# Patient Record
Sex: FEMALE | Race: WHITE | Employment: OTHER | URBAN - METROPOLITAN AREA
[De-identification: names, ages, dates, MRNs, and addresses within clinical notes are randomized per-mention and may not be internally consistent; named-entity substitution may affect disease eponyms.]

---

## 2017-04-04 ENCOUNTER — TELEPHONE (OUTPATIENT)
Dept: INTERNAL MEDICINE CLINIC | Age: 72
End: 2017-04-04

## 2017-04-04 ENCOUNTER — HOSPITAL ENCOUNTER (OUTPATIENT)
Dept: LAB | Age: 72
Discharge: HOME | End: 2017-04-04
Payer: MEDICARE

## 2017-04-04 ENCOUNTER — HOSPITAL ENCOUNTER (OUTPATIENT)
Dept: GENERAL RADIOLOGY | Age: 72
Discharge: HOME | End: 2017-04-04
Attending: INTERNAL MEDICINE
Payer: MEDICARE

## 2017-04-04 ENCOUNTER — OFFICE VISIT (OUTPATIENT)
Dept: INTERNAL MEDICINE CLINIC | Age: 72
End: 2017-04-04

## 2017-04-04 VITALS
RESPIRATION RATE: 16 BRPM | WEIGHT: 262.6 LBS | HEART RATE: 85 BPM | HEIGHT: 59 IN | SYSTOLIC BLOOD PRESSURE: 125 MMHG | BODY MASS INDEX: 52.94 KG/M2 | TEMPERATURE: 98.4 F | DIASTOLIC BLOOD PRESSURE: 68 MMHG | OXYGEN SATURATION: 97 %

## 2017-04-04 DIAGNOSIS — I10 ESSENTIAL HYPERTENSION: Chronic | ICD-10-CM

## 2017-04-04 DIAGNOSIS — J20.9 ACUTE BRONCHITIS, UNSPECIFIED ORGANISM: ICD-10-CM

## 2017-04-04 DIAGNOSIS — E78.5 HYPERLIPIDEMIA LDL GOAL <130: Chronic | ICD-10-CM

## 2017-04-04 DIAGNOSIS — R06.02 SOB (SHORTNESS OF BREATH): ICD-10-CM

## 2017-04-04 DIAGNOSIS — E55.9 VITAMIN D DEFICIENCY: ICD-10-CM

## 2017-04-04 DIAGNOSIS — J20.9 ACUTE BRONCHITIS, UNSPECIFIED ORGANISM: Primary | ICD-10-CM

## 2017-04-04 PROCEDURE — 83036 HEMOGLOBIN GLYCOSYLATED A1C: CPT

## 2017-04-04 PROCEDURE — 84443 ASSAY THYROID STIM HORMONE: CPT

## 2017-04-04 PROCEDURE — 85027 COMPLETE CBC AUTOMATED: CPT

## 2017-04-04 PROCEDURE — 80053 COMPREHEN METABOLIC PANEL: CPT

## 2017-04-04 PROCEDURE — 36415 COLL VENOUS BLD VENIPUNCTURE: CPT

## 2017-04-04 PROCEDURE — 80061 LIPID PANEL: CPT

## 2017-04-04 PROCEDURE — 71020 XR CHEST PA LAT: CPT

## 2017-04-04 RX ORDER — CODEINE PHOSPHATE AND GUAIFENESIN 10; 100 MG/5ML; MG/5ML
5 SOLUTION ORAL
Qty: 120 ML | Refills: 0 | Status: SHIPPED | OUTPATIENT
Start: 2017-04-04 | End: 2018-04-10 | Stop reason: ALTCHOICE

## 2017-04-04 RX ORDER — PREDNISONE 20 MG/1
TABLET ORAL
Qty: 18 TAB | Refills: 0 | Status: SHIPPED | OUTPATIENT
Start: 2017-04-04 | End: 2018-04-10 | Stop reason: ALTCHOICE

## 2017-04-04 RX ORDER — ALBUTEROL SULFATE 90 UG/1
1 AEROSOL, METERED RESPIRATORY (INHALATION)
Qty: 1 INHALER | Refills: 0 | Status: SHIPPED | OUTPATIENT
Start: 2017-04-04 | End: 2018-04-10 | Stop reason: ALTCHOICE

## 2017-04-04 RX ORDER — AZITHROMYCIN 250 MG/1
250 TABLET, FILM COATED ORAL SEE ADMIN INSTRUCTIONS
Qty: 6 TAB | Refills: 0 | Status: SHIPPED | OUTPATIENT
Start: 2017-04-04 | End: 2017-04-09

## 2017-04-04 RX ORDER — BUDESONIDE AND FORMOTEROL FUMARATE DIHYDRATE 80; 4.5 UG/1; UG/1
2 AEROSOL RESPIRATORY (INHALATION) 2 TIMES DAILY
Qty: 1 INHALER | Refills: 1 | Status: SHIPPED | OUTPATIENT
Start: 2017-04-04 | End: 2018-05-04

## 2017-04-04 NOTE — PROGRESS NOTES
HISTORY OF PRESENT ILLNESS  Jamal Chapman is a 80 y.o. female. HPI     She complains of a productive cough that started 4 days ago. Reports she also has had wheezing, chest tightness, shortness of breath with the cough, and mild aching. She also has chest pain with cough and pain in her back. Pt did not have fever in the office today. Reports she has been sleeping sitting up. Pt has been taking DayQuil with mild relief. She was diagnosed with asthma and has had to use inhaler in the past. She states her shortness of breath with coughing is new with current illness. Hypertension ROS: not taking medications regularly as instructed, no TIA's, no chest pain on exertion, no dyspnea on exertion, no swelling of ankles. New concerns: Stable- bp was 125/68 in the office today. She is not taking medication for her bp. Her weight was 260 in 04/2016 and she gained 20 pounds and is now back down to 262 pounds. She is trying to walk more. Hyperlipidemia:  Cardiovascular risk analysis - 80 y.o. female LDL goal is under 130. ROS: not taking medications as instructed, no TIA's, no chest pain on exertion, no dyspnea on exertion, no swelling of ankles. New concerns: Pt is not taking cholesterol medication. Reports she has shortness of breath with walking longer distances and this is not new and has been in the same pattern. Pt is trying to use her cane less at home and continues to use it when she is out. She is not taking a medication for her mood. Review of Systems   Respiratory: Positive for cough, sputum production, shortness of breath and wheezing. Positive for chest tightness   Cardiovascular: Positive for chest pain. Musculoskeletal: Positive for back pain. All other systems reviewed and are negative. Physical Exam   Constitutional: She is oriented to person, place, and time. She appears well-developed and well-nourished. HENT:   Head: Normocephalic and atraumatic.    Right Ear: External ear normal.   Left Ear: External ear normal.   Nose: Nose normal.   Mouth/Throat: Oropharynx is clear and moist.   Bilateral ear fullness   Eyes: Conjunctivae and EOM are normal. Pupils are equal, round, and reactive to light. Neck: Normal range of motion. Neck supple. Carotid bruit is not present. No thyroid mass and no thyromegaly present. Cardiovascular: Normal rate, regular rhythm, S1 normal, S2 normal, normal heart sounds and intact distal pulses. Pulmonary/Chest: Effort normal. She has wheezes (throughout ). Abdominal: Soft. Normal appearance and bowel sounds are normal. There is no hepatosplenomegaly. There is no tenderness. Musculoskeletal: Normal range of motion. Neurological: She is alert and oriented to person, place, and time. She has normal strength. No cranial nerve deficit or sensory deficit. Coordination normal.   Skin: Skin is warm, dry and intact. No abrasion and no rash noted. Psychiatric: She has a normal mood and affect. Her behavior is normal. Judgment and thought content normal.   Nursing note and vitals reviewed. ASSESSMENT and PLAN  Teodora Silverio was seen today for cough. Diagnoses and all orders for this visit:    Acute bronchitis, unspecified organism  Will follow up with pt with chest x-ray results. Pt to begin taking Zpak, Prednisone taper, and Robitussin AC. Warned pt that steroids will cause weight gain. Pt will also begin using Symbicort and I warned pt to rinse her mouth out after using this to avoid thrush. Pt should use Symbicort until she starts feeling better. She should use Albuterol inhaler prn. She should follow up with me next week on 4/10/17 so I can listen to her lungs. -     XR CHEST PA LAT; Future    Essential hypertension  BP is at goal and pt not taking medication for her bp- will monitor this.    -     CBC W/O DIFF  -     URINALYSIS W/ RFLX MICROSCOPIC    Hyperlipidemia LDL goal <130  Pt not taking cholesterol medication- will check labs today.  -     METABOLIC PANEL, COMPREHENSIVE  -     LIPID PANEL  -     TSH 3RD GENERATION    SOB (shortness of breath)  She has had shortness of breath with walking longer distances although not new and in same pattern and we discussed sending her to a cardiologist in the future. -     XR CHEST PA LAT; Future    Vitamin D deficiency  -     VITAMIN D, 25 HYDROXY    Other orders  -     guaiFENesin-codeine (ROBITUSSIN AC) 100-10 mg/5 mL solution; Take 5 mL by mouth three (3) times daily as needed for Cough. Max Daily Amount: 15 mL. -     budesonide-formoterol (SYMBICORT) 80-4.5 mcg/actuation HFAA inhaler; Take 2 Puffs by inhalation two (2) times a day. -     albuterol (PROAIR HFA) 90 mcg/actuation inhaler; Take 1 Puff by inhalation every four (4) hours as needed for Wheezing or Shortness of Breath. -     predniSONE (DELTASONE) 20 mg tablet; 3 every day for 3 days, 2 every day for 3 days, 1/2 every day for 3 days  -     azithromycin (ZITHROMAX) 250 mg tablet; Take 1 Tab by mouth See Admin Instructions for 5 days. lab results and schedule of future lab studies reviewed with patient  reviewed diet, exercise and weight control  reviewed medications and side effects in detail     Written by Mini Anders, as dictated by Neha Lundy MD.     Current diagnosis and concerns discussed with pt at length. Understands risks and benefits or current treatment plan and medications and accepts the treatment and medication with any possible risks. Pt asks appropriate questions which were answered. Pt instructed to call with any concerns or problems.

## 2017-04-04 NOTE — TELEPHONE ENCOUNTER
[facility] has a questions about the predinisone.   Please call [facility] [phone]

## 2017-04-04 NOTE — PROGRESS NOTES
Please call cxr normal- cont medication management like discussed in visit and see her back soon for follow

## 2017-04-05 LAB
ALBUMIN SERPL-MCNC: 4 G/DL (ref 3.5–4.8)
ALBUMIN/GLOB SERPL: 1.4 {RATIO} (ref 1.2–2.2)
ALP SERPL-CCNC: 80 IU/L (ref 39–117)
ALT SERPL-CCNC: 32 IU/L (ref 0–32)
AST SERPL-CCNC: 25 IU/L (ref 0–40)
BILIRUB SERPL-MCNC: 0.5 MG/DL (ref 0–1.2)
BUN SERPL-MCNC: 17 MG/DL (ref 8–27)
BUN/CREAT SERPL: 28 (ref 12–28)
CALCIUM SERPL-MCNC: 9 MG/DL (ref 8.7–10.3)
CHLORIDE SERPL-SCNC: 100 MMOL/L (ref 96–106)
CHOLEST SERPL-MCNC: 237 MG/DL (ref 100–199)
CO2 SERPL-SCNC: 29 MMOL/L (ref 18–29)
CREAT SERPL-MCNC: 0.6 MG/DL (ref 0.57–1)
ERYTHROCYTE [DISTWIDTH] IN BLOOD BY AUTOMATED COUNT: 14.3 % (ref 12.3–15.4)
GLOBULIN SER CALC-MCNC: 2.8 G/DL (ref 1.5–4.5)
GLUCOSE SERPL-MCNC: 110 MG/DL (ref 65–99)
HCT VFR BLD AUTO: 43.1 % (ref 34–46.6)
HDLC SERPL-MCNC: 49 MG/DL
HGB BLD-MCNC: 13.9 G/DL (ref 11.1–15.9)
INTERPRETATION, 910389: NORMAL
LDLC SERPL CALC-MCNC: 167 MG/DL (ref 0–99)
MCH RBC QN AUTO: 30 PG (ref 26.6–33)
MCHC RBC AUTO-ENTMCNC: 32.3 G/DL (ref 31.5–35.7)
MCV RBC AUTO: 93 FL (ref 79–97)
PLATELET # BLD AUTO: 264 X10E3/UL (ref 150–379)
POTASSIUM SERPL-SCNC: 4.7 MMOL/L (ref 3.5–5.2)
PROT SERPL-MCNC: 6.8 G/DL (ref 6–8.5)
RBC # BLD AUTO: 4.63 X10E6/UL (ref 3.77–5.28)
SODIUM SERPL-SCNC: 144 MMOL/L (ref 134–144)
TRIGL SERPL-MCNC: 105 MG/DL (ref 0–149)
TSH SERPL DL<=0.005 MIU/L-ACNC: 4.29 UIU/ML (ref 0.45–4.5)
VLDLC SERPL CALC-MCNC: 21 MG/DL (ref 5–40)
WBC # BLD AUTO: 5.8 X10E3/UL (ref 3.4–10.8)

## 2017-04-06 ENCOUNTER — TELEPHONE (OUTPATIENT)
Dept: INTERNAL MEDICINE CLINIC | Age: 72
End: 2017-04-06

## 2017-04-06 NOTE — TELEPHONE ENCOUNTER
Contacted pt and advised of cxr results and follow up in office. Pt has appt on Monday for follow up.

## 2017-04-06 NOTE — TELEPHONE ENCOUNTER
----- Message from Suri Wylie MD sent at 4/4/2017  3:49 PM EDT -----  Please call cxr normal- cont medication management like discussed in visit and see her back soon for follow

## 2017-04-08 LAB
HBA1C MFR BLD: 5.8 % (ref 4.8–5.6)
SPECIMEN STATUS REPORT, ROLRST: NORMAL

## 2017-04-10 ENCOUNTER — OFFICE VISIT (OUTPATIENT)
Dept: INTERNAL MEDICINE CLINIC | Age: 72
End: 2017-04-10

## 2017-04-10 VITALS
RESPIRATION RATE: 16 BRPM | DIASTOLIC BLOOD PRESSURE: 64 MMHG | OXYGEN SATURATION: 97 % | SYSTOLIC BLOOD PRESSURE: 144 MMHG | HEART RATE: 81 BPM | HEIGHT: 59 IN | TEMPERATURE: 97.9 F

## 2017-04-10 DIAGNOSIS — Z13.31 SCREENING FOR DEPRESSION: ICD-10-CM

## 2017-04-10 DIAGNOSIS — E78.5 HYPERLIPIDEMIA LDL GOAL <130: Chronic | ICD-10-CM

## 2017-04-10 DIAGNOSIS — N95.9 POST MENOPAUSAL PROBLEMS: ICD-10-CM

## 2017-04-10 DIAGNOSIS — Z12.31 VISIT FOR SCREENING MAMMOGRAM: ICD-10-CM

## 2017-04-10 DIAGNOSIS — Z00.00 MEDICARE ANNUAL WELLNESS VISIT, SUBSEQUENT: ICD-10-CM

## 2017-04-10 DIAGNOSIS — K63.9 BOWEL TROUBLE: ICD-10-CM

## 2017-04-10 DIAGNOSIS — Z00.00 ROUTINE GENERAL MEDICAL EXAMINATION AT A HEALTH CARE FACILITY: ICD-10-CM

## 2017-04-10 DIAGNOSIS — Z23 ENCOUNTER FOR IMMUNIZATION: ICD-10-CM

## 2017-04-10 DIAGNOSIS — Z12.11 SCREENING FOR COLON CANCER: ICD-10-CM

## 2017-04-10 DIAGNOSIS — Z00.00 MEDICARE ANNUAL WELLNESS VISIT, INITIAL: Primary | ICD-10-CM

## 2017-04-10 DIAGNOSIS — Z13.39 SCREENING FOR ALCOHOLISM: ICD-10-CM

## 2017-04-10 DIAGNOSIS — I10 ESSENTIAL HYPERTENSION: Chronic | ICD-10-CM

## 2017-04-10 DIAGNOSIS — Z71.89 ADVANCED CARE PLANNING/COUNSELING DISCUSSION: ICD-10-CM

## 2017-04-10 RX ORDER — VALACYCLOVIR HYDROCHLORIDE 1 G/1
1000 TABLET, FILM COATED ORAL 3 TIMES DAILY
Qty: 21 TAB | Refills: 0 | Status: SHIPPED | OUTPATIENT
Start: 2017-04-10 | End: 2018-04-10 | Stop reason: ALTCHOICE

## 2017-04-10 RX ORDER — OLMESARTAN MEDOXOMIL 20 MG/1
20 TABLET ORAL DAILY
Qty: 30 TAB | Refills: 5 | Status: SHIPPED | OUTPATIENT
Start: 2017-04-10 | End: 2018-04-10 | Stop reason: SDUPTHER

## 2017-04-10 RX ORDER — ATORVASTATIN CALCIUM 10 MG/1
10 TABLET, FILM COATED ORAL DAILY
Qty: 30 TAB | Refills: 5 | Status: SHIPPED | OUTPATIENT
Start: 2017-04-10 | End: 2018-04-10

## 2017-04-10 NOTE — ACP (ADVANCE CARE PLANNING)
NN discussed with patient about an Advanced Medical Directive. Provided patient blank Advanced Medical Directive and 'Your Right to Decide' Booklet. NN reviewed Advanced Medical Directive paperwork with patient with a brief description of how to complete the form. Requested that if document completed, to please provide a copy of completed Advanced Medical Directive to PCP office. Patient verbalized understanding.

## 2017-04-10 NOTE — PROGRESS NOTES
HISTORY OF PRESENT ILLNESS  Ariadne Abrams is a 80 y.o. female. HPI     At her 4/4/17 office visit she complained of a productive cough, wheezing, chest tightness, shortness of breath with the cough, and mild aching. Pt also had chest pain with the cough and pain in her back. I ordered a chest x-ray and prescribed pt Zpak, Prednisone taper, and Robitussin AC. I also started pt on Symbicort. Chest x-ray was normal. Today pt states cough is still present and productive. She finished Zpak and is finishing Prednisone taper. Pt is able to sleep laying down more. She has noticed difficulty sleeping with Prednisone and loose stools with antibiotics. Her mobility and steps have improved and she is feeling better. Hypertension ROS: no TIA's, no chest pain on exertion, no dyspnea on exertion, no swelling of ankles. New concerns: Bp was 144/64 in the office today. Hyperlipidemia:  Cardiovascular risk analysis - 80 y.o. female LDL goal is under 130. ROS: no TIA's, no chest pain on exertion, no dyspnea on exertion, no swelling of ankles. New concerns: Pt is not taking cholesterol medication although cholesterol was elevated in 4/4/17 labs. Pt has never had a bone density scan or pneumonia vaccine. She is overdue for mammogram.     Her A1C was 5.8% in 4/4/17 labs. Reports in the past two months she has been monitoring her carbohydrate intake. Pt does not drink a lot of water. She states that when she has lost weight in the past it is when she eats more and not a large meal.     Her last colonoscopy was 5-10 years ago and reports polyps were found. Reports her BM's fluctuate between loose and normal. Reports since she had intestines resected (due to diverticulitis) she has had bowel urgency. Pt states that the mornings are worse for her in regards to bowel frequency. Reports when she ate a diet with less processed foods and more fruit and vegetables her BM's were better.     Reports she has swelling in bilateral ankles, especially if she is on her feet a lot. Review of Systems   Respiratory: Positive for cough and sputum production. All other systems reviewed and are negative. Physical Exam   Constitutional: She is oriented to person, place, and time. She appears well-developed and well-nourished. HENT:   Head: Normocephalic and atraumatic. Right Ear: External ear normal.   Left Ear: External ear normal.   Nose: Nose normal.   Mouth/Throat: Oropharynx is clear and moist.   Eyes: Conjunctivae and EOM are normal.   Neck: Normal range of motion. Neck supple. Carotid bruit is not present. No thyroid mass and no thyromegaly present. Cardiovascular: Normal rate, regular rhythm, S1 normal, S2 normal, normal heart sounds and intact distal pulses. Pulmonary/Chest: Effort normal. She has wheezes (expiratory). Abdominal: Soft. Normal appearance and bowel sounds are normal. There is no hepatosplenomegaly. There is no tenderness. Musculoskeletal: Normal range of motion. Neurological: She is alert and oriented to person, place, and time. She has normal strength. No cranial nerve deficit or sensory deficit. Coordination normal.   Skin: Skin is warm, dry and intact. No abrasion and no rash noted. Psychiatric: She has a normal mood and affect. Her behavior is normal. Judgment and thought content normal.   Nursing note and vitals reviewed. ASSESSMENT and PLAN  Yoana Anthony was seen today for follow-up. Diagnoses and all orders for this visit:    Medicare annual wellness visit, subsequent  Suggested pt see [facility] downstairs for eye exam.  -     REFERRAL TO OPHTHALMOLOGY    Essential hypertension  Her bp is higher today than it was on 4/4/17 and she has not been taking bp medication. This could be due to all of the medication she is taking. Pt was sick on 4/4/17 and this could be why her bp was so low. I restarted her Benicar 20 mg.      Hyperlipidemia LDL goal <130  Lipids were elevated in recent labs and she has been off of medication. Pt to begin taking Lipitor 10 mg. Post menopausal problems  -     DEXA BONE DENSITY STUDY AXIAL; Future    Screening for colon cancer  -     REFERRAL TO GASTROENTEROLOGY    Visit for screening mammogram  -     MART[facility] MAMMO BI SCREENING INCL CAD; Future    Encounter for immunization  Pt received pneumonia vaccine today in the office.   -     Pneumococcal polysaccharide vaccine, 23-valent, adult or immunosuppressed pt dose    Bowel trouble  Provided pt with Dr. Tomás Holley contact information and encouraged her to make appointment to discuss her loose BM's and history of bowel resection due to diverticulitis prior to making appt for colonoscopy. Other orders  -     olmesartan (BENICAR) 20 mg tablet; Take 1 Tab by mouth daily. -     atorvastatin (LIPITOR) 10 mg tablet; Take 1 Tab by mouth daily. Advised pt that her A1C was in the prediabetic range and I will continue to monitor this. I told pt that she needs to continue to monitor her carbohydrate intake and work on eating small frequent meals. She should follow up with me in 4 months. Symptoms are improving although still has residual cough. Advised pt that cough may last for up to 4-6 weeks. Her oxygenation, heart rate, and bp are stable. Pt should finish Prednisone taper and continue using Symbicort inhaler for a few more weeks. She should make sure her mobility is continuing to improve and her steps are increasing as this is a sign she is doing better. I suggested pt elevate her ankles and also use compression stockings. This should improve with weight loss and decrease in sodium intake. lab results and schedule of future lab studies reviewed with patient  reviewed diet, exercise and weight control  reviewed medications and side effects in detail     Written by Mini Anders, as dictated by Neha Lundy MD.     Current diagnosis and concerns discussed with pt at length.  Understands risks and benefits or current treatment plan and medications and accepts the treatment and medication with any possible risks. Pt asks appropriate questions which were answered. Pt instructed to call with any concerns or problems.

## 2017-04-10 NOTE — PATIENT INSTRUCTIONS
Medicare Part B Preventive Services Guidelines/Limitations Date last completed and Frequency Due Date   Bone Mass Measurement  (age 72 & older, biennial) Requires diagnosis related to osteoporosis or estrogen deficiency. Biennial benefit unless patient has history of long-term glucocorticoid tx or baseline is needed because initial test was by other method Never completed    Recommended every 2 years As recommended by your PCP or Specialist     Cardiovascular Screening Blood Tests (every 5 years)  Total cholesterol, HDL, Triglycerides Order as a panel if possible Completed 4/2017    As recommended by your PCP As recommended by your PCP or Specialist   Colorectal Cancer Screening  -Fecal occult blood test (annual)  -Flexible sigmoidoscopy (5y)  -Screening colonoscopy (10y)  -Barium Enema Age 49-80; After age [de-identified] if history of abnormal results Completed between 5 to 10 years ago     Recommended every 5 to 10 years  Due now as reported by patient     Counseling to Prevent Tobacco Use (up to 8 sessions per year)  - Counseling greater than 3 and up to 10 minutes  - Counseling greater than 10 minutes Patients must be asymptomatic of tobacco-related conditions to receive as preventive service N/A N/A   Diabetes Screening Tests (at least every 3 years, Medicare covers annually or at 6-month intervals for prediabetic patients)    Fasting blood sugar (FBS) or glucose tolerance test (GTT) Patient must be diagnosed with one of the following:  -Hypertension, Dyslipidemia, obesity, previous impaired FBS or GTT  Or any two of the following: overweight, FH of diabetes, age ? 72, history of gestational diabetes, birth of baby weighing more than 9 pounds Completed 4/2017    Recommended every 3 years for non-diabetics    Recommended every 3-6 months for Pre-Diabetics and Diabetics As recommended by your PCP or Specialist     Glaucoma Screening (no USPSTF recommendation) Diabetes mellitus, family history, , age 48 or over,  American, age 72 or over Completed greater than 2 years ago    Recommended annually As recommended by your PCP or Specialist   Seasonal Influenza Vaccination (annually)  Recommended Annually Patient declined this year. TDAP Vaccination  Completed 10/2013    Recommended every 10 years As recommended by your PCP or Specialist   Zoster (Shingles) Vaccination Covered by Medicare Part D through the pharmacy- PCP provides prescription Never received    Recommended once over age 48  As recommended by your PCP or Specialist     Pneumococcal Vaccination (once after 72)  Pneumo 23- 4/2017  Recommended once over the age of 72    Prevnar 15-  Recommended once over the age of 72 Completed        Due April 2018   Screening Mammography (biennial age 54-69) Annually (age 36 or over) Completed several years ago   As recommended by your PCP or Specialist     Screening Pap Tests and Pelvic Examination (up to age 79 and after 79 if unknown history or abnormal study last 8 years) Every 25 months except high risk As recommended by your PCP or Specialist   As recommended by your PCP or Specialist     Ultrasound Screening for Abdominal Aortic Aneurysm (AAA) (once) Patient must be referred through [facility] and not have had a screening for abdominal aortic aneurysm before under Medicare. Limited to patients who meet one of the following criteria:  - Men who are 73-68 years old and have smoked more than 100 cigarettes in their lifetime.  -Anyone with a FH of AAA  -Anyone recommended for screening by USPSTF Not indicated unless recommended by PCP   Not indicated unless recommended by PCP     Family Practice Management 2011    Please bring a copy of your completed advance medical directive to the office so it may be added to your medical record. Thank you. If you have any questions or concerns please feel free to contact me at [phone]. It was a pleasure meeting you today and participating in your healthcare.   Chasidy Langford Celia Keller

## 2017-04-11 PROBLEM — Z71.89 ADVANCED CARE PLANNING/COUNSELING DISCUSSION: Status: ACTIVE | Noted: 2017-04-11

## 2017-04-11 NOTE — PROGRESS NOTES
Nurse Navigator Medicare Wellness Visit performed by LETY Frausto  This is an Initial Fredy Exam (AWV) (Performed 12 months after IPPE or effective date of Medicare Part B enrollment, Once in a lifetime)    I have reviewed the patient's medical history in detail and updated the computerized patient record. History     Past Medical History:   Diagnosis Date    Asthma     COPD (chronic obstructive pulmonary disease) ([address]     Diverticulitis     Hyperlipidemia     Hyperlipidemia LDL goal <130 3/17/2016    Hypertension     Psychiatric disorder     Depression      Past Surgical History:   Procedure Laterality Date    HX COLECTOMY      Partial    HX HEENT      HX MENISCECTOMY      left knee     Current Outpatient Prescriptions   Medication Sig Dispense Refill    olmesartan (BENICAR) 20 mg tablet Take 1 Tab by mouth daily. 30 Tab 5    atorvastatin (LIPITOR) 10 mg tablet Take 1 Tab by mouth daily. 30 Tab 5    valACYclovir (VALTREX) 1 gram tablet Take 1 Tab by mouth three (3) times daily. 21 Tab 0    guaiFENesin-codeine (ROBITUSSIN AC) 100-10 mg/5 mL solution Take 5 mL by mouth three (3) times daily as needed for Cough. Max Daily Amount: 15 mL. 120 mL 0    budesonide-formoterol (SYMBICORT) 80-4.5 mcg/actuation HFAA inhaler Take 2 Puffs by inhalation two (2) times a day. 1 Inhaler 1    albuterol (PROAIR HFA) 90 mcg/actuation inhaler Take 1 Puff by inhalation every four (4) hours as needed for Wheezing or Shortness of Breath. 1 Inhaler 0    predniSONE (DELTASONE) 20 mg tablet 3 every day for 3 days, 2 every day for 3 days, 1/2 every day for 3 days 18 Tab 0    indomethacin (INDOCIN) 25 mg/5 mL oral suspension Take  by mouth.  cephALEXin (KEFLEX) 500 mg capsule Take 500 mg by mouth four (4) times daily.  acetaminophen (TYLENOL) 500 mg tablet Take 2 Tabs by mouth every eight (8) hours.  60 Tab 2    oxyCODONE IR (ROXICODONE) 10 mg tab immediate release tablet 1-2 tabs every 3hrs as needed for pain. 100 Tab 0    docusate sodium (COLACE) 50 mg capsule Take two tabs twice daily for two weeks, then twice a day as needed thereafter. Hold for loose stools. 60 Cap 2    ondansetron (ZOFRAN ODT) 8 mg disintegrating tablet Take 1 Tab by mouth every eight (8) hours as needed for Nausea. 20 Tab 2    atorvastatin (LIPITOR) 20 mg tablet Take 1 Tab by mouth daily for 90 days. 90 Tab 0    furosemide (LASIX) 20 mg tablet Take 1 Tab by mouth daily for 90 days. 90 Tab 0    FLUoxetine (PROZAC) 20 mg capsule Take 1 Cap by mouth daily for 90 days. 90 Cap 0    esomeprazole (NEXIUM) 40 mg capsule Take 1 Cap by mouth daily for 90 days. 90 Cap 1     Allergies   Allergen Reactions    Ciprofloxacin Swelling    Penicillins Swelling     Unsure of reaction    Sulfa (Sulfonamide Antibiotics) Unknown (comments)     She doesn't remember       History reviewed. No pertinent family history. Social History   Substance Use Topics    Smoking status: Never Smoker    Smokeless tobacco: Not on file    Alcohol use Yes      Comment: glass of wine each day     Patient Active Problem List   Diagnosis Code    Fracture T14.8    HTN (hypertension) I10    Hyperlipidemia LDL goal <130 E78.5    Dysthymia F34.1    Advanced care planning/counseling discussion Z71.89         Depression Risk Factor Screening:   Patient denies feelings of being down, depressed or hopeless at this time. Patient states that they have a strong support system within their family & friends. Patient shares that she is  from her , whom she was  to for about 40 years. Patient adds that she was unable to afford her home in [place], so she moved to [place] to be close to her son & his family. Patient denies thoughts of harm to self or others. Patient states that she is hopeful the divorce from her  will be finalized this year.   PHQ 2 / 9, over the last two weeks 4/10/2017   Little interest or pleasure in doing things Not at all   Feeling down, depressed or hopeless Not at all   Total Score PHQ 2 0     Alcohol Risk Factor Screening: On any occasion during the past 3 months, have you had more than 3 drinks containing alcohol? No    Do you average more than 7 drinks per week? No    Functional Ability and Level of Safety:     Hearing Loss   normal-to-mild    Activities of Daily Living   Self-care. Patient states that she is renting an apartment & she lives alone. Patient adds that her son does live in the area & she speaks with him & visits him regularly. Patient states independence in all ADLs & denies the use of assistive devices for ambulation. NN encouraged patient to continue and/ or introduce routine physical exercise into their daily routine as applicable & as recommended by PCP. Patient verbalized understanding & agreement to take this into consideration. Patient shares that her weight has been a constant struggle; she recently purchased a FitBit to monitor her steps & she tries to walk 8,000 steps every day. Patient shares that she has been on prednisone & she thinks that has caused her some recent weight gain. Patient tries to monitor her caloric intake, but carbohydrates are her weakness. NN discussed the importance of drinking water daily. Patient verbalized understanding & admits that she does not drink much water, but she will work on increasing her hydration. Patient states that she had a bad fall about a year ago, so she is very cautious with her actions; however, she does enjoy walking her dog.    Requires assistance with:   ADL Assessment 4/10/2017   Feeding yourself No Help Needed   Getting from bed to chair No Help Needed   Getting dressed No Help Needed   Bathing or showering No Help Needed   Walk across the room (includes cane/walker) No Help Needed   Using the telphone No Help Needed   Taking your medications No Help Needed   Preparing meals No Help Needed   Managing money (expenses/bills) No Help Needed   Moderately strenuous housework (laundry) No Help Needed   Shopping for personal items (toiletries/medicines) No Help Needed   Shopping for groceries No Help Needed   Driving No Help Needed   Climbing a flight of stairs No Help Needed   Getting to places beyond walking distances No Help Needed          Fall Risk   Patient denies falls within the past year & verbalizes awareness of fall prevention strategies. Fall Risk Assessment, last 12 mths 4/10/2017   Able to walk? Yes   Fall in past 12 months? No   Fall with injury? -   Number of falls in past 12 months -   Fall Risk Score -     Abuse Screen   Patient is not abused    Review of Systems   Medicare Wellness Visit    Physical Examination     No exam data present    Evaluation of Cognitive Function:  Mood/affect:  happy  Appearance: age appropriate and casually dressed  Family member/caregiver input: None present; however, patient reports a strong support system. No exam performed today, Medicare Wellness Visit. Patient Care Team:  Luisa Young MD as PCP - General (Internal Medicine)    Advice/Referrals/Counseling   Education and counseling provided:  End-of-Life planning (with patient's consent)  Pneumococcal Vaccine  Influenza Vaccine  Screening Mammography  Screening Pap and pelvic (covered once every 2 years)  Colorectal cancer screening tests  Bone mass measurement (DEXA)  Screening for glaucoma  tdap & shingles vaccinations      Assessment/Plan   1. NN discussed with patient about an Advanced Medical Directive. Provided patient blank Advanced Medical Directive and 'Your Right to Decide' Booklet. NN reviewed Advanced Medical Directive paperwork with patient with a brief description of how to complete the form. Requested that if document completed, to please provide a copy of completed Advanced Medical Directive to PCP office. Patient verbalized understanding.      2. Patient is up to date on the following immunizations: tdap vaccine (admin 10/2013). Patient confirmed the aforementioned preventative immunization dates are correct. Patient denies receiving a 2016 flu vaccine. Patient denies receiving a shingles vaccine in the past & patient denies ever having a case of shingles in the past. Patient denies receiving pneumonia vaccines. Patient verbalized agreement for a pneumonia 23 vaccine today. PCP notified & order written. Nurse administered pneumonia 23 vaccine to patient. VIS to be provided by treatment team. Patient's health maintenance immunization record has been updated & is current. 3. Patient admits that she has never had a screening dexa scan; she states that it has been 5-10 years since her last screening colonoscopy completed in [place] & colon polyps were removed. Patient states that it has been several years since her last screening mammogram & a routine eye exam/ glaucoma screening. PCP notified. 4. Today, PCP provided patient with the following preventative service items: an order for a screening dexa scan, an order for a screening mammogram, a referral to Dr. Roger Sims for evaluation of a screening colonoscopy & a referral to [facility] John Costello at the [facility] for a routine eye exam & glaucoma screening. Patient verbalized agreement to complete preventative measures. Patient verbalized understanding of all information discussed. Patient was given the opportunity to ask questions. Medication reconciliation completed by MA/ LPN and reviewed by PCP. Patient provided AVS which includes Medicare Wellness Preventative Screening Table.

## 2018-04-10 ENCOUNTER — OFFICE VISIT (OUTPATIENT)
Dept: INTERNAL MEDICINE CLINIC | Age: 73
End: 2018-04-10

## 2018-04-10 VITALS
DIASTOLIC BLOOD PRESSURE: 77 MMHG | HEIGHT: 59 IN | HEART RATE: 79 BPM | RESPIRATION RATE: 14 BRPM | OXYGEN SATURATION: 97 % | TEMPERATURE: 97.4 F | WEIGHT: 291.2 LBS | BODY MASS INDEX: 58.71 KG/M2 | SYSTOLIC BLOOD PRESSURE: 156 MMHG

## 2018-04-10 DIAGNOSIS — Z00.00 MEDICARE ANNUAL WELLNESS VISIT, SUBSEQUENT: Primary | ICD-10-CM

## 2018-04-10 DIAGNOSIS — Z78.0 POST-MENOPAUSAL: ICD-10-CM

## 2018-04-10 DIAGNOSIS — Z12.31 VISIT FOR SCREENING MAMMOGRAM: ICD-10-CM

## 2018-04-10 DIAGNOSIS — R73.01 IMPAIRED FASTING GLUCOSE: ICD-10-CM

## 2018-04-10 DIAGNOSIS — E78.5 HYPERLIPIDEMIA LDL GOAL <130: Chronic | ICD-10-CM

## 2018-04-10 DIAGNOSIS — N93.9 VAGINAL SPOTTING: ICD-10-CM

## 2018-04-10 DIAGNOSIS — I10 ESSENTIAL HYPERTENSION: Chronic | ICD-10-CM

## 2018-04-10 DIAGNOSIS — E66.01 OBESITY, MORBID (HCC): ICD-10-CM

## 2018-04-10 DIAGNOSIS — F34.1 DYSTHYMIA: ICD-10-CM

## 2018-04-10 DIAGNOSIS — Z12.11 SCREENING FOR COLON CANCER: ICD-10-CM

## 2018-04-10 RX ORDER — ATORVASTATIN CALCIUM 20 MG/1
20 TABLET, FILM COATED ORAL DAILY
Qty: 90 TAB | Refills: 0 | Status: SHIPPED | OUTPATIENT
Start: 2018-04-10 | End: 2018-07-07 | Stop reason: SDUPTHER

## 2018-04-10 RX ORDER — FUROSEMIDE 20 MG/1
20 TABLET ORAL DAILY
Qty: 90 TAB | Refills: 0 | Status: SHIPPED | OUTPATIENT
Start: 2018-04-10 | End: 2018-07-07 | Stop reason: SDUPTHER

## 2018-04-10 RX ORDER — FLUOXETINE HYDROCHLORIDE 20 MG/1
20 CAPSULE ORAL DAILY
Qty: 90 CAP | Refills: 0 | Status: SHIPPED | OUTPATIENT
Start: 2018-04-10 | End: 2018-07-07 | Stop reason: SDUPTHER

## 2018-04-10 RX ORDER — OLMESARTAN MEDOXOMIL 20 MG/1
20 TABLET ORAL DAILY
Qty: 90 TAB | Refills: 0 | Status: SHIPPED | OUTPATIENT
Start: 2018-04-10 | End: 2018-05-18 | Stop reason: SDUPTHER

## 2018-04-10 NOTE — PROGRESS NOTES
HISTORY OF PRESENT ILLNESS  Ron Duke is a 71 y.o. female. HPI   Dysthymia: Patient reports she is under an immense amount of stress. She has not taken any medications for the past 9 months. She state she is ready to resume medications. She inquires about starting medications for anxiety. She was on Prozac in the past. Patient has been trying to exercise with walking. She notes as the weather gets better she will exercise more. GERD: Patient occasionally takes TUMS to manage reflux. She has not been taking Nexium. SOB: Patient has Symbicort, but has not been using it. She has noticed decline in her breathing. Patient has noticed she develops SOB easily. Hypertension ROS: not taking medications, no TIA's, no chest pain on exertion, no dyspnea on exertion, no swelling of ankles. New concerns:  Patient's BP in office today is 156/77. She has not been taking Benicar. Patient has not been taking lasix. She notes fluid retention has gotten horrible. Hyperlipidemia:  Cardiovascular risk analysis - 71 y.o. female LDL goal is under 130. ROS: not taking medications regularly as instructed, no TIA's, no chest pain on exertion, no dyspnea on exertion, no swelling of ankles. Tolerating meds, no myalgias or other side effects noted  New concerns: Patient has not been taking atorvastatin. Patient reports over the past few weeks she has noticed vaginal spotting. Patient states for years she has had intermittent diarrhea in the mornings. She denies following up for colonoscopy. Patient received flu vaccine this season. Denies following up for pneumonia vaccines or mammogram.   Review of Systems   All other systems reviewed and are negative. Physical Exam   Constitutional: She is oriented to person, place, and time. She appears well-developed and well-nourished. HENT:   Head: Normocephalic and atraumatic.    Right Ear: External ear normal.   Left Ear: External ear normal.   Nose: Nose normal. Mouth/Throat: Oropharynx is clear and moist.   Eyes: Conjunctivae and EOM are normal.   Neck: Normal range of motion. Neck supple. Carotid bruit is not present. No thyroid mass and no thyromegaly present. Cardiovascular: Normal rate, regular rhythm, S1 normal, S2 normal, normal heart sounds and intact distal pulses. Pulmonary/Chest: Effort normal and breath sounds normal.   Abdominal: Soft. Normal appearance and bowel sounds are normal. There is no hepatosplenomegaly. There is no tenderness. Musculoskeletal: Normal range of motion. Neurological: She is alert and oriented to person, place, and time. She has normal strength. No cranial nerve deficit or sensory deficit. Coordination normal.   Skin: Skin is warm, dry and intact. No abrasion and no rash noted. Psychiatric: She has a normal mood and affect. Her behavior is normal. Judgment and thought content normal.   Nursing note and vitals reviewed. ASSESSMENT and PLAN  Diagnoses and all orders for this visit:    1. Medicare annual wellness visit, subsequent  -     CBC W/O DIFF    2. Essential hypertension  /77 today in office. Patient has not been on medications for past 9 months. Instructed patient to resume Benicar and Lasix. -     olmesartan (BENICAR) 20 mg tablet; Take 1 Tab by mouth daily. -     furosemide (LASIX) 20 mg tablet; Take 1 Tab by mouth daily for 90 days.  -     METABOLIC PANEL, COMPREHENSIVE  -     URINALYSIS W/ RFLX MICROSCOPIC    3. Hyperlipidemia LDL goal <130  Suspect labs will show poor lipid values. Patient has not been on medications for past 9 months. Instructed patient to resume atorvastatin. -     atorvastatin (LIPITOR) 20 mg tablet; Take 1 Tab by mouth daily for 90 days.  -     LIPID PANEL    4. Dysthymia  Patient under a lot of stress and her anxiety is exacerbated. She will resume taking Prozac 20 mg.   -     FLUoxetine (PROZAC) 20 mg capsule; Take 1 Cap by mouth daily for 90 days.     5. Obesity, morbid [facility])  Encouraged patient to resume exercising and watching diet more closely to facilitate weight loss. 6. Vaginal spotting  Patient will follow up with GYN to discuss. Gaviota Stephenson OB/GYN ref [facility]    7. Impaired fasting glucose  Will monitor A1C. Concerned lack of medications and lack of healthy diet will have caused spike in A1C.   -     HEMOGLOBIN A1C WITH EAG    8. Screening for colon cancer  Patient will follow up with GI for colonoscopy. -     REFERRAL TO GASTROENTEROLOGY    9. Visit for screening mammogram  Patient due for imaging and will follow up. -     MARTÍN MAMMO BI SCREENING INCL CAD; Future    10. Post-menopausal  Patient due for imaging and will follow up. -     DEXA BONE DENSITY STUDY AXIAL; Future    She will follow up in a month.     lab results and schedule of future lab studies reviewed with patient  reviewed diet, exercise and weight control    Written by Ray Pina, as dictated by Sintia Levy MD.     Current diagnosis and concerns discussed with pt at length. Understands risks and benefits or current treatment plan and medications and accepts the treatment and medication with any possible risks. Pt asks appropriate questions which were answered. Pt instructed to call with any concerns or problems.

## 2018-04-10 NOTE — ACP (ADVANCE CARE PLANNING)
Directive and 'Your Right to Decide' Booklet. I reviewed Advanced Medical Directive paperwork with patient with a brief description of how to complete the form. Requested that if document completed, to please provide a copy of completed Advanced Medical Directive to PCP office. Patient verbalized understanding.

## 2018-04-10 NOTE — PATIENT INSTRUCTIONS

## 2018-04-10 NOTE — PROGRESS NOTES
This is the Subsequent Medicare Annual Wellness Exam, performed 12 months or more after the Initial AWV or the last Subsequent AWV    I have reviewed the patient's medical history in detail and updated the computerized patient record. History     Past Medical History:   Diagnosis Date    Asthma     COPD (chronic obstructive pulmonary disease) [address] [address]     Diverticulitis     Hyperlipidemia     Hyperlipidemia LDL goal <130 3/17/2016    Hypertension     Psychiatric disorder     Depression      Past Surgical History:   Procedure Laterality Date    HX COLECTOMY      Partial    HX HEENT      HX MENISCECTOMY      left knee     Current Outpatient Prescriptions   Medication Sig Dispense Refill    olmesartan (BENICAR) 20 mg tablet Take 1 Tab by mouth daily. 30 Tab 5    budesonide-formoterol (SYMBICORT) 80-4.5 mcg/actuation HFAA inhaler Take 2 Puffs by inhalation two (2) times a day. 1 Inhaler 1    atorvastatin (LIPITOR) 20 mg tablet Take 1 Tab by mouth daily for 90 days. 90 Tab 0    furosemide (LASIX) 20 mg tablet Take 1 Tab by mouth daily for 90 days. 90 Tab 0    FLUoxetine (PROZAC) 20 mg capsule Take 1 Cap by mouth daily for 90 days. 90 Cap 0    esomeprazole (NEXIUM) 40 mg capsule Take 1 Cap by mouth daily for 90 days. 90 Cap 1    indomethacin (INDOCIN) 25 mg/5 mL oral suspension Take  by mouth.  oxyCODONE IR (ROXICODONE) 10 mg tab immediate release tablet 1-2 tabs every 3hrs as needed for pain. 100 Tab 0    docusate sodium (COLACE) 50 mg capsule Take two tabs twice daily for two weeks, then twice a day as needed thereafter. Hold for loose stools. 60 Cap 2    ondansetron (ZOFRAN ODT) 8 mg disintegrating tablet Take 1 Tab by mouth every eight (8) hours as needed for Nausea.  20 Tab 2     Allergies   Allergen Reactions    Ciprofloxacin Swelling    Penicillins Swelling     Unsure of reaction    Sulfa (Sulfonamide Antibiotics) Unknown (comments)     She doesn't remember       No family history on file.  Social History   Substance Use Topics    Smoking status: Never Smoker    Smokeless tobacco: Never Used    Alcohol use Yes      Comment: glass of wine each day     Patient Active Problem List   Diagnosis Code    Fracture T14. 8XXA    HTN (hypertension) I10    Hyperlipidemia LDL goal <130 E78.5    Dysthymia F34.1    Advanced care planning/counseling discussion Z71.89    Obesity, morbid ([facility] [address] E66.01       Depression Risk Factor Screening:     PHQ over the last two weeks 4/10/2017   Little interest or pleasure in doing things Not at all   Feeling down, depressed or hopeless Not at all   Total Score PHQ 2 0     Alcohol Risk Factor Screening: You do not drink alcohol or very rarely. Functional Ability and Level of Safety:   Hearing Loss  Hearing is good. Activities of Daily Living  The home contains: handrails and grab bars  Patient does total self care    Fall Risk  Fall Risk Assessment, last 12 mths 4/10/2018   Able to walk? Yes   Fall in past 12 months? No   Fall with injury? -   Number of falls in past 12 months -   Fall Risk Score -       Abuse Screen  Patient is not abused    Cognitive Screening   Evaluation of Cognitive Function:  Has your family/caregiver stated any concerns about your memory: no  Normal    Patient Care Team   Patient Care Team:  Gonzalo Bedolla MD as PCP - General (Internal Medicine)    Assessment/Plan   Education and counseling provided:  Are appropriate based on today's review and evaluation  End-of-Life planning (with patient's consent)    Diagnoses and all orders for this visit:    1. Medicare annual wellness visit, subsequent    2. Essential hypertension    3. Hyperlipidemia LDL goal <130    4. Dysthymia    5.  Obesity, morbid [facility]        Health Maintenance Due   Topic Date Due    COLONOSCOPY  09/29/1963    BREAST CANCER SCRN MAMMOGRAM  09/29/1996    ZOSTER VACCINE AGE 60>  07/29/2006    GLAUCOMA SCREENING Q2Y  09/29/2011    Bone Densitometry (Dexa) Screening 09/29/2011    Influenza Age 9 to Adult  08/01/2017    Pneumococcal 65+ Low/Medium Risk (2 of 2 - PCV13) 04/10/2018

## 2018-04-12 ENCOUNTER — HOSPITAL ENCOUNTER (OUTPATIENT)
Dept: LAB | Age: 73
Discharge: HOME | End: 2018-04-12
Payer: MEDICARE

## 2018-04-12 PROCEDURE — 81001 URINALYSIS AUTO W/SCOPE: CPT

## 2018-04-12 PROCEDURE — 83036 HEMOGLOBIN GLYCOSYLATED A1C: CPT

## 2018-04-12 PROCEDURE — 85027 COMPLETE CBC AUTOMATED: CPT

## 2018-04-12 PROCEDURE — 80053 COMPREHEN METABOLIC PANEL: CPT

## 2018-04-12 PROCEDURE — 80061 LIPID PANEL: CPT

## 2018-04-12 PROCEDURE — 36415 COLL VENOUS BLD VENIPUNCTURE: CPT

## 2018-04-13 LAB
ALBUMIN SERPL-MCNC: 4.1 G/DL (ref 3.5–4.8)
ALBUMIN/GLOB SERPL: 1.4 {RATIO} (ref 1.2–2.2)
ALP SERPL-CCNC: 71 IU/L (ref 39–117)
ALT SERPL-CCNC: 35 IU/L (ref 0–32)
APPEARANCE UR: CLEAR
AST SERPL-CCNC: 25 IU/L (ref 0–40)
BACTERIA #/AREA URNS HPF: ABNORMAL /[HPF]
BILIRUB SERPL-MCNC: 0.4 MG/DL (ref 0–1.2)
BILIRUB UR QL STRIP: NEGATIVE
BUN SERPL-MCNC: 19 MG/DL (ref 8–27)
BUN/CREAT SERPL: 27 (ref 12–28)
CALCIUM SERPL-MCNC: 9.5 MG/DL (ref 8.7–10.3)
CASTS URNS QL MICRO: ABNORMAL /LPF
CHLORIDE SERPL-SCNC: 104 MMOL/L (ref 96–106)
CHOLEST SERPL-MCNC: 223 MG/DL (ref 100–199)
CO2 SERPL-SCNC: 27 MMOL/L (ref 18–29)
COLOR UR: YELLOW
CREAT SERPL-MCNC: 0.71 MG/DL (ref 0.57–1)
EPI CELLS #/AREA URNS HPF: ABNORMAL /HPF
ERYTHROCYTE [DISTWIDTH] IN BLOOD BY AUTOMATED COUNT: 13.8 % (ref 12.3–15.4)
EST. AVERAGE GLUCOSE BLD GHB EST-MCNC: 123 MG/DL
GFR SERPLBLD CREATININE-BSD FMLA CKD-EPI: 86 ML/MIN/1.73
GFR SERPLBLD CREATININE-BSD FMLA CKD-EPI: 99 ML/MIN/1.73
GLOBULIN SER CALC-MCNC: 2.9 G/DL (ref 1.5–4.5)
GLUCOSE SERPL-MCNC: 129 MG/DL (ref 65–99)
GLUCOSE UR QL: NEGATIVE
HBA1C MFR BLD: 5.9 % (ref 4.8–5.6)
HCT VFR BLD AUTO: 42.8 % (ref 34–46.6)
HDLC SERPL-MCNC: 58 MG/DL
HGB BLD-MCNC: 13.8 G/DL (ref 11.1–15.9)
HGB UR QL STRIP: ABNORMAL
INTERPRETATION, 910389: NORMAL
KETONES UR QL STRIP: NEGATIVE
LDLC SERPL CALC-MCNC: 146 MG/DL (ref 0–99)
LEUKOCYTE ESTERASE UR QL STRIP: NEGATIVE
MCH RBC QN AUTO: 29.5 PG (ref 26.6–33)
MCHC RBC AUTO-ENTMCNC: 32.2 G/DL (ref 31.5–35.7)
MCV RBC AUTO: 92 FL (ref 79–97)
MICRO URNS: ABNORMAL
MUCOUS THREADS URNS QL MICRO: PRESENT
NITRITE UR QL STRIP: NEGATIVE
PH UR STRIP: 5.5 [PH] (ref 5–7.5)
PLATELET # BLD AUTO: 272 X10E3/UL (ref 150–379)
POTASSIUM SERPL-SCNC: 5.5 MMOL/L (ref 3.5–5.2)
PROT SERPL-MCNC: 7 G/DL (ref 6–8.5)
PROT UR QL STRIP: ABNORMAL
RBC # BLD AUTO: 4.68 X10E6/UL (ref 3.77–5.28)
RBC #/AREA URNS HPF: ABNORMAL /HPF
SODIUM SERPL-SCNC: 145 MMOL/L (ref 134–144)
SP GR UR: 1.03 (ref 1–1.03)
TRIGL SERPL-MCNC: 96 MG/DL (ref 0–149)
UROBILINOGEN UR STRIP-MCNC: 0.2 MG/DL (ref 0.2–1)
VLDLC SERPL CALC-MCNC: 19 MG/DL (ref 5–40)
WBC # BLD AUTO: 6.5 X10E3/UL (ref 3.4–10.8)
WBC #/AREA URNS HPF: ABNORMAL /HPF

## 2018-04-17 NOTE — PROGRESS NOTES
Can you call her ? I tried but could not get in touch yesterday- her urine showed some red cells in it ? Is that something she had before?  Rest of labs are stable- lipids are getting better and slight prediabetes- we can send a copy home

## 2018-04-19 ENCOUNTER — TELEPHONE (OUTPATIENT)
Dept: INTERNAL MEDICINE CLINIC | Age: 73
End: 2018-04-19

## 2018-04-19 NOTE — TELEPHONE ENCOUNTER
Spoke with the wife of patients emergency contact (son frantz) and asked her if she could contact [place] and have her call our office to discuss recent labs.

## 2018-04-20 ENCOUNTER — TELEPHONE (OUTPATIENT)
Dept: INTERNAL MEDICINE CLINIC | Age: 73
End: 2018-04-20

## 2018-04-20 NOTE — TELEPHONE ENCOUNTER
----- Message from Melvi Connor sent at 4/20/2018 10:59 AM EDT -----  Regarding: Dr. Inna Calderon returning a call from nurse in regards to lab work.      Best contact number: [phone]

## 2018-04-20 NOTE — TELEPHONE ENCOUNTER
Verified 3 Pt ID. Notified Pt of lab results. She denies having a hx of red blood cells in urine. She reports hx of diverticulitis which resulted in removal of part of her intestines several yrs ago. Pt has scheduled appt with gyn on 5/4/18. Lab results have been mailed to Pt.

## 2018-04-23 NOTE — TELEPHONE ENCOUNTER
Contact pt and discuss Dr. De Los Santos Smoker recommendation for evaluation with Urology even though seeing GYN soon. Pt understood and will schedule with [facility].

## 2018-05-04 ENCOUNTER — OFFICE VISIT (OUTPATIENT)
Dept: OBGYN CLINIC | Age: 73
End: 2018-05-04

## 2018-05-04 VITALS
HEIGHT: 59 IN | WEIGHT: 290 LBS | BODY MASS INDEX: 58.46 KG/M2 | SYSTOLIC BLOOD PRESSURE: 130 MMHG | DIASTOLIC BLOOD PRESSURE: 74 MMHG

## 2018-05-04 DIAGNOSIS — N95.0 POST-MENOPAUSAL BLEEDING: Primary | ICD-10-CM

## 2018-05-04 DIAGNOSIS — E66.01 OBESITY, MORBID, BMI 50 OR HIGHER (HCC): ICD-10-CM

## 2018-05-04 DIAGNOSIS — R31.9 HEMATURIA, UNSPECIFIED TYPE: ICD-10-CM

## 2018-05-04 LAB
BILIRUB UR QL STRIP: NEGATIVE
GLUCOSE UR-MCNC: NEGATIVE MG/DL
KETONES P FAST UR STRIP-MCNC: NORMAL MG/DL
PH UR STRIP: NORMAL [PH] (ref 4.6–8)
PROT UR QL STRIP: NEGATIVE
SP GR UR STRIP: NORMAL (ref 1–1.03)
UA UROBILINOGEN AMB POC: NORMAL (ref 0.2–1)
URINALYSIS CLARITY POC: CLEAR
URINALYSIS COLOR POC: YELLOW
URINE BLOOD POC: NORMAL
URINE LEUKOCYTES POC: NEGATIVE
URINE NITRITES POC: NEGATIVE

## 2018-05-04 NOTE — PROGRESS NOTES
[facility]  [email or web address]  [phone]    Magaly Marin MD, FACOG       OB/GYN Problem visit    Chief Complaint:   Chief Complaint   Patient presents with    Post Menopausal Bleeding    Hematuria       History of Present Illness: This is a new problem being evaluated by this provider. The patient is a 71 y.o. No obstetric history on file. female who reports having vaginal spotting for about 1 week at the beginning of April. Pt states her PCP sent her for evaluation with the urologist and GYN. Pt states she went to the urologist last week, and there was a small amount of blood in her urine. She reports the symptoms are has improved. Aggravating factors include none. Alleviating factors include none. Moved from [place]: sons in [place],   Former special . She does not have other concerns. No recent gyn exam/pap or mmg. She reports no new sexual partners.  x5 years after 39 years of marriage. LMP: No LMP recorded. Patient is not currently having periods (Reason: Menopause).     PFSH:  Past Medical History:   Diagnosis Date    Asthma     COPD (chronic obstructive pulmonary disease) ([address]     Diverticulitis     Hyperlipidemia     Hyperlipidemia LDL goal <130 3/17/2016    Hypertension     Psychiatric disorder     Depression     Past Surgical History:   Procedure Laterality Date    HX COLECTOMY      Partial    HX HEENT      HX MENISCECTOMY      left knee     Family History   Problem Relation Age of Onset    Hypertension Mother     Alzheimer Mother     Hypertension Father     Cancer Father      Prostate     Social History   Substance Use Topics    Smoking status: Never Smoker    Smokeless tobacco: Never Used    Alcohol use Yes      Comment: glass of wine each day     Allergies   Allergen Reactions    Ciprofloxacin Swelling    Penicillins Swelling     Unsure of reaction    Sulfa (Sulfonamide Antibiotics) Unknown (comments)     She doesn't remember       Current Outpatient Prescriptions   Medication Sig    olmesartan (BENICAR) 20 mg tablet Take 1 Tab by mouth daily.  atorvastatin (LIPITOR) 20 mg tablet Take 1 Tab by mouth daily for 90 days.  furosemide (LASIX) 20 mg tablet Take 1 Tab by mouth daily for 90 days.  FLUoxetine (PROZAC) 20 mg capsule Take 1 Cap by mouth daily for 90 days.  esomeprazole (NEXIUM) 40 mg capsule Take 1 Cap by mouth daily for 90 days. No current facility-administered medications for this visit. Review of Systems:  History obtained from the patient  Constitutional: negative for fevers, chills and weight loss  ENT ROS: negative for - hearing change, oral lesions or visual changes  Respiratory: gets SOB at times  Cardiovascular: negative for chest pain, irregular heart beats, exertional chest pressure/discomfort  Gastrointestinal: negative for dysphagia, nausea and vomiting  Genito-Urinary ROS:  see HPI  Inteument/breast: negative for rash, breast lump and nipple discharge  Musculoskeletal; trouble walking, using a cane  Endocrine ROS: negative for - breast changes, galactorrhea or temperature intolerance  Hematological and Lymphatic ROS: negative for - blood clots, bruising or swollen lymph nodes    Physical Exam:  Visit Vitals    /74    Ht 4' 11\" (1.499 m)    Wt 290 lb (131.5 kg)    BMI 58.57 kg/m2       GENERAL: alert, well appearing, and in no distress  HEAD: normocephalic, atraumatic.    PULM: clear to auscultation, no wheezes, rales or rhonchi, symmetric air entry   COR: normal rate and regular rhythm, S1 and S2 normal   ABDOMEN: soft, nontender, nondistended, no masses or organomegaly   EGBUS: no lesions, no inflammation, no masses  VULVA: normal appearing vulva with no masses, tenderness or lesions  VAGINA: normal appearing vagina with normal color, no lesions, thin white discharge  CERVIX: normal appearing cervix without discharge or lesions, non tender  UTERUS: uterus is normal size, shape, consistency and nontender   ADNEXA: normal adnexa in size, nontender and no masses, exam limited by habitus  NEURO: alert, oriented, normal speech    Assessment:  Encounter Diagnoses   Name Primary?  Post-menopausal bleeding Yes    Obesity, morbid, BMI 50 or higher (HCC)     Hematuria, unspecified type        Plan:  The patient is advised that she should contact the office if she does not note improvement or if symptoms recur  Recommend follow up with PCP for non-gynecologic complaints and chronic medical problems. She should contact our office with any questions or concerns  She could keep her routine annual exam appointment. Pap done: over due  FU ae/mmg 1-2 mos  Consider SIS/Endo bx, h/o given x2, await urology work up/us  Rec PCP fu for sob or to er if severe  Disc importance of  vaginal bleeding, unclear etiology   Notified pt + blood in urine today, defer to urology       Orders Placed This Encounter    PAP, IG, RFX HPV ASCUS ([id number])       No results found for this visit on 05/04/18.

## 2018-05-04 NOTE — PATIENT INSTRUCTIONS
Vaginal Bleeding After Menopause: Care Instructions  Your Care Instructions    Vaginal bleeding after menopause can have many causes. Causes may include infection, inflammation, prescription hormones, abnormal growths, and injury. Your doctor may want you to have more tests to find the cause of your vaginal bleeding. Follow-up care is a key part of your treatment and safety. Be sure to make and go to all appointments, and call your doctor if you are having problems. It's also a good idea to know your test results and keep a list of the medicines you take. How can you care for yourself at home? · If your doctor gave you medicine, take it exactly as prescribed. Call your doctor if you think you are having a problem with your medicine. · Do not have sex or put anything inside your vagina until you talk with your doctor. · Do not douche. When should you call for help? Call 911 anytime you think you may need emergency care. For example, call if:  ? · You passed out (lost consciousness). ?Call your doctor now or seek immediate medical care if:  ? · You have severe vaginal bleeding. ? · You are dizzy or lightheaded, or you feel like you may faint. ? · You have new or worse belly or pelvic pain. ? Watch closely for changes in your health, and be sure to contact your doctor if:  ? · Your bleeding gets worse. ? · You think you might be pregnant. ? · You do not get better as expected. Where can you learn more? Go to http://gena-estefani.info/. Enter N304 in the search box to learn more about \"Vaginal Bleeding After Menopause: Care Instructions. \"  Current as of: October 13, 2016  Content Version: 11.4  © 8817-3428 [employer]wise, Incorporated. Care instructions adapted under license by [employer] (which disclaims liability or warranty for this information).  If you have questions about a medical condition or this instruction, always ask your healthcare professional. [employer] disclaims any warranty or liability for your use of this information.

## 2018-05-04 NOTE — MR AVS SNAPSHOT
[address] [facility] [address] [facility] 
[phone] Patient: Js Ernst MRN: [id number] [facility]:[id number] Visit Information Date & Time Provider Department Dept. Phone Encounter #  
 5/4/2018 10:30 AM Colby Platt MD [facility] [phone] [id number] Your Appointments 5/8/2018  2:00 PM  
ROUTINE CARE with Meri Dominguez MD  
[facility] [address]) Appt Note: 1 month [address] [facility] [address] [address]  
[phone]  
  
   
 [address] [facility] [address] Upcoming Health Maintenance Date Due COLONOSCOPY 9/29/1964 BREAST CANCER SCRN MAMMOGRAM 9/29/1996 ZOSTER VACCINE AGE 60> 7/29/2006 Bone Densitometry (Dexa) Screening 9/29/2011 Influenza Age 5 to Adult 8/1/2018 Allergies as of 5/4/2018  Review Complete On: 5/4/2018 By: Renny Fierro LPN Severity Noted Reaction Type Reactions Ciprofloxacin  02/14/2016    Swelling Penicillins  02/14/2016    Swelling Unsure of reaction Sulfa (Sulfonamide Antibiotics)  02/19/2016    Unknown (comments) She doesn't remember Current Immunizations  Never Reviewed Name Date Influenza High Dose Vaccine PF 10/10/2017 Pneumococcal Polysaccharide (PPSV-23) 4/10/2017 Tdap 10/16/2013 Not reviewed this visit Vitals BP Height(growth percentile) Weight(growth percentile) BMI OB Status Smoking Status 130/74 4' 11\" (1.499 m) 290 lb (131.5 kg) 58.57 kg/m2 Menopause Never Smoker BMI and BSA Data Body Mass Index Body Surface Area 58.57 kg/m 2 2.34 m 2 Preferred Pharmacy Pharmacy Name Phone [id number] [place] ([address]) [phone] Your Updated Medication List  
  
   
 This list is accurate as of 5/4/18 10:55 AM.  Always use your most recent med list.  
  
  
  
  
 atorvastatin 20 mg tablet Commonly known as:  LIPITOR Take 1 Tab by mouth daily for 90 days. budesonide-formoterol 80-4.5 mcg/actuation Hfaa Commonly known as:  SYMBICORT Take 2 Puffs by inhalation two (2) times a day. docusate sodium 50 mg capsule Commonly known as:  Gage Stai Take two tabs twice daily for two weeks, then twice a day as needed thereafter. Hold for loose stools. esomeprazole 40 mg capsule Commonly known as:  Sifuentes Fabry Take 1 Cap by mouth daily for 90 days. FLUoxetine 20 mg capsule Commonly known as:  PROzac Take 1 Cap by mouth daily for 90 days. furosemide 20 mg tablet Commonly known as:  LASIX Take 1 Tab by mouth daily for 90 days. indomethacin 25 mg/5 mL oral suspension Commonly known as:  INDOCIN Take  by mouth. olmesartan 20 mg tablet Commonly known as:  [employer] Take 1 Tab by mouth daily. ondansetron 8 mg disintegrating tablet Commonly known as:  ZOFRAN ODT Take 1 Tab by mouth every eight (8) hours as needed for Nausea. oxyCODONE IR 10 mg Tab immediate release tablet Commonly known as:  ROXICODONE  
1-2 tabs every 3hrs as needed for pain. Patient Instructions Vaginal Bleeding After Menopause: Care Instructions Your Care Instructions Vaginal bleeding after menopause can have many causes. Causes may include infection, inflammation, prescription hormones, abnormal growths, and injury. Your doctor may want you to have more tests to find the cause of your vaginal bleeding. Follow-up care is a key part of your treatment and safety. Be sure to make and go to all appointments, and call your doctor if you are having problems. It's also a good idea to know your test results and keep a list of the medicines you take. How can you care for yourself at home? · If your doctor gave you medicine, take it exactly as prescribed. Call your doctor if you think you are having a problem with your medicine. · Do not have sex or put anything inside your vagina until you talk with your doctor. · Do not douche. When should you call for help? Call 911 anytime you think you may need emergency care. For example, call if: 
? · You passed out (lost consciousness). ?Call your doctor now or seek immediate medical care if: 
? · You have severe vaginal bleeding. ? · You are dizzy or lightheaded, or you feel like you may faint. ? · You have new or worse belly or pelvic pain. ? Watch closely for changes in your health, and be sure to contact your doctor if: 
? · Your bleeding gets worse. ? · You think you might be pregnant. ? · You do not get better as expected. Where can you learn more? Go to http://gena-estefani.info/. Enter N304 in the search box to learn more about \"Vaginal Bleeding After Menopause: Care Instructions. \" Current as of: October 13, 2016 Content Version: 11.4 © 9530-9567 [employer]. Care instructions adapted under license by [employer] (which disclaims liability or warranty for this information). If you have questions about a medical condition or this instruction, always ask your healthcare professional. [address] any warranty or liability for your use of this information. Introducing [facility]! Jamal Zhong introduces Videostrip patient portal. Now you can access parts of your medical record, email your doctor's office, and request medication refills online. 1. In your internet browser, go to [email or web address] 2. Click on the First Time User? Click Here link in the Sign In box. You will see the New Member Sign Up page. 3. Enter your Videostrip Access Code exactly as it appears below.  You will not need to use this code after youve completed the sign-up process. If you do not sign up before the expiration date, you must request a new code. · TBS Access Code: [email or web address] Expires: 8/2/2018 10:55 AM 
 
4. Enter the last four digits of your Social Security Number (xxxx) and Date of Birth (mm/dd/yyyy) as indicated and click Submit. You will be taken to the next sign-up page. 5. Create a TBS ID. This will be your TBS login ID and cannot be changed, so think of one that is secure and easy to remember. 6. Create a TBS password. You can change your password at any time. 7. Enter your Password Reset Question and Answer. This can be used at a later time if you forget your password. 8. Enter your e-mail address. You will receive e-mail notification when new information is available in [address] 9. Click Sign Up. You can now view and download portions of your medical record. 10. Click the Download Summary menu link to download a portable copy of your medical information. If you have questions, please visit the Frequently Asked Questions section of the TBS website. Remember, TBS is NOT to be used for urgent needs. For medical emergencies, dial 911. Now available from your iPhone and Android! Please provide this summary of care documentation to your next provider. Your primary care clinician is listed as Sifuenteshandy Mccartney. If you have any questions after today's visit, please call [phone].

## 2018-05-04 NOTE — Clinical Note
pls add ur dip (blood) and [facility] will [facility]trans vaginal pelvic imaging with their US at [place]: if not needs to be done here.

## 2018-05-08 ENCOUNTER — OFFICE VISIT (OUTPATIENT)
Dept: INTERNAL MEDICINE CLINIC | Age: 73
End: 2018-05-08

## 2018-05-08 VITALS
TEMPERATURE: 98.6 F | HEART RATE: 98 BPM | RESPIRATION RATE: 14 BRPM | WEIGHT: 288 LBS | HEIGHT: 59 IN | OXYGEN SATURATION: 97 % | DIASTOLIC BLOOD PRESSURE: 70 MMHG | SYSTOLIC BLOOD PRESSURE: 117 MMHG | BODY MASS INDEX: 58.06 KG/M2

## 2018-05-08 DIAGNOSIS — R19.7 DIARRHEA, UNSPECIFIED TYPE: ICD-10-CM

## 2018-05-08 DIAGNOSIS — F34.1 DYSTHYMIA: ICD-10-CM

## 2018-05-08 DIAGNOSIS — R31.9 HEMATURIA, UNSPECIFIED TYPE: ICD-10-CM

## 2018-05-08 DIAGNOSIS — I10 ESSENTIAL HYPERTENSION: Primary | Chronic | ICD-10-CM

## 2018-05-08 DIAGNOSIS — N93.9 VAGINAL BLEEDING: ICD-10-CM

## 2018-05-08 NOTE — PROGRESS NOTES
HISTORY OF PRESENT ILLNESS  Carmen Prieto is a 71 y.o. female. HPI    Patient will follow up for uterine biopsy, mamogram, and CT scan. Patient has had PAP by GYN and is awaiting biopsy. Patient has a CT scan ordered (5/24) by urology because of hematuria. Patient started Prozac 20 mg. She notes she has been having nausea since starting meds. Patient reports since resuming medications, her stool has been more loose. Patien notes she would have loose stools occasionally when she was on medications initially. She has noticed more abdominal cramping and loose stool. Denies blood in stool, darker stool, abdominal pain. GERD: Patient has used Nexium once since last visit and TUMS once. Patient notes reflux is well controlled. Hypertension ROS: taking medications as instructed, no medication side effects noted, no TIA's, no chest pain on exertion, no dyspnea on exertion, no swelling of ankles. New concerns:  Patient's BP in office today is 117/70. Review of Systems   All other systems reviewed and are negative. Physical Exam   Constitutional: She is oriented to person, place, and time. She appears well-developed and well-nourished. HENT:   Head: Normocephalic and atraumatic. Right Ear: External ear normal.   Left Ear: External ear normal.   Nose: Nose normal.   Mouth/Throat: Oropharynx is clear and moist.   Eyes: Conjunctivae and EOM are normal.   Neck: Normal range of motion. Neck supple. Carotid bruit is not present. No thyroid mass and no thyromegaly present. Cardiovascular: Normal rate, regular rhythm, S1 normal, S2 normal, normal heart sounds and intact distal pulses. Pulmonary/Chest: Effort normal and breath sounds normal.   Abdominal: Soft. Normal appearance and bowel sounds are normal. There is no hepatosplenomegaly. There is no tenderness. Musculoskeletal: Normal range of motion. Neurological: She is alert and oriented to person, place, and time. She has normal strength.  No cranial nerve deficit or sensory deficit. Coordination normal.   Skin: Skin is warm, dry and intact. No abrasion and no rash noted. Psychiatric: She has a normal mood and affect. Her behavior is normal. Judgment and thought content normal.   Nursing note and vitals reviewed. ASSESSMENT and PLAN  Diagnoses and all orders for this visit:    1. Essential hypertension  BP is at goal. I do not recommend any change in medications. 2. Hematuria, unspecified type  Patient will follow up for CT scan on 5/24, per urology. She will follow up with me after all imaging and biopsies completed. 3. Vaginal bleeding  Patient will have uterine biopsy conducted, she has had PAP and followed by by Dr. Eulalia Weeks. She will follow up after all imaging and biopsies completed. 4. Dysthymia  She notes her mood is stable. Patient is having nausea from starting Prozac. She acknowledges nausea and will continue to monitor and continue Prozac. 5. Diarrhea, unspecified type  Will continue to monitor. lab results and schedule of future lab studies reviewed with patient  reviewed diet, exercise and weight control    Written by hNi Camp, as dictated by Luciana Forde MD.     Current diagnosis and concerns discussed with pt at length. Understands risks and benefits or current treatment plan and medications and accepts the treatment and medication with any possible risks. Pt asks appropriate questions which were answered. Pt instructed to call with any concerns or problems.

## 2018-05-10 LAB
CYTOLOGIST CVX/VAG CYTO: NORMAL
CYTOLOGY CVX/VAG DOC THIN PREP: NORMAL
DX ICD CODE: NORMAL
LABCORP, 190119: NORMAL
Lab: NORMAL
Lab: NORMAL
OTHER STN SPEC: NORMAL
PATH REPORT.FINAL DX SPEC: NORMAL
STAT OF ADQ CVX/VAG CYTO-IMP: NORMAL

## 2018-05-29 ENCOUNTER — OFFICE VISIT (OUTPATIENT)
Dept: INTERNAL MEDICINE CLINIC | Age: 73
End: 2018-05-29

## 2018-05-29 VITALS
WEIGHT: 288 LBS | BODY MASS INDEX: 58.06 KG/M2 | SYSTOLIC BLOOD PRESSURE: 149 MMHG | HEIGHT: 59 IN | HEART RATE: 85 BPM | RESPIRATION RATE: 14 BRPM | TEMPERATURE: 99.3 F | DIASTOLIC BLOOD PRESSURE: 69 MMHG | OXYGEN SATURATION: 95 %

## 2018-05-29 DIAGNOSIS — R60.0 EDEMA LEG: ICD-10-CM

## 2018-05-29 DIAGNOSIS — E78.5 HYPERLIPIDEMIA LDL GOAL <130: Chronic | ICD-10-CM

## 2018-05-29 DIAGNOSIS — D22.9 ATYPICAL MOLE: ICD-10-CM

## 2018-05-29 DIAGNOSIS — I10 ESSENTIAL HYPERTENSION: Primary | Chronic | ICD-10-CM

## 2018-05-29 DIAGNOSIS — N93.9 VAGINAL BLEEDING: ICD-10-CM

## 2018-05-29 NOTE — PROGRESS NOTES
HISTORY OF PRESENT ILLNESS  Yennifer Khan is a 71 y.o. female. HPI   Tauna Heimlich is going to do a D and C and is concerned about the lining of the uterus -she is going to have a D and C and take a look at the lining   LE edema- she does take her lasix but does not work completely   Subjective: Yennifer Khan is a 71 y.o. female with hypertension. Hypertension ROS: taking medications as instructed, no medication side effects noted, no TIA's, no chest pain on exertion, no dyspnea on exertion, no swelling of ankles. New concerns: stable. Subjective: Yennifer Khan is a 71 y.o. female with hyperlipidemia. Cardiovascular risk analysis - 71 y.o. female LDL goal is under 130. ROS: taking medications as instructed, no medication side effects noted, no TIA's, no chest pain on exertion, no dyspnea on exertion, no swelling of ankles. Tolerating meds, no myalgias or other side effects noted  New concerns: stable. Anxiety- she is feeling overwhelmed with all the medical tests and we discussed taking it one step at a time - this is right steps forward     Review of Systems   Constitutional: Negative. Negative for chills, diaphoresis, fever, malaise/fatigue and weight loss. HENT: Negative for congestion, nosebleeds and tinnitus. Eyes: Negative for blurred vision, double vision and photophobia. Respiratory: Negative for cough, hemoptysis, sputum production, shortness of breath and wheezing. Cardiovascular: Negative for chest pain, palpitations, orthopnea, claudication, leg swelling and PND. Gastrointestinal: Negative for abdominal pain, blood in stool, constipation, diarrhea, heartburn, melena, nausea and vomiting. Genitourinary: Negative for dysuria, frequency, hematuria and urgency. Musculoskeletal: Negative for back pain, joint pain, myalgias and neck pain. Skin: Negative for itching and rash.    Neurological: Negative for dizziness, tingling, sensory change, speech change, focal weakness, weakness and headaches. Endo/Heme/Allergies: Negative for polydipsia. Does not bruise/bleed easily. Psychiatric/Behavioral: Negative for depression. The patient is not nervous/anxious and does not have insomnia. Physical Exam   Constitutional: She is oriented to person, place, and time. She appears well-developed and well-nourished. HENT:   Head: Normocephalic and atraumatic. Right Ear: External ear normal.   Left Ear: External ear normal.   Nose: Nose normal.   Mouth/Throat: Oropharynx is clear and moist.   Neck: Normal range of motion. Neck supple. No JVD present. Carotid bruit is not present. No thyroid mass and no thyromegaly present. Cardiovascular: Normal rate, regular rhythm, S1 normal, S2 normal, normal heart sounds, intact distal pulses and normal pulses. Exam reveals no gallop and no friction rub. No murmur heard. Pulmonary/Chest: Effort normal and breath sounds normal.   Abdominal: Soft. Bowel sounds are normal.   Musculoskeletal: Normal range of motion. She exhibits edema. Neurological: She is alert and oriented to person, place, and time. She has normal strength. Skin: Skin is warm and dry. Atypical mole on right lower leg   Psychiatric: She has a normal mood and affect. Her behavior is normal. Judgment and thought content normal.   Nursing note and vitals reviewed. ASSESSMENT and PLAN  Diagnoses and all orders for this visit:    1. Essential hypertension- at goal stable    2. Hyperlipidemia LDL goal <130-cont with current medicine tolerating it    3. Edema leg- recommend needs stockings fitted at [address]    4. Vaginal bleeding- she is going to get  D and C and going from there to look at the uterine site; she had one episode and none since    5.  Atypical mole-refer to  once her vaginal bleeding is assessed      lab results and schedule of future lab studies reviewed with patient  reviewed diet, exercise and weight control  cardiovascular risk and specific lipid/LDL goals reviewed  reviewed medications and side effects in detail

## 2018-06-06 ENCOUNTER — OFFICE VISIT (OUTPATIENT)
Dept: INTERNAL MEDICINE CLINIC | Age: 73
End: 2018-06-06

## 2018-06-06 VITALS
RESPIRATION RATE: 16 BRPM | BODY MASS INDEX: 58.06 KG/M2 | SYSTOLIC BLOOD PRESSURE: 134 MMHG | WEIGHT: 288 LBS | DIASTOLIC BLOOD PRESSURE: 67 MMHG | TEMPERATURE: 98.4 F | OXYGEN SATURATION: 98 % | HEIGHT: 59 IN | HEART RATE: 81 BPM

## 2018-06-06 DIAGNOSIS — J20.8 ACUTE BRONCHITIS DUE TO OTHER SPECIFIED ORGANISMS: Primary | ICD-10-CM

## 2018-06-06 DIAGNOSIS — I10 ESSENTIAL HYPERTENSION: Chronic | ICD-10-CM

## 2018-06-06 RX ORDER — AZITHROMYCIN 250 MG/1
250 TABLET, FILM COATED ORAL SEE ADMIN INSTRUCTIONS
Qty: 6 TAB | Refills: 0 | Status: SHIPPED | OUTPATIENT
Start: 2018-06-06 | End: 2018-06-11

## 2018-06-06 NOTE — PROGRESS NOTES
HISTORY OF PRESENT ILLNESS  Chikis Zhao is a 71 y.o. female. HPI   Patient reports cough started Monday afternoon. She notes cough is intermittent and dry when it starts. Patient states the cough is repetitive and small, clear sputum production. Denies fever, chills, body aches. She has [facility] and surgery tomorrow. Hypertension ROS: taking medications as instructed, no medication side effects noted, no TIA's, no chest pain on exertion, no dyspnea on exertion, no swelling of ankles. New concerns:  Patient's BP in office today is 134/67. Patient agrees to follow up for colonoscopy after upcoming procedures. Review of Systems   All other systems reviewed and are negative. Physical Exam   Constitutional: She is oriented to person, place, and time. She appears well-developed and well-nourished. HENT:   Head: Normocephalic and atraumatic. Right Ear: External ear normal.   Left Ear: External ear normal.   Nose: Nose normal.   Mouth/Throat: Oropharynx is clear and moist.   Eyes: Conjunctivae and EOM are normal.   Neck: Normal range of motion. Neck supple. Carotid bruit is not present. No thyroid mass and no thyromegaly present. Cardiovascular: Normal rate, regular rhythm, S1 normal, S2 normal, normal heart sounds and intact distal pulses. Pulmonary/Chest: Effort normal and breath sounds normal.   Abdominal: Soft. Normal appearance and bowel sounds are normal. There is no hepatosplenomegaly. There is no tenderness. Musculoskeletal: Normal range of motion. Neurological: She is alert and oriented to person, place, and time. She has normal strength. No cranial nerve deficit or sensory deficit. Coordination normal.   Skin: Skin is warm, dry and intact. No abrasion and no rash noted. Psychiatric: She has a normal mood and affect. Her behavior is normal. Judgment and thought content normal.   Nursing note and vitals reviewed. ASSESSMENT and PLAN  Diagnoses and all orders for this visit:    1. Acute bronchitis due to other specified organisms  Concerned about cough and upcoming sedation for surgery tomorrow. Will start treatment of azithromycin. Patient will notify surgeon of treatment. -     azithromycin (ZITHROMAX) 250 mg tablet; Take 1 Tab by mouth See Admin Instructions for 5 days. 2. Essential hypertension  BP is at goal. I do not recommend any change in medications. lab results and schedule of future lab studies reviewed with patient  reviewed diet, exercise and weight control    Written by Bhumika Holley, as dictated by Kathryn Read MD.     Current diagnosis and concerns discussed with pt at length. Understands risks and benefits or current treatment plan and medications and accepts the treatment and medication with any possible risks. Pt asks appropriate questions which were answered. Pt instructed to call with any concerns or problems.

## 2018-06-15 ENCOUNTER — TELEPHONE (OUTPATIENT)
Dept: INTERNAL MEDICINE CLINIC | Age: 73
End: 2018-06-15

## 2018-06-15 NOTE — TELEPHONE ENCOUNTER
Patient needs a name and no of Pulmonary. She has to go and see some one to make sure she is alright for Surgery.  Possible hysterectomy,  Her no is [phone]

## 2018-06-18 ENCOUNTER — TELEPHONE (OUTPATIENT)
Dept: INTERNAL MEDICINE CLINIC | Age: 73
End: 2018-06-18

## 2018-06-18 NOTE — TELEPHONE ENCOUNTER
----- Message from Ariel Curran sent at 6/18/2018  3:24 PM EDT -----  Regarding: Dr. Noriega Line H/[phone]   Pt stated, she is f/up to request placed last Friday to receive a call back with the name and phone # of a pulmonary specialist, if possible in her area. Pt stated, she underwent DNC procedure a wk 1/2 ago and experienced difficulty with breathing.   Pt's Urologist is recommending she undergo a hysterectomy after her appt with a Pulmonary specialist.

## 2018-06-26 ENCOUNTER — TELEPHONE (OUTPATIENT)
Dept: INTERNAL MEDICINE CLINIC | Age: 73
End: 2018-06-26

## 2018-06-26 NOTE — TELEPHONE ENCOUNTER
Patient called regarding a referral to pulmonary specialist. Patient states this is her third time calling, and would like a call back at her home number. Thank you.

## 2018-07-02 ENCOUNTER — TELEPHONE (OUTPATIENT)
Dept: OBGYN CLINIC | Age: 73
End: 2018-07-02

## 2018-07-02 NOTE — TELEPHONE ENCOUNTER
Patient is calling because she has a appointment with TP for discussion of hysterectomy after d&C with Dr. Rosas Oakes. She wants to know if we have records from Dr. Rosas Oakes.       Advised patient that she will need to arrange for records from Rosas Oakes

## 2018-07-03 ENCOUNTER — TELEPHONE (OUTPATIENT)
Dept: OBGYN CLINIC | Age: 73
End: 2018-07-03

## 2018-07-03 ENCOUNTER — OFFICE VISIT (OUTPATIENT)
Dept: OBGYN CLINIC | Age: 73
End: 2018-07-03

## 2018-07-03 VITALS
HEIGHT: 59 IN | BODY MASS INDEX: 58.06 KG/M2 | SYSTOLIC BLOOD PRESSURE: 134 MMHG | DIASTOLIC BLOOD PRESSURE: 68 MMHG | WEIGHT: 288 LBS

## 2018-07-03 DIAGNOSIS — N95.0 PMB (POSTMENOPAUSAL BLEEDING): Primary | ICD-10-CM

## 2018-07-03 DIAGNOSIS — E66.9 OBESITY, UNSPECIFIED CLASSIFICATION, UNSPECIFIED OBESITY TYPE, UNSPECIFIED WHETHER SERIOUS COMORBIDITY PRESENT: ICD-10-CM

## 2018-07-03 DIAGNOSIS — N84.0 ENDOMETRIAL POLYP: ICD-10-CM

## 2018-07-03 RX ORDER — MEGESTROL ACETATE 20 MG/1
20 TABLET ORAL DAILY
Qty: 60 TAB | Refills: 0 | Status: SHIPPED | OUTPATIENT
Start: 2018-07-03 | End: 2020-02-13

## 2018-07-03 NOTE — PATIENT INSTRUCTIONS
Vaginal Bleeding After Menopause: Care Instructions  Your Care Instructions    Vaginal bleeding after menopause can have many causes. Causes may include infection, inflammation, prescription hormones, abnormal growths, and injury. Your doctor may want you to have more tests to find the cause of your vaginal bleeding. Follow-up care is a key part of your treatment and safety. Be sure to make and go to all appointments, and call your doctor if you are having problems. It's also a good idea to know your test results and keep a list of the medicines you take. How can you care for yourself at home? · If your doctor gave you medicine, take it exactly as prescribed. Call your doctor if you think you are having a problem with your medicine. · Do not have sex or put anything inside your vagina until you talk with your doctor. · Do not douche. When should you call for help? Call 911 anytime you think you may need emergency care. For example, call if:  ? · You passed out (lost consciousness). ?Call your doctor now or seek immediate medical care if:  ? · You have severe vaginal bleeding. ? · You are dizzy or lightheaded, or you feel like you may faint. ? · You have new or worse belly or pelvic pain. ? Watch closely for changes in your health, and be sure to contact your doctor if:  ? · Your bleeding gets worse. ? · You think you might be pregnant. ? · You do not get better as expected. Where can you learn more? Go to http://gena-estefani.info/. Enter N304 in the search box to learn more about \"Vaginal Bleeding After Menopause: Care Instructions. \"  Current as of: October 13, 2016  Content Version: 11.4  © 0476-9972 Healthwise, Incorporated. Care instructions adapted under license by [employer] (which disclaims liability or warranty for this information).  If you have questions about a medical condition or this instruction, always ask your healthcare professional. [employer] disclaims any warranty or liability for your use of this information.

## 2018-07-03 NOTE — MR AVS SNAPSHOT
[address] [facility] [address] [place] [address] 
[phone] Patient: Lynn Mayer MRN: [id number] ZFF:[id number] Visit Information Date & Time Provider Department Dept. Phone Encounter #  
 7/3/2018 10:10 AM Josey Montilla MD [address] [place] [address] OB- [id number] Your Appointments 7/23/2018 10:20 AM  
MAMMOGRAPHY with MAMMOGRAM, ISABELLA Maier ([address]) Appt Note: mammo and AE    TP  
 [address] [facility] [address] [facility]  
[phone]  
  
   
 [address] [address]  
  
    
 7/23/2018 10:40 AM  
ESTABLISHED PATIENT with MD Fei Lopez ([address]) Appt Note: mammo and AE    TP  
 [address] [facility] [id number]  
[facility] [address] Upcoming Health Maintenance Date Due ZOSTER VACCINE AGE 60> 7/29/2006 Bone Densitometry (Dexa) Screening 12/6/2018* BREAST CANCER SCRN MAMMOGRAM 12/6/2018* COLONOSCOPY 12/6/2018* Influenza Age 5 to Adult 8/1/2018 *Topic was postponed. The date shown is not the original due date. Allergies as of 7/3/2018  Review Complete On: 7/3/2018 By: Jessy Richardson Severity Noted Reaction Type Reactions Ciprofloxacin  02/14/2016    Swelling Penicillins  02/14/2016    Swelling Unsure of reaction Sulfa (Sulfonamide Antibiotics)  02/19/2016    Unknown (comments) She doesn't remember Current Immunizations  Never Reviewed Name Date Influenza High Dose Vaccine PF 10/10/2017 Pneumococcal Polysaccharide (PPSV-23) 4/10/2017 Tdap 10/16/2013 Not reviewed this visit Vitals BP Height(growth percentile) Weight(growth percentile) BMI OB Status Smoking Status  134/68 4' 11\" (1.499 m) 288 lb (130.6 kg) 58.17 kg/m2 Postmenopausal Never Smoker BMI and BSA Data Body Mass Index Body Surface Area  
 58.17 kg/m 2 2.33 m 2 Preferred Pharmacy Pharmacy Name Phone [id number] [place] [place] [place] [address]) [phone] Your Updated Medication List  
  
   
This list is accurate as of 7/3/18 10:12 AM.  Always use your most recent med list.  
  
  
  
  
 atorvastatin 20 mg tablet Commonly known as:  LIPITOR Take 1 Tab by mouth daily for 90 days. esomeprazole 40 mg capsule Commonly known as:  Sharmila Bergamo Take 1 Cap by mouth daily for 90 days. FLUoxetine 20 mg capsule Commonly known as:  PROzac Take 1 Cap by mouth daily for 90 days. furosemide 20 mg tablet Commonly known as:  LASIX Take 1 Tab by mouth daily for 90 days. olmesartan 20 mg tablet Commonly known as:  BENICAR  
TAKE 1 TAB BY MOUTH DAILY. Patient Instructions Vaginal Bleeding After Menopause: Care Instructions Your Care Instructions Vaginal bleeding after menopause can have many causes. Causes may include infection, inflammation, prescription hormones, abnormal growths, and injury. Your doctor may want you to have more tests to find the cause of your vaginal bleeding. Follow-up care is a key part of your treatment and safety. Be sure to make and go to all appointments, and call your doctor if you are having problems. It's also a good idea to know your test results and keep a list of the medicines you take. How can you care for yourself at home? · If your doctor gave you medicine, take it exactly as prescribed. Call your doctor if you think you are having a problem with your medicine. · Do not have sex or put anything inside your vagina until you talk with your doctor. · Do not douche. When should you call for help? Call 911 anytime you think you may need emergency care. For example, call if: 
? · You passed out (lost consciousness). ?Call your doctor now or seek immediate medical care if: 
? · You have severe vaginal bleeding. ? · You are dizzy or lightheaded, or you feel like you may faint. ? · You have new or worse belly or pelvic pain. ? Watch closely for changes in your health, and be sure to contact your doctor if: 
? · Your bleeding gets worse. ? · You think you might be pregnant. ? · You do not get better as expected. Where can you learn more? Go to http://gena-estefani.info/. Enter N304 in the search box to learn more about \"Vaginal Bleeding After Menopause: Care Instructions. \" Current as of: October 13, 2016 Content Version: 11.4 © 4487-3079 [employer]. Care instructions adapted under license by [employer] (which disclaims liability or warranty for this information). If you have questions about a medical condition or this instruction, always ask your healthcare professional. [place] [address] any warranty or liability for your use of this information. Introducing [facility]! [employer] introduces Compliance Assurance patient portal. Now you can access parts of your medical record, email your doctor's office, and request medication refills online. 1. In your internet browser, go to [email or web address] 2. Click on the First Time User? Click Here link in the Sign In box. You will see the New Member Sign Up page. 3. Enter your Compliance Assurance Access Code exactly as it appears below. You will not need to use this code after youve completed the sign-up process. If you do not sign up before the expiration date, you must request a new code. · Compliance Assurance Access Code: [email or web address] Expires: 8/2/2018 10:55 AM 
 
4. Enter the last four digits of your Social Security Number (xxxx) and Date of Birth (mm/dd/yyyy) as indicated and click Submit. You will be taken to the next sign-up page. 5. Create a Meez ID. This will be your Meez login ID and cannot be changed, so think of one that is secure and easy to remember. 6. Create a Meez password. You can change your password at any time. 7. Enter your Password Reset Question and Answer. This can be used at a later time if you forget your password. 8. Enter your e-mail address. You will receive e-mail notification when new information is available in [address] 9. Click Sign Up. You can now view and download portions of your medical record. 10. Click the Download Summary menu link to download a portable copy of your medical information. If you have questions, please visit the Frequently Asked Questions section of the Meez website. Remember, Meez is NOT to be used for urgent needs. For medical emergencies, dial 911. Now available from your iPhone and Android! Please provide this summary of care documentation to your next provider. Your primary care clinician is listed as Edith Gonzáles. If you have any questions after today's visit, please call [phone].

## 2018-07-03 NOTE — TELEPHONE ENCOUNTER
Message left at 336PM    71year old patient seen in the office today. Patient left a message regarding her prescription for hormone that it needed a prior authorization . This nurse left a message that the prior authorization has been received and is pending. Patient verbalized understanding.

## 2018-07-03 NOTE — PROGRESS NOTES
[facility]  [email or web address]  [phone]    Tulio Neal MD, FACOG       OB/GYN Problem visit    Chief Complaint:   Chief Complaint   Patient presents with    Post Menopausal Bleeding       History of Present Illness: This is a new problem being evaluated by this provider. The patient is a 71 y.o.  female who reports having PMB for a few months. She went to a Urological GYN at [place] [address] who performed a D&C last month. She advised her that her endometrium was 39mm. Since the [facility]  she has had heavy bleeding that lasts everyday. She would like to discuss Hysterectomy or other options. She has requested records be sent to us. She reports the symptoms are is unchanged. Aggravating factors include none. Alleviating factors include none. H/o  bleeding 2+ months ago. No endo bx. US: thickened endometrium. D and C . She does not have other concerns. LMP: No LMP recorded.  Patient is postmenopausal.    PFSH:  Past Medical History:   Diagnosis Date    Asthma     COPD (chronic obstructive pulmonary disease) ([facility] [address]     Diverticulitis     Hyperlipidemia     Hyperlipidemia LDL goal <130 3/17/2016    Hypertension     Pap smear for cervical cancer screening 2018    Negative    Psychiatric disorder     Depression     Past Surgical History:   Procedure Laterality Date    HX COLECTOMY      Partial, adhesions, diverticulitis    HX DILATION AND CURETTAGE  2018    Performed by Dr Tomasz Long at [address] [place] [address] HX HEENT      HX MENISCECTOMY      left knee     Family History   Problem Relation Age of Onset    Hypertension Mother     Alzheimer Mother     Hypertension Father     Cancer Father      Prostate     Social History   Substance Use Topics    Smoking status: Never Smoker    Smokeless tobacco: Never Used    Alcohol use Yes      Comment: glass of wine each day     Allergies   Allergen Reactions    Ciprofloxacin Swelling    Penicillins Swelling     Unsure of reaction    Sulfa (Sulfonamide Antibiotics) Unknown (comments)     She doesn't remember       Current Outpatient Prescriptions   Medication Sig    megestrol (MEGACE) 20 mg tablet Take 1 Tab by mouth daily. Pt can increase to 2tab po every day if still has bleeding    olmesartan (BENICAR) 20 mg tablet TAKE 1 TAB BY MOUTH DAILY.  atorvastatin (LIPITOR) 20 mg tablet Take 1 Tab by mouth daily for 90 days.  furosemide (LASIX) 20 mg tablet Take 1 Tab by mouth daily for 90 days.  FLUoxetine (PROZAC) 20 mg capsule Take 1 Cap by mouth daily for 90 days.  esomeprazole (NEXIUM) 40 mg capsule Take 1 Cap by mouth daily for 90 days. No current facility-administered medications for this visit. Review of Systems:  History obtained from the patient  Constitutional: negative for fevers, chills and weight loss  ENT ROS: negative for - hearing change, oral lesions or visual changes  Respiratory: negative for cough, wheezing or dyspnea on exertion  Cardiovascular: negative for chest pain, irregular heart beats, exertional chest pressure/discomfort  Gastrointestinal: negative for dysphagia, nausea and vomiting  Genito-Urinary ROS:  see HPI  Inteument/breast: negative for rash, breast lump and nipple discharge  Musculoskeletal:negative for stiff joints, neck pain and muscle weakness  Endocrine ROS: negative for - breast changes, galactorrhea or temperature intolerance  Hematological and Lymphatic ROS: negative for - blood clots, bruising or swollen lymph nodes    Physical Exam:  Visit Vitals    /68    Ht 4' 11\" (1.499 m)    Wt 288 lb (130.6 kg)    BMI 58.17 kg/m2       GENERAL: alert, well appearing, and in no distress  HEAD: normocephalic, atraumatic.    PULM: clear to auscultation, no wheezes, rales or rhonchi, symmetric air entry   COR: normal rate and regular rhythm, S1 and S2 normal   ABDOMEN: soft, nontender, nondistended, no masses or organomegaly   EGBUS: no lesions, no inflammation, no masses  VULVA: normal appearing vulva with no masses, tenderness or lesions  VAGINA: normal appearing vagina with normal color, no lesions, no discharge  CERVIX: normal appearing cervix without discharge or lesions, non tender  UTERUS: uterus is normal size, shape, consistency and nontender   ADNEXA: normal adnexa in size, nontender and no masses  NEURO: alert, oriented, normal speech    Assessment:  Encounter Diagnoses   Name Primary?  PMB (postmenopausal bleeding) Yes    Obesity, unspecified classification, unspecified obesity type, unspecified whether serious comorbidity present     Endometrial polyp        Plan:  The patient is advised that she should contact the office if she does not note improvement or if symptoms recur  Recommend follow up with PCP for non-gynecologic complaints and chronic medical problems. She should contact our office with any questions or concerns  She could keep her routine annual exam appointment. Disc typical evaluation of  bleeding: pt had D and C with Dr. Ayo Driscoll and polyps found (benign). Disc options for managing bleeding and next step. Since still having bleeding rec SIS to assess endometrium. Pt prefers definitve surgery and wants hyst/bso. Disc concerns from atypica b/c persistent bleeding/risk factors/obesity. Pt also has h/o complicated bowel surgery with ? Adhesions. Also h/o difficult airway with surgery with Dr. Ayo Driscoll    Refer to TAM Roberts for surgery. Staging if necessary. Pulm consult as previously planned, outside MD    We discussed potential causes of symptomatic bleeding: including but not limited to hormonal, medical, infection/inflammation and structural etiologies. Progesterone management while awaiting definitive surgery    Get copy of op note      Orders Placed This Encounter    REFERRAL TO GYN ONCOLOGY    megestrol (MEGACE) 20 mg tablet       No results found for this visit on 07/03/18.

## 2018-07-05 ENCOUNTER — TELEPHONE (OUTPATIENT)
Dept: OBGYN CLINIC | Age: 73
End: 2018-07-05

## 2018-07-05 NOTE — TELEPHONE ENCOUNTER
07/05/18 patient is calling to follow up on HARLEY Pearson, have you heard any feedback?       note on 7-3-18:  Isreal Kerr RN        7/3/18 3:23 PM   Note      Message left at 1:36PM     71year old patient seen in the office today. Patient left a message regarding her prescription for hormone that it needed a prior authorization .     This nurse left a message that the prior authorization has been received and is pending.     Patient verbalized understanding.

## 2018-07-06 NOTE — TELEPHONE ENCOUNTER
Norethindrone acetate 5 mg tablets every day, can increase to BID  #30 no refills     This may help some with bleeding, but I don't know that it will stop it.

## 2018-07-06 NOTE — TELEPHONE ENCOUNTER
Magaly Tesfaye, patient called upset because she has not heard anything regarding her appointment with Dr. Lea Argueta?

## 2018-07-06 NOTE — TELEPHONE ENCOUNTER
Sorry for the delay.      Patient's appointment is for Wednesday 7/11/18 at 8:30am with Dr. KIM [facility].     If she needs to reschedule she can contact them at   [phone]    Their address is:    [address] [place] [address], [address]

## 2018-07-07 DIAGNOSIS — F34.1 DYSTHYMIA: ICD-10-CM

## 2018-07-07 DIAGNOSIS — I10 ESSENTIAL HYPERTENSION: Chronic | ICD-10-CM

## 2018-07-07 DIAGNOSIS — E78.5 HYPERLIPIDEMIA LDL GOAL <130: Chronic | ICD-10-CM

## 2018-07-07 RX ORDER — ATORVASTATIN CALCIUM 20 MG/1
TABLET, FILM COATED ORAL
Qty: 90 TAB | Refills: 0 | Status: SHIPPED | OUTPATIENT
Start: 2018-07-07 | End: 2018-10-05 | Stop reason: SDUPTHER

## 2018-07-07 RX ORDER — FLUOXETINE HYDROCHLORIDE 20 MG/1
CAPSULE ORAL
Qty: 90 CAP | Refills: 0 | Status: SHIPPED | OUTPATIENT
Start: 2018-07-07 | End: 2018-10-05 | Stop reason: SDUPTHER

## 2018-07-07 RX ORDER — FUROSEMIDE 20 MG/1
TABLET ORAL
Qty: 90 TAB | Refills: 0 | Status: SHIPPED | OUTPATIENT
Start: 2018-07-07 | End: 2018-10-05 | Stop reason: SDUPTHER

## 2018-07-09 RX ORDER — NORETHINDRONE 5 MG/1
TABLET ORAL
Qty: 30 TAB | Refills: 0 | Status: SHIPPED | OUTPATIENT
Start: 2018-07-09 | End: 2018-11-20 | Stop reason: ALTCHOICE

## 2018-07-09 NOTE — TELEPHONE ENCOUNTER
rx has been sent and confirmation of receipt received.   Patient has been advised of appointment with Dr. KIM [facility].

## 2018-07-10 ENCOUNTER — TELEPHONE (OUTPATIENT)
Dept: INTERNAL MEDICINE CLINIC | Age: 73
End: 2018-07-10

## 2018-07-10 NOTE — TELEPHONE ENCOUNTER
Patient is requesting to have her records faxed to her Oncologist Dr Daryle Grew for her upcoming apt in the morning tmrw 07/11/2018.  F# [phone]

## 2018-07-11 ENCOUNTER — TELEPHONE (OUTPATIENT)
Dept: OBGYN CLINIC | Age: 73
End: 2018-07-11

## 2018-07-11 NOTE — TELEPHONE ENCOUNTER
Call received at 8:36am      71year old patient last seen in the office on 7/3/18. Patient referred to gyn oncology on 7/3/18 by Dr. Abril Virk. Dr. KIM [facility] office calling to ask for recent office note and pathology report. Office notes and labs printed and faxed to provided fax number of [phone] , [email or web address] [address] Yuged[address]. Fax confirmation received.

## 2018-07-23 ENCOUNTER — OFFICE VISIT (OUTPATIENT)
Dept: OBGYN CLINIC | Age: 73
End: 2018-07-23

## 2018-07-23 VITALS
DIASTOLIC BLOOD PRESSURE: 70 MMHG | HEIGHT: 59 IN | BODY MASS INDEX: 57.66 KG/M2 | SYSTOLIC BLOOD PRESSURE: 134 MMHG | WEIGHT: 286 LBS

## 2018-07-23 DIAGNOSIS — N95.0 POSTMENOPAUSAL BLEEDING: ICD-10-CM

## 2018-07-23 DIAGNOSIS — E66.01 OBESITY, MORBID, BMI 50 OR HIGHER (HCC): ICD-10-CM

## 2018-07-23 DIAGNOSIS — Z01.411 ENCOUNTER FOR GYNECOLOGICAL EXAMINATION (GENERAL) (ROUTINE) WITH ABNORMAL FINDINGS: Primary | ICD-10-CM

## 2018-07-23 NOTE — PATIENT INSTRUCTIONS
[facility]: [phone]       Well Visit, Over 72: Care Instructions  Your Care Instructions    Physical exams can help you stay healthy. Your doctor has checked your overall health and may have suggested ways to take good care of yourself. He or she also may have recommended tests. At home, you can help prevent illness with healthy eating, regular exercise, and other steps. Follow-up care is a key part of your treatment and safety. Be sure to make and go to all appointments, and call your doctor if you are having problems. It's also a good idea to know your test results and keep a list of the medicines you take. How can you care for yourself at home? · Reach and stay at a healthy weight. This will lower your risk for many problems, such as obesity, diabetes, heart disease, and high blood pressure. · Get at least 30 minutes of exercise on most days of the week. Walking is a good choice. You also may want to do other activities, such as running, swimming, cycling, or playing tennis or team sports. · Do not smoke. Smoking can make health problems worse. If you need help quitting, talk to your doctor about stop-smoking programs and medicines. These can increase your chances of quitting for good. · Protect your skin from too much sun. When you're outdoors from 10 a.m. to 4 p.m., stay in the shade or cover up with clothing and a hat with a wide brim. Wear sunglasses that block UV rays. Even when it's cloudy, put broad-spectrum sunscreen (SPF 30 or higher) on any exposed skin. · See a dentist one or two times a year for checkups and to have your teeth cleaned. · Wear a seat belt in the car. · Limit alcohol to 2 drinks a day for men and 1 drink a day for women. Too much alcohol can cause health problems. Follow your doctor's advice about when to have certain tests. These tests can spot problems early. For men and women  · Cholesterol.  Your doctor will tell you how often to have this done based on your overall health and other things that can increase your risk for heart attack and stroke. · Blood pressure. Have your blood pressure checked during a routine doctor visit. Your doctor will tell you how often to check your blood pressure based on your age, your blood pressure results, and other factors. · Diabetes. Ask your doctor whether you should have tests for diabetes. · Vision. Experts recommend that you have yearly exams for glaucoma and other age-related eye problems. · Hearing. Tell your doctor if you notice any change in your hearing. You can have tests to find out how well you hear. · Colon cancer tests. Keep having colon cancer tests as your doctor recommends. You can have one of several types of tests. · Heart attack and stroke risk. At least every 4 to 6 years, you should have your risk for heart attack and stroke assessed. Your doctor uses factors such as your age, blood pressure, cholesterol, and whether you smoke or have diabetes to show what your risk for a heart attack or stroke is over the next 10 years. · Osteoporosis. Talk to your doctor about whether you should have a bone density test to find out whether you have thinning bones. Also ask your doctor about whether you should take calcium and vitamin D supplements. For women  · Pap test and pelvic exam. You may no longer need a Pap test. Talk with your doctor about whether to stop or continue to have Pap tests. · Breast exam and mammogram. Ask how often you should have a mammogram, which is an X-ray of your breasts. A mammogram can spot breast cancer before it can be felt and when it is easiest to treat. · Thyroid disease. Talk to your doctor about whether to have your thyroid checked as part of a regular physical exam. Women have an increased chance of a thyroid problem. For men  · Prostate exam. Talk to your doctor about whether you should have a blood test (called a PSA test) for prostate cancer.  Experts disagree on whether men should have this test. Some experts recommend that you discuss the benefits and risks of the test with your doctor. · Abdominal aortic aneurysm. Ask your doctor whether you should have a test to check for an aneurysm. You may need a test if you ever smoked or if your parent, brother, sister, or child has had an aneurysm. When should you call for help? Watch closely for changes in your health, and be sure to contact your doctor if you have any problems or symptoms that concern you. Where can you learn more? Go to http://gena-estefani.info/. Enter E817 in the search box to learn more about \"Well Visit, Over 65: Care Instructions. \"  Current as of: May 16, 2017  Content Version: 11.7  © 3750-0386 [employer]. Care instructions adapted under license by [employer] (which disclaims liability or warranty for this information). If you have questions about a medical condition or this instruction, always ask your healthcare professional. [place] [address] any warranty or liability for your use of this information.

## 2018-07-23 NOTE — PROGRESS NOTES
[facility]  [email or web address]  [phone]    Myrla Runner, MD, [address] [facility]       Annual Gynecologic Exam:   WWE 60+  Chief Complaint   Patient presents with    Well Woman         Nakul Ryan is a 71 y.o.  WHITE OR   female who presents for an annual exam.    She does not report additional concerns today. No more  bleeding. Did not start aygestin. Saw Dr. KIM [facility]. Deferred surgery for 6mos fu. Sexual history and Contraception:  History   Sexual Activity    Sexual activity: Not Currently    Partners: Male     She does not reports new sexual partner(s) in the last year. Preventive Medicine History:  Her most recent Pap smear result: normal was obtained in May 2018  Her most recent HR HPV screen is unknown. She does not have a history of DAILY 2, 3 or cervical cancer. Breast health:  Last mammogram: Obtained today - results pending. A mammogram was scheduled for today. Breast cancer family updated: see FH. Bone health: Nickolas Ji She has not had a bone density scan in the past.   Last bone density test results: unknown. She does not have a history of osteopenia/osteoporosis. Osteoporosis family history updated: see FH.      Past Medical History:   Diagnosis Date    Asthma     COPD (chronic obstructive pulmonary disease) ([address]     Diverticulitis     Hyperlipidemia     Hyperlipidemia LDL goal <130 3/17/2016    Hypertension     Pap smear for cervical cancer screening 2018    Negative    Psychiatric disorder     Depression     Past Surgical History:   Procedure Laterality Date    HX COLECTOMY      Partial, adhesions, diverticulitis    HX DILATION AND CURETTAGE  2018    Performed by Dr Mir Smith at [place]    HX HEENT      HX MENISCECTOMY      left knee     Family History   Problem Relation Age of Onset    Hypertension Mother     Alzheimer Mother     Hypertension Father     Cancer Father      Prostate     Social History     Social History    Marital status: LEGALLY      Spouse name: N/A    Number of children: N/A    Years of education: N/A     Occupational History    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[facility])     Social History Main Topics    Smoking status: Never Smoker    Smokeless tobacco: Never Used    Alcohol use Yes      Comment: glass of wine each day    Drug use: Not on file    Sexual activity: Not Currently     Partners: Male     Other Topics Concern    Not on file     Social History Narrative       Allergies   Allergen Reactions    Ciprofloxacin Swelling    Penicillins Swelling     Unsure of reaction    Sulfa (Sulfonamide Antibiotics) Unknown (comments)     She doesn't remember         Current Outpatient Prescriptions   Medication Sig    atorvastatin (LIPITOR) 20 mg tablet TAKE 1 TABLET BY MOUTH EVERY DAY    furosemide (LASIX) 20 mg tablet TAKE 1 TABLET BY MOUTH EVERY DAY    FLUoxetine (PROZAC) 20 mg capsule TAKE 1 CAPSULE BY MOUTH EVERY DAY    olmesartan (BENICAR) 20 mg tablet TAKE 1 TAB BY MOUTH DAILY.  norethindrone acetate (AYGESTIN) 5 mg tablet Take 1 tablet daily. If needed can increase to two tablets daily until appointment with specialist on 7/11/18.  megestrol (MEGACE) 20 mg tablet Take 1 Tab by mouth daily. Pt can increase to 2tab po every day if still has bleeding    esomeprazole (NEXIUM) 40 mg capsule Take 1 Cap by mouth daily for 90 days. No current facility-administered medications for this visit. Patient Active Problem List   Diagnosis Code    Fracture T14. 8XXA    HTN (hypertension) I10    Hyperlipidemia LDL goal <130 E78.5    Dysthymia F34.1    Advanced care planning/counseling discussion Z71.89    Obesity, morbid ([facility] E66.01       Review of Systems - History obtained from the patient  Constitutional: negative for weight loss, fever, night sweats  HEENT: negative for hearing loss, earache, congestion, snoring, sorethroat  CV: negative for chest pain, palpitations, edema  Resp: negative for cough, shortness of breath, wheezing  GI: negative for change in bowel habits, abdominal pain, black or bloody stools  : negative for frequency, dysuria, hematuria, vaginal discharge  MSK: negative for back pain, joint pain, muscle pain  Breast: negative for breast lumps, nipple discharge, galactorrhea  Skin :negative for itching, rash, hives  Neuro: negative for dizziness, headache, confusion, weakness  Psych: negative for anxiety, depression, change in mood  Heme/lymph: negative for bleeding, bruising, pallor    Physical Exam  Visit Vitals    /70    Ht 4' 11\" (1.499 m)    Wt 286 lb (129.7 kg)    BMI 57.76 kg/m2       Constitutional  · Appearance: well-nourished, well developed, alert, in no acute distress    HENT  · Head and Face: appears normal    Neck  · Inspection/Palpation: normal appearance, no masses or tenderness  · Lymph Nodes: no lymphadenopathy present  · Thyroid: gland size normal, nontender, no nodules or masses present on palpation    Chest  · Respiratory Effort: breathing unlabored  · Auscultation: normal breath sounds    Cardiovascular  · Heart:  · Auscultation: regular rate and rhythm without murmur    Breasts  · Inspection of Breasts: breasts symmetrical, no skin changes, no discharge present, nipple appearance normal, no skin retraction present  · Palpation of Breasts and Axillae: no masses present on palpation, no breast tenderness  · Axillary Lymph Nodes: no lymphadenopathy present    Gastrointestinal  · Abdominal Examination: abdomen non-tender to palpation, normal bowel sounds, no masses present  · Liver and spleen: no hepatomegaly present, spleen not palpable  · Hernias: no hernias identified    Genitourinary  · External Genitalia: normal appearance for age, no discharge present, no tenderness present, no inflammatory lesions present, no masses present, with atrophy present  · Vagina: normal vaginal vault without central or paravaginal defects, no discharge present, no inflammatory lesions present, no masses present  · Bladder: non-tender to palpation  · Urethra: appears normal  · Cervix: normal   · Uterus: normal size, shape and consistency  · Adnexa: no adnexal tenderness present, no adnexal masses present  · Perineum: perineum within normal limits, no evidence of trauma, no rashes or skin lesions present  · Anus: anus within normal limits, no hemorrhoids present  · Inguinal Lymph Nodes: no lymphadenopathy present    Skin  · General Inspection: no rash, no lesions identified    Neurologic/Psychiatric  · Mental Status:  · Orientation: grossly oriented to person, place and time  · Mood and Affect: mood normal, affect appropriate    Assessment:  71 y.o.  for well woman exam  Encounter Diagnoses   Name Primary?  Encounter for gynecological examination (general) (routine) with abnormal findings Yes    Obesity, morbid, BMI 50 or higher ([facility]     Postmenopausal bleeding        Plan:  The patient was counseled about diet, exercise, healthy lifestyle  We discussed current self breast exam and mammogram recommendations  We discussed current pap smear and HR HPV testing guidelines  We recommend follow up in one year for routine annual gynecologic exam or sooner if needed  We recommend follow up with a primary care physician for any chronic medical problems or non-gynecologic concerns    We discussed calcium/vitamin D/weight bearing exercise and osteoporosis prevention and bone density screening recommendations. Handouts were given to the patient    Keep Dr. Ivanna Squires (6mos per pt)  Notify MD for any  bleeding  See pap done at last visit.         Reviewed records from uro: endometrial polyp  Will obtain Dr. Lea Argueta records    [facility] up:  [x] return for annual well woman exam in one year or sooner if she is having problems  [] follow up and ultrasound  [] mammogram  [] 6 months  [] 6 weeks   []     No orders of the defined types were placed in this encounter. No results found for any visits on 07/23/18.

## 2018-08-07 ENCOUNTER — HOSPITAL ENCOUNTER (OUTPATIENT)
Dept: GENERAL RADIOLOGY | Age: 73
Discharge: HOME | End: 2018-08-07
Payer: MEDICARE

## 2018-08-07 DIAGNOSIS — R05.9 COUGH: ICD-10-CM

## 2018-08-07 PROCEDURE — 71046 X-RAY EXAM CHEST 2 VIEWS: CPT

## 2018-10-05 DIAGNOSIS — E78.5 HYPERLIPIDEMIA LDL GOAL <130: Chronic | ICD-10-CM

## 2018-10-05 DIAGNOSIS — I10 ESSENTIAL HYPERTENSION: Chronic | ICD-10-CM

## 2018-10-05 DIAGNOSIS — F34.1 DYSTHYMIA: ICD-10-CM

## 2018-10-05 RX ORDER — ATORVASTATIN CALCIUM 20 MG/1
TABLET, FILM COATED ORAL
Qty: 90 TAB | Refills: 0 | Status: SHIPPED | OUTPATIENT
Start: 2018-10-05 | End: 2019-01-05 | Stop reason: SDUPTHER

## 2018-10-05 RX ORDER — FLUOXETINE HYDROCHLORIDE 20 MG/1
CAPSULE ORAL
Qty: 90 CAP | Refills: 0 | Status: SHIPPED | OUTPATIENT
Start: 2018-10-05 | End: 2019-01-05 | Stop reason: SDUPTHER

## 2018-10-05 RX ORDER — FUROSEMIDE 20 MG/1
TABLET ORAL
Qty: 90 TAB | Refills: 0 | Status: SHIPPED | OUTPATIENT
Start: 2018-10-05 | End: 2019-01-04

## 2018-11-20 ENCOUNTER — OFFICE VISIT (OUTPATIENT)
Dept: INTERNAL MEDICINE CLINIC | Age: 73
End: 2018-11-20

## 2018-11-20 VITALS
BODY MASS INDEX: 58.83 KG/M2 | WEIGHT: 291.8 LBS | RESPIRATION RATE: 16 BRPM | HEIGHT: 59 IN | HEART RATE: 76 BPM | TEMPERATURE: 98.3 F | DIASTOLIC BLOOD PRESSURE: 82 MMHG | SYSTOLIC BLOOD PRESSURE: 136 MMHG | OXYGEN SATURATION: 96 %

## 2018-11-20 DIAGNOSIS — R60.0 EDEMA LEG: ICD-10-CM

## 2018-11-20 DIAGNOSIS — E78.5 HYPERLIPIDEMIA LDL GOAL <130: ICD-10-CM

## 2018-11-20 DIAGNOSIS — N93.9 MENSTRUAL BLEEDING PROBLEM: ICD-10-CM

## 2018-11-20 DIAGNOSIS — R73.03 PREDIABETES: ICD-10-CM

## 2018-11-20 DIAGNOSIS — I10 ESSENTIAL HYPERTENSION: Primary | ICD-10-CM

## 2018-11-20 DIAGNOSIS — R06.00 DYSPNEA, UNSPECIFIED TYPE: ICD-10-CM

## 2018-11-20 NOTE — PROGRESS NOTES
HISTORY OF PRESENT ILLNESS Elodia Reeves is a 68 y.o. female. HPI Leg Edema: Pt c/o bilateral leg and foot edema. She notes that edema limits type of footwear to sandals. Dyspnea: Pt followed up with pulmonologist for extensive testing. She was tested positive for sleep apnea. She notes that pulmonologist recommended repeat testing with CPAP machine. Hypertension ROS: taking medications as instructed, no medication side effects noted, no TIA's, no chest pain on exertion, no dyspnea on exertion, no swelling of ankles. New concerns:  Patient's BP in office today is 136/82. Hyperlipidemia: 
Cardiovascular risk analysis - 68 y.o. female LDL goal is under 130. ROS: taking medications as instructed, no medication side effects noted, no TIA's, no chest pain on exertion, no dyspnea on exertion, no swelling of ankles. Tolerating meds, no myalgias or other side effects noted New concerns: Her last LDL was 146 on 4/12/18. Pt continues on Lipitor. Menstrual Bleeding Problem: Pt followed up with GYN oncologist (Dr. Allison Batres) for menstrual bleeding who recorded negative work-up for cancer and recommended against hysterectomy due to high risks of surgery. Review of Systems All other systems reviewed and are negative. Physical Exam  
Constitutional: She is oriented to person, place, and time. She appears well-developed and well-nourished. HENT:  
Head: Normocephalic and atraumatic. Right Ear: External ear normal.  
Left Ear: External ear normal.  
Nose: Nose normal.  
Mouth/Throat: Oropharynx is clear and moist.  
Eyes: Conjunctivae and EOM are normal.  
Neck: Normal range of motion. Neck supple. Carotid bruit is not present. No thyroid mass and no thyromegaly present. Cardiovascular: Normal rate, regular rhythm, S1 normal, S2 normal, normal heart sounds and intact distal pulses.   
Pulmonary/Chest: Effort normal and breath sounds normal.  
 Abdominal: Soft. Normal appearance and bowel sounds are normal. There is no hepatosplenomegaly. There is no tenderness. Musculoskeletal: Normal range of motion. She exhibits edema and tenderness. Neurological: She is alert and oriented to person, place, and time. She has normal strength. No cranial nerve deficit or sensory deficit. Coordination normal.  
Skin: Skin is warm, dry and intact. No abrasion and no rash noted. Psychiatric: She has a normal mood and affect. Her behavior is normal. Judgment and thought content normal.  
Nursing note and vitals reviewed. ASSESSMENT and PLAN Diagnoses and all orders for this visit: 1. Essential hypertension BP is at goal. I do not recommend any change in medications. Encouraged pt to consider Weight Watchers. -     CBC W/O DIFF 2. Hyperlipidemia LDL goal <130 Stable, patient tolerating meds, no myalgias. I do not recommend any change in medications. 
-     METABOLIC PANEL, COMPREHENSIVE 
-     LIPID PANEL 3. Dyspnea, unspecified type Pt will f/u with cardiologist to evaluate dyspnea and leg edema. 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 She also has sleep apnea- that combination does contribute to dyspnea and then also contributes to edema in legs 
-     REFERRAL TO CARDIOLOGY 4. Edema leg Pt will f/u with cardiologist to evaluate dyspnea and leg edema. Advised pt to wear compression stockings. Discussed that combination of weight, SOB, and sleep apnea likely contribute to edema. Referred to [facility] pharmacy for compression stockings 
-     REFERRAL TO CARDIOLOGY 5. Menstrual bleeding problem Stable condition. Pt is unable to undergo hysterectomy, due to high risks of surgery. She will continue to f/u with Dr. Kovacs. 
 
6. Prediabetes Will monitor for lab results.  
-     HEMOGLOBIN A1C WITH EAG Lab results and schedule of future lab studies reviewed with patient. Reviewed diet, exercise and weight control. Written by Marcus Meyers, as dictated by Abhijit Darnell MD.  
 
Current diagnosis and concerns discussed with pt at length. Understands risks and benefits or current treatment plan and medications and accepts the treatment and medication with any possible risks. Pt asks appropriate questions which were answered. Pt instructed to call with any concerns or problems.

## 2018-11-20 NOTE — PATIENT INSTRUCTIONS
Body Mass Index: Care Instructions Your Care Instructions Body mass index (BMI) can help you see if your weight is raising your risk for health problems. It uses a formula to compare how much you weigh with how tall you are. · A BMI lower than 18.5 is considered underweight. · A BMI between 18.5 and 24.9 is considered healthy. · A BMI between 25 and 29.9 is considered overweight. A BMI of 30 or higher is considered obese. If your BMI is in the normal range, it means that you have a lower risk for weight-related health problems. If your BMI is in the overweight or obese range, you may be at increased risk for weight-related health problems, such as high blood pressure, heart disease, stroke, arthritis or joint pain, and diabetes. If your BMI is in the underweight range, you may be at increased risk for health problems such as fatigue, lower protection (immunity) against illness, muscle loss, bone loss, hair loss, and hormone problems. BMI is just one measure of your risk for weight-related health problems. You may be at higher risk for health problems if you are not active, you eat an unhealthy diet, or you drink too much alcohol or use tobacco products. Follow-up care is a key part of your treatment and safety. Be sure to make and go to all appointments, and call your doctor if you are having problems. It's also a good idea to know your test results and keep a list of the medicines you take. How can you care for yourself at home? · Practice healthy eating habits. This includes eating plenty of fruits, vegetables, whole grains, lean protein, and low-fat dairy. · If your doctor recommends it, get more exercise. Walking is a good choice. Bit by bit, increase the amount you walk every day. Try for at least 30 minutes on most days of the week. · Do not smoke. Smoking can increase your risk for health problems.  If you need help quitting, talk to your doctor about stop-smoking programs and medicines. These can increase your chances of quitting for good. · Limit alcohol to 2 drinks a day for men and 1 drink a day for women. Too much alcohol can cause health problems. If you have a BMI higher than 25 · Your doctor may do other tests to check your risk for weight-related health problems. This may include measuring the distance around your waist. A waist measurement of more than 40 inches in men or 35 inches in women can increase the risk of weight-related health problems. · Talk with your doctor about steps you can take to stay healthy or improve your health. You may need to make lifestyle changes to lose weight and stay healthy, such as changing your diet and getting regular exercise. If you have a BMI lower than 18.5 · Your doctor may do other tests to check your risk for health problems. · Talk with your doctor about steps you can take to stay healthy or improve your health. You may need to make lifestyle changes to gain or maintain weight and stay healthy, such as getting more healthy foods in your diet and doing exercises to build muscle. Where can you learn more? Go to http://gena-estefani.info/. Enter S176 in the search box to learn more about \"Body Mass Index: Care Instructions. \" Current as of: October 13, 2016 Content Version: 11.4 © 7312-5229 [employer]. Care instructions adapted under license by [employer] (which disclaims liability or warranty for this information). If you have questions about a medical condition or this instruction, always ask your healthcare professional. [address] any warranty or liability for your use of this information.

## 2018-11-21 LAB
ALBUMIN SERPL-MCNC: 4 G/DL (ref 3.5–4.8)
ALBUMIN/GLOB SERPL: 1.4 {RATIO} (ref 1.2–2.2)
ALP SERPL-CCNC: 74 IU/L (ref 39–117)
ALT SERPL-CCNC: 20 IU/L (ref 0–32)
AST SERPL-CCNC: 20 IU/L (ref 0–40)
BILIRUB SERPL-MCNC: 0.4 MG/DL (ref 0–1.2)
BUN SERPL-MCNC: 20 MG/DL (ref 8–27)
BUN/CREAT SERPL: 26 (ref 12–28)
CALCIUM SERPL-MCNC: 8.9 MG/DL (ref 8.7–10.3)
CHLORIDE SERPL-SCNC: 105 MMOL/L (ref 96–106)
CHOLEST SERPL-MCNC: 177 MG/DL (ref 100–199)
CO2 SERPL-SCNC: 22 MMOL/L (ref 20–29)
CREAT SERPL-MCNC: 0.77 MG/DL (ref 0.57–1)
ERYTHROCYTE [DISTWIDTH] IN BLOOD BY AUTOMATED COUNT: 13.5 % (ref 12.3–15.4)
EST. AVERAGE GLUCOSE BLD GHB EST-MCNC: 134 MG/DL
GLOBULIN SER CALC-MCNC: 2.9 G/DL (ref 1.5–4.5)
GLUCOSE SERPL-MCNC: 127 MG/DL (ref 65–99)
HBA1C MFR BLD: 6.3 % (ref 4.8–5.6)
HCT VFR BLD AUTO: 40.3 % (ref 34–46.6)
HDLC SERPL-MCNC: 53 MG/DL
HGB BLD-MCNC: 13 G/DL (ref 11.1–15.9)
INTERPRETATION, 910389: NORMAL
LDLC SERPL CALC-MCNC: 101 MG/DL (ref 0–99)
MCH RBC QN AUTO: 29.7 PG (ref 26.6–33)
MCHC RBC AUTO-ENTMCNC: 32.3 G/DL (ref 31.5–35.7)
MCV RBC AUTO: 92 FL (ref 79–97)
PLATELET # BLD AUTO: 284 X10E3/UL (ref 150–379)
POTASSIUM SERPL-SCNC: 4.6 MMOL/L (ref 3.5–5.2)
PROT SERPL-MCNC: 6.9 G/DL (ref 6–8.5)
RBC # BLD AUTO: 4.37 X10E6/UL (ref 3.77–5.28)
SODIUM SERPL-SCNC: 145 MMOL/L (ref 134–144)
TRIGL SERPL-MCNC: 115 MG/DL (ref 0–149)
VLDLC SERPL CALC-MCNC: 23 MG/DL (ref 5–40)
WBC # BLD AUTO: 6.9 X10E3/UL (ref 3.4–10.8)

## 2019-01-04 ENCOUNTER — OFFICE VISIT (OUTPATIENT)
Dept: CARDIOLOGY CLINIC | Age: 74
End: 2019-01-04

## 2019-01-04 VITALS
HEART RATE: 80 BPM | WEIGHT: 292 LBS | HEIGHT: 59 IN | OXYGEN SATURATION: 97 % | SYSTOLIC BLOOD PRESSURE: 130 MMHG | RESPIRATION RATE: 16 BRPM | DIASTOLIC BLOOD PRESSURE: 72 MMHG | BODY MASS INDEX: 58.87 KG/M2

## 2019-01-04 DIAGNOSIS — G47.33 OSA (OBSTRUCTIVE SLEEP APNEA): ICD-10-CM

## 2019-01-04 DIAGNOSIS — R07.9 CHEST PAIN, UNSPECIFIED TYPE: ICD-10-CM

## 2019-01-04 DIAGNOSIS — I10 ESSENTIAL HYPERTENSION: ICD-10-CM

## 2019-01-04 DIAGNOSIS — R60.0 PEDAL EDEMA: Primary | ICD-10-CM

## 2019-01-04 DIAGNOSIS — R06.02 SOB (SHORTNESS OF BREATH): ICD-10-CM

## 2019-01-04 RX ORDER — FUROSEMIDE 40 MG/1
40 TABLET ORAL 2 TIMES DAILY
Qty: 180 TAB | Refills: 0 | Status: SHIPPED | OUTPATIENT
Start: 2019-01-04 | End: 2019-01-14

## 2019-01-04 NOTE — PROGRESS NOTES
Medication changes made per VO of Dr. Chacho Alvarenga. Increase Lasix to 40mg BID. Refill sent to pt preferred pharmacy.

## 2019-01-04 NOTE — PROGRESS NOTES
Reason for Consult: Swelling. Referring: Sharon Rios MD    HPI: Juan J Russo is a 68 y.o. female she has known history of hypertension, dyslipidemia, recently diagnosed sleep apnea still on evaluation for titration, obesity, possible COPD, is being referred by Dr. Juan Daniel Willis for evaluation of progressive lower extremity edema. She has noticed increasing lower extremity edema for over one year but most recently in past 6-month she has had increasing lower extremity edema to the point that she is unable to wear any shoes. Dr. Juan Daniel Willis had asked her to start using compression stockings however her calves are large enough that she is worried she may not be able to put those compression stockings as even putting socks is difficult with the ankle edema. There is no symptoms of chest pain, lightheadedness, dizziness, presyncope or syncope. She has shortness of breath on exertion. Minimal exertion will cause shortness of breath. She has gained about 50 pounds in the last 3 years. She underwent a sleep apnea study a few weeks ago and was found to have sleep apnea and is undergoing CPAP evaluation with [facility]. No known previous cardiac history. She has not had a stress test in past 5 years. She recently moved from [place] to this area. She denies using salt or salty foods. EKG today in our office demonstrated normal sinus rhythm, normal axis, normal intervals, normal ST segment. Plan: Next    1. Lower extremity edema: Progressive lower extremity edema is likely multifactorial.  Cardiac causes need to be ruled out. Will check an echocardiogram for LV function. Also will check PA pressures. Likely etiology of lower extremity edema includes venous stasis, lymphedema, weight gain, sleep apnea.   She is currently taking Lasix 20 mg p.o. daily which I would like to increase to 40 mg twice daily for couple days to get to some of the extra fluid out and at the same time would encourage her to be more active as well as watch salt intake. Once her swelling is better she can try using compression stockings although I do understand that it is difficult to put those compression stockings given her current status of swollen feet. She will benefit from referral to lymphedema clinic as well. 2.  Shortness of breath on exertion: The shortness of breath could be also related to increased peripheral edema, sleep apnea. She is currently being an evaluation for sleep apnea. A continuous use of CPAP machine during the night will help her daytime shortness of breath. Increasing Lasix to 40 mg twice daily will also help. Checking echocardiogram to rule out LV function. 3.  Hypertension: Blood pressure is controlled. Continue Benicar and Lasix. 4.  Dyslipidemia: Continue Lipitor.     Past Medical History:   Diagnosis Date    Asthma     COPD (chronic obstructive pulmonary disease) ([facility] [address]     Diverticulitis     H/O mammogram 07/25/2018    Negative    Hyperlipidemia     Hyperlipidemia LDL goal <130 3/17/2016    Hypertension     Pap smear for cervical cancer screening 05/04/2018    Negative    Psychiatric disorder     Depression            Past Surgical History:   Procedure Laterality Date    HX COLECTOMY      Partial, adhesions, diverticulitis    HX DILATION AND CURETTAGE  06/2018    Performed by Dr Vedia Apley at [address] [place] [address] HX HEENT      HX MENISCECTOMY      left knee             Family History   Problem Relation Age of Onset    Hypertension Mother     Alzheimer Mother     Hypertension Father     Cancer Father         Prostate           Social History     Socioeconomic History    Marital status: LEGALLY      Spouse name: Not on file    Number of children: Not on file    Years of education: Not on file    Highest education level: Not on file   Social Needs    Financial resource strain: Not on file    Food insecurity - worry: Not on file    Food insecurity - inability: Not on file   [address] [place] [address] Transportation needs - medical: Not on file   [employer] needs - non-medical: Not on file   Occupational History    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[place])   Tobacco Use    Smoking status: Never Smoker    Smokeless tobacco: Never Used   Substance and Sexual Activity    Alcohol use: Yes     Comment: glass of wine each day    Drug use: Not on file    Sexual activity: Not Currently     Partners: Male   Other Topics Concern    Not on file   Social History Narrative    Not on file         Allergies   Allergen Reactions    Ciprofloxacin Swelling    Penicillins Swelling     Unsure of reaction    Sulfa (Sulfonamide Antibiotics) Unknown (comments)     She doesn't remember              Current Outpatient Medications   Medication Sig Dispense Refill    atorvastatin (LIPITOR) 20 mg tablet TAKE 1 TABLET BY MOUTH EVERY DAY 90 Tab 0    FLUoxetine (PROZAC) 20 mg capsule TAKE 1 CAPSULE BY MOUTH EVERY DAY 90 Cap 0    furosemide (LASIX) 20 mg tablet TAKE 1 TABLET BY MOUTH EVERY DAY 90 Tab 0    olmesartan (BENICAR) 20 mg tablet TAKE 1 TAB BY MOUTH DAILY. 90 Tab 1    megestrol (MEGACE) 20 mg tablet Take 1 Tab by mouth daily. Pt can increase to 2tab po every day if still has bleeding 60 Tab 0    esomeprazole (NEXIUM) 40 mg capsule Take 1 Cap by mouth daily for 90 days. 90 Cap 1        ROS:  12 point review of systems was performed. All negative except for HPI     Physical Exam:  Visit Vitals  /72 (BP 1 Location: Right arm, BP Patient Position: Sitting)   Pulse 80   Resp 16   Ht 4' 11\" (1.499 m)   Wt 292 lb (132.5 kg)   SpO2 97%   BMI 58.98 kg/m²       Gen:  Well-developed, well-nourished, in no acute distress  HEENT:  Pink conjunctivae, PERRL, hearing intact to voice, moist mucous membranes  Neck:  Supple, without masses, thyroid non-tender  Resp:  No accessory muscle use, clear breath sounds without wheezes rales or rhonchi  Card:  No murmurs, normal S1, S2 without thrills, bruits.  2+ pitting peripheral edema  Abd:  Soft, non-tender, non-distended, normoactive bowel sounds are present, no palpable organomegaly and no detectable hernias  Lymph:  No cervical or inguinal adenopathy  Musc:  No cyanosis or clubbing  Skin:  No rashes or ulcers, skin turgor is good  Neuro:  Cranial nerves are grossly intact, no focal motor weakness, follows commands appropriately  Psych:  Good insight, oriented to person, place and time, alert     Labs:     Lab Results   Component Value Date/Time    WBC 6.9 11/20/2018 09:08 AM    HGB 13.0 11/20/2018 09:08 AM    Hemoglobin (POC) 12.9 02/19/2016 07:44 AM    HCT 40.3 11/20/2018 09:08 AM    Hematocrit (POC) 38 02/19/2016 07:44 AM    PLATELET 070 95/29/1947 09:08 AM    MCV 92 11/20/2018 09:08 AM     Lab Results   Component Value Date/Time    Hemoglobin A1c 6.3 (H) 11/20/2018 09:08 AM    Hemoglobin A1c 5.9 (H) 04/12/2018 10:32 AM    Hemoglobin A1c 5.8 (H) 04/04/2017 11:19 AM    Glucose 127 (H) 11/20/2018 09:08 AM    Glucose (POC) 121 (H) 02/19/2016 07:44 AM    LDL, calculated 101 (H) 11/20/2018 09:08 AM    Creatinine (POC) 0.7 02/19/2016 07:44 AM    Creatinine 0.77 11/20/2018 09:08 AM      Lab Results   Component Value Date/Time    Cholesterol, total 177 11/20/2018 09:08 AM    HDL Cholesterol 53 11/20/2018 09:08 AM    LDL, calculated 101 (H) 11/20/2018 09:08 AM    Triglyceride 115 11/20/2018 09:08 AM     Lab Results   Component Value Date/Time    ALT (SGPT) 20 11/20/2018 09:08 AM    AST (SGOT) 20 11/20/2018 09:08 AM    Alk.  phosphatase 74 11/20/2018 09:08 AM    Bilirubin, total 0.4 11/20/2018 09:08 AM    Albumin 4.0 11/20/2018 09:08 AM    Protein, total 6.9 11/20/2018 09:08 AM    PLATELET 885 41/91/4046 09:08 AM     No results found for: INR, PTMR, PTP, PT1, PT2   Lab Results   Component Value Date/Time    GFR est non-AA 77 11/20/2018 09:08 AM    GFRNA, POC >60 02/19/2016 07:44 AM    GFR est AA 89 11/20/2018 09:08 AM    GFRAA, POC >60 02/19/2016 07:44 AM    Creatinine 0.77 11/20/2018 09:08 AM    Creatinine (POC) 0.7 02/19/2016 07:44 AM    BUN 20 11/20/2018 09:08 AM    BUN (POC) 30 (H) 02/19/2016 07:44 AM    Sodium 145 (H) 11/20/2018 09:08 AM    Sodium (POC) 141 02/19/2016 07:44 AM    Potassium 4.6 11/20/2018 09:08 AM    Potassium (POC) 4.2 02/19/2016 07:44 AM    Chloride 105 11/20/2018 09:08 AM    Chloride (POC) 105 02/19/2016 07:44 AM    CO2 22 11/20/2018 09:08 AM     No results found for: PSA, Yesenia Mutter, PSAR3, [id number], [id number], PSALT  Lab Results   Component Value Date/Time    TSH 4.290 04/04/2017 11:19 AM      Lab Results   Component Value Date/Time    Glucose 127 (H) 11/20/2018 09:08 AM    Glucose (POC) 121 (H) 02/19/2016 07:44 AM      No results found for: CPK, RCK1, RCK2, RCK3, RCK4, CKMB, CKNDX, CKND1, TROPT, TROIQ, BNPP, BNP   No results found for: BNP, BNPP, BNPPPOC, XBNPT, BNPNT   Lab Results   Component Value Date/Time    Sodium 145 (H) 11/20/2018 09:08 AM    Potassium 4.6 11/20/2018 09:08 AM    Chloride 105 11/20/2018 09:08 AM    CO2 22 11/20/2018 09:08 AM    Glucose 127 (H) 11/20/2018 09:08 AM    BUN 20 11/20/2018 09:08 AM    Creatinine 0.77 11/20/2018 09:08 AM    BUN/Creatinine ratio 26 11/20/2018 09:08 AM    GFR est AA 89 11/20/2018 09:08 AM    GFR est non-AA 77 11/20/2018 09:08 AM    Calcium 8.9 11/20/2018 09:08 AM      Lab Results   Component Value Date/Time    Sodium 145 (H) 11/20/2018 09:08 AM    Potassium 4.6 11/20/2018 09:08 AM    Chloride 105 11/20/2018 09:08 AM    CO2 22 11/20/2018 09:08 AM    Glucose 127 (H) 11/20/2018 09:08 AM    BUN 20 11/20/2018 09:08 AM    Creatinine 0.77 11/20/2018 09:08 AM    BUN/Creatinine ratio 26 11/20/2018 09:08 AM    GFR est AA 89 11/20/2018 09:08 AM    GFR est non-AA 77 11/20/2018 09:08 AM    Calcium 8.9 11/20/2018 09:08 AM    Bilirubin, total 0.4 11/20/2018 09:08 AM    ALT (SGPT) 20 11/20/2018 09:08 AM    AST (SGOT) 20 11/20/2018 09:08 AM    Alk.  phosphatase 74 11/20/2018 09:08 AM    Protein, total 6.9 11/20/2018 09:08 AM    Albumin 4.0 11/20/2018 09:08 AM    A-G Ratio 1.4 11/20/2018 09:08 AM      Lab Results   Component Value Date/Time    Hemoglobin A1c 6.3 (H) 11/20/2018 09:08 AM         No results for input(s): CPK, CKMB, TROIQ in the last 72 hours. No lab exists for component: CKQMB, CPKMB        Problem List:     Problem List  Date Reviewed: 1/4/2019          Codes Class Noted    Obesity, morbid ([facility] [address] ICD-10-CM: E66.01  ICD-9-CM: 278.01  4/10/2018        Advanced care planning/counseling discussion ICD-10-CM: Z71.89  ICD-9-CM: V65.49  4/11/2017    Overview Addendum 4/11/2017 10:29 AM by Jeff Young RN     NN discussed with patient about an Advanced Medical Directive. Provided patient blank Advanced Medical Directive and 'Your Right to Decide' Booklet. NN reviewed Advanced Medical Directive paperwork with patient with a brief description of how to complete the form. Requested that if document completed, to please provide a copy of completed Advanced Medical Directive to PCP office. Patient verbalized understanding. Dysthymia ICD-10-CM: F34.1  ICD-9-CM: 300.4  4/1/2016        HTN (hypertension) (Chronic) ICD-10-CM: I10  ICD-9-CM: 401.9  3/17/2016        Hyperlipidemia LDL goal <130 (Chronic) ICD-10-CM: E78.5  ICD-9-CM: 272.4  3/17/2016        Fracture ICD-10-CM: T14. 8XXA  ICD-9-CM: 829.0  2/19/2016                Shakeel Main MD, [facility]

## 2019-01-04 NOTE — PATIENT INSTRUCTIONS
Echo- LV function, Pedal Edema. Change Lasix to 40mg po BID  Refer to Lymphedema clinic. See Dr. Claudean Freund in 1 month.

## 2019-01-04 NOTE — PROGRESS NOTES
1. Have you been to the ER, urgent care clinic since your last visit? Hospitalized since your last visit? No    2. Have you seen or consulted any other health care providers outside of the [facility] since your last visit? Include any pap smears or colon screening. No     Pt reports Med rec. Completed.      Chief Complaint   Patient presents with    Chest Pain (Angina)    Shortness of Breath     swelling limbs     Visit Vitals  /72 (BP 1 Location: Right arm, BP Patient Position: Sitting)   Pulse 80   Resp 16   Ht 4' 11\" (1.499 m)   Wt 292 lb (132.5 kg)   SpO2 97%   BMI 58.98 kg/m²

## 2019-01-05 DIAGNOSIS — F34.1 DYSTHYMIA: ICD-10-CM

## 2019-01-05 DIAGNOSIS — E78.5 HYPERLIPIDEMIA LDL GOAL <130: Chronic | ICD-10-CM

## 2019-01-05 DIAGNOSIS — I10 ESSENTIAL HYPERTENSION: Chronic | ICD-10-CM

## 2019-01-05 RX ORDER — FUROSEMIDE 20 MG/1
TABLET ORAL
Qty: 90 TAB | Refills: 0 | Status: SHIPPED | OUTPATIENT
Start: 2019-01-05 | End: 2019-01-14

## 2019-01-05 RX ORDER — ATORVASTATIN CALCIUM 20 MG/1
TABLET, FILM COATED ORAL
Qty: 90 TAB | Refills: 0 | Status: SHIPPED | OUTPATIENT
Start: 2019-01-05 | End: 2019-03-31 | Stop reason: SDUPTHER

## 2019-01-05 RX ORDER — FLUOXETINE HYDROCHLORIDE 20 MG/1
CAPSULE ORAL
Qty: 90 CAP | Refills: 0 | Status: SHIPPED | OUTPATIENT
Start: 2019-01-05 | End: 2019-03-31 | Stop reason: SDUPTHER

## 2019-01-11 DIAGNOSIS — I10 ESSENTIAL HYPERTENSION: Chronic | ICD-10-CM

## 2019-01-11 RX ORDER — OLMESARTAN MEDOXOMIL 20 MG/1
TABLET ORAL
Qty: 90 TAB | Refills: 1 | Status: SHIPPED | OUTPATIENT
Start: 2019-01-11 | End: 2019-06-17 | Stop reason: SDUPTHER

## 2019-01-14 ENCOUNTER — TELEPHONE (OUTPATIENT)
Dept: CARDIOLOGY CLINIC | Age: 74
End: 2019-01-14

## 2019-01-14 DIAGNOSIS — I10 ESSENTIAL HYPERTENSION: ICD-10-CM

## 2019-01-14 RX ORDER — FUROSEMIDE 40 MG/1
40 TABLET ORAL 2 TIMES DAILY
Qty: 180 TAB | Refills: 0 | Status: SHIPPED | OUTPATIENT
Start: 2019-01-14 | End: 2019-04-08 | Stop reason: SDUPTHER

## 2019-01-14 NOTE — TELEPHONE ENCOUNTER
Medication change per VO of Dr. Yokasta Dunaway. Pt to increase Lasix to 40mg BID. Rx sent to pt preferred pharmacy.

## 2019-01-15 ENCOUNTER — CLINICAL SUPPORT (OUTPATIENT)
Dept: CARDIOLOGY CLINIC | Age: 74
End: 2019-01-15

## 2019-01-15 DIAGNOSIS — R60.0 PEDAL EDEMA: Primary | ICD-10-CM

## 2019-01-15 NOTE — PROGRESS NOTES
Per Dr. Merlin Kulkarni, [facility] 96. ID verified per protocol. Test and risks explained to patient. Consent signed after all questions answered. Started saline lock #22 gauge in Left FA. 2 sticks. Good blood return and flushed without difficulty. At 9:05 am, activated Definity (1.3 mL in 8.7 ml NS) injected  X 2. (Total 4.2 mLs given). No complaint of voiced. Echo images obtained. Removed saline lock and applied pressure until homeostasis achieved. Dressing applied. Patient instructed to leave dressing on times 1 hr. Verbalized understanding. Patient waited in echo room until echocardiogram completed. Patient without complaints of voiced.

## 2019-01-22 ENCOUNTER — TELEPHONE (OUTPATIENT)
Dept: CARDIOLOGY CLINIC | Age: 74
End: 2019-01-22

## 2019-01-22 NOTE — TELEPHONE ENCOUNTER
Call placed to discuss echo results with pt per VO of Dr. Sreekanth Kaur. Advised pt of normal results. Pt states her feet are better, but still swollen. Discussed pt need for compression stockings and Lymphedema clinic f/u. Pt states she is unsure if Lymph clinic contacted her. Pt given phone number to contact clinic to schedule appt. Pt would like compression stocking Rx mailed to her.

## 2019-02-11 ENCOUNTER — OFFICE VISIT (OUTPATIENT)
Dept: CARDIOLOGY CLINIC | Age: 74
End: 2019-02-11

## 2019-02-11 VITALS
OXYGEN SATURATION: 96 % | RESPIRATION RATE: 18 BRPM | HEIGHT: 59 IN | WEIGHT: 292 LBS | BODY MASS INDEX: 58.87 KG/M2 | SYSTOLIC BLOOD PRESSURE: 132 MMHG | DIASTOLIC BLOOD PRESSURE: 70 MMHG | HEART RATE: 67 BPM

## 2019-02-11 DIAGNOSIS — E66.01 OBESITY, MORBID (HCC): ICD-10-CM

## 2019-02-11 DIAGNOSIS — E78.5 HYPERLIPIDEMIA LDL GOAL <130: ICD-10-CM

## 2019-02-11 DIAGNOSIS — I10 ESSENTIAL HYPERTENSION: ICD-10-CM

## 2019-02-11 DIAGNOSIS — R60.0 BILATERAL LEG EDEMA: Primary | ICD-10-CM

## 2019-02-11 NOTE — PROGRESS NOTES
Office Follow-up    NAME: Thu Dunn   :  1945  MRM:  [id number]    Date:  2019            Assessment:     Problem List  Date Reviewed: 2019          Codes Class Noted    Obesity, morbid ([facility] [address] ICD-10-CM: E66.01  ICD-9-CM: 278.01  4/10/2018        Advanced care planning/counseling discussion ICD-10-CM: Z71.89  ICD-9-CM: V65.49  2017    Overview Addendum 2017 10:29 AM by Benedict Carlos RN     NN discussed with patient about an Advanced Medical Directive. Provided patient blank Advanced Medical Directive and 'Your Right to Decide' Booklet. NN reviewed Advanced Medical Directive paperwork with patient with a brief description of how to complete the form. Requested that if document completed, to please provide a copy of completed Advanced Medical Directive to PCP office. Patient verbalized understanding. Dysthymia ICD-10-CM: F34.1  ICD-9-CM: 300.4  2016        HTN (hypertension) (Chronic) ICD-10-CM: I10  ICD-9-CM: 401.9  3/17/2016        Hyperlipidemia LDL goal <130 (Chronic) ICD-10-CM: E78.5  ICD-9-CM: 272.4  3/17/2016        Fracture ICD-10-CM: T14. 8XXA  ICD-9-CM: 829.0  2016                 Plan:     1. Lower extremity edema: Likely due to dependent as well as lymphedema. Continue Lasix 40 mg twice daily. Increase physical activity. Reduce salt intake. Follow-up with the lymphedema clinic to see if she would benefit from any sequential compression devices. 2. Shortness of breath on exertion could be due to sleep apnea. She is under evaluation for titration of CPAP machine. 3.  Hypertension: Blood pressure is controlled. Continue current medications. 4. Dyslipidemia: Continue Lipitor. 5. See me again in 6 months. Subjective: Thu Dunn, a 68y.o. year-old who presents for followup. She has known history of hypertension, dyslipidemia, recent diagnosis of sleep apnea still in the evaluation for titration.   She had seen me on January 4, 2019 for increasing lower extremity edema. History did not suggest anything significant however we went in and got a echocardiogram which was normal.  I believe majority of the lower extremity edema is due to lymphedema. She has a lymphedema clinic appointment coming up soon. He is currently taking Lasix 40 mg twice daily which has helped her some. Exam:     Physical Exam:  Visit Vitals  /70   Pulse 67   Resp 18   Ht 4' 11\" (1.499 m)   Wt 292 lb (132.5 kg)   SpO2 96%   BMI 58.98 kg/m²     General appearance - alert, well appearing, and in no distress  Mental status - affect appropriate to mood  Eyes - sclera anicteric, moist mucous membranes  Neck - supple, no significant adenopathy  Chest - clear to auscultation, no wheezes, rales or rhonchi  Heart - normal rate, regular rhythm, normal S1, S2, no murmurs, rubs, clicks or gallops  Abdomen - soft, nontender, nondistended, no masses or organomegaly  Extremities - peripheral pulses normal,2-3+ pedal edema  Skin - normal coloration  no rashes    Medications:     Current Outpatient Medications   Medication Sig    furosemide (LASIX) 40 mg tablet Take 1 Tab by mouth two (2) times a day. Change affective: 1/14/19    olmesartan (BENICAR) 20 mg tablet TAKE 1 TAB BY MOUTH DAILY.  atorvastatin (LIPITOR) 20 mg tablet TAKE 1 TABLET BY MOUTH EVERY DAY    FLUoxetine (PROZAC) 20 mg capsule TAKE 1 CAPSULE BY MOUTH EVERY DAY    megestrol (MEGACE) 20 mg tablet Take 1 Tab by mouth daily. Pt can increase to 2tab po every day if still has bleeding    esomeprazole (NEXIUM) 40 mg capsule Take 1 Cap by mouth daily for 90 days. No current facility-administered medications for this visit. Diagnostic Data Review:       No specialty comments available.       Lab Review:     Lab Results   Component Value Date/Time    Cholesterol, total 177 11/20/2018 09:08 AM    HDL Cholesterol 53 11/20/2018 09:08 AM    LDL, calculated 101 (H) 11/20/2018 09:08 AM Triglyceride 115 11/20/2018 09:08 AM     Lab Results   Component Value Date/Time    Creatinine (POC) 0.7 02/19/2016 07:44 AM    Creatinine 0.77 11/20/2018 09:08 AM     Lab Results   Component Value Date/Time    BUN 20 11/20/2018 09:08 AM    BUN (POC) 30 (H) 02/19/2016 07:44 AM     Lab Results   Component Value Date/Time    Potassium 4.6 11/20/2018 09:08 AM     Lab Results   Component Value Date/Time    Hemoglobin A1c 6.3 (H) 11/20/2018 09:08 AM     Lab Results   Component Value Date/Time    Hemoglobin (POC) 12.9 02/19/2016 07:44 AM    HGB 13.0 11/20/2018 09:08 AM     Lab Results   Component Value Date/Time    PLATELET 221 64/17/6789 09:08 AM     No results for input(s): CPK, CKMB, TROIQ in the last 72 hours. No lab exists for component: CKQMB, CPKMB             ___________________________________________________    David Lin.  Kym Mallory MD, [facility]

## 2019-02-11 NOTE — PROGRESS NOTES
Chief Complaint   Patient presents with    Shortness of Breath     one month follow up    Other     edema follow up     1. Have you been to the ER, urgent care clinic since your last visit? Hospitalized since your last visit? Yes When: 02/05/19 Where: [facility] Reason for visit: D&C    2. Have you seen or consulted any other health care providers outside of the [address] [place] [address] since your last visit? Include any pap smears or colon screening.  No

## 2019-02-27 ENCOUNTER — HOSPITAL ENCOUNTER (OUTPATIENT)
Dept: PHYSICAL THERAPY | Age: 74
Discharge: HOME | End: 2019-02-27
Payer: MEDICARE

## 2019-02-27 PROCEDURE — 97530 THERAPEUTIC ACTIVITIES: CPT

## 2019-02-27 PROCEDURE — 97140 MANUAL THERAPY 1/> REGIONS: CPT

## 2019-02-27 PROCEDURE — 97162 PT EVAL MOD COMPLEX 30 MIN: CPT

## 2019-03-31 DIAGNOSIS — F34.1 DYSTHYMIA: ICD-10-CM

## 2019-03-31 DIAGNOSIS — I10 ESSENTIAL HYPERTENSION: Chronic | ICD-10-CM

## 2019-03-31 DIAGNOSIS — E78.5 HYPERLIPIDEMIA LDL GOAL <130: Chronic | ICD-10-CM

## 2019-03-31 RX ORDER — ATORVASTATIN CALCIUM 20 MG/1
TABLET, FILM COATED ORAL
Qty: 90 TAB | Refills: 0 | Status: SHIPPED | OUTPATIENT
Start: 2019-03-31 | End: 2019-06-17 | Stop reason: SDUPTHER

## 2019-03-31 RX ORDER — FLUOXETINE HYDROCHLORIDE 20 MG/1
CAPSULE ORAL
Qty: 90 CAP | Refills: 0 | Status: SHIPPED | OUTPATIENT
Start: 2019-03-31 | End: 2019-06-17 | Stop reason: SDUPTHER

## 2019-03-31 RX ORDER — FUROSEMIDE 20 MG/1
TABLET ORAL
Qty: 90 TAB | Refills: 0 | Status: SHIPPED | OUTPATIENT
Start: 2019-03-31 | End: 2019-06-17 | Stop reason: SDUPTHER

## 2019-04-08 DIAGNOSIS — I10 ESSENTIAL HYPERTENSION: ICD-10-CM

## 2019-04-08 RX ORDER — FUROSEMIDE 40 MG/1
40 TABLET ORAL 2 TIMES DAILY
Qty: 180 TAB | Refills: 0 | Status: SHIPPED | OUTPATIENT
Start: 2019-04-08 | End: 2019-07-06 | Stop reason: SDUPTHER

## 2019-06-17 ENCOUNTER — OFFICE VISIT (OUTPATIENT)
Dept: INTERNAL MEDICINE CLINIC | Age: 74
End: 2019-06-17

## 2019-06-17 VITALS
TEMPERATURE: 98 F | RESPIRATION RATE: 15 BRPM | OXYGEN SATURATION: 94 % | SYSTOLIC BLOOD PRESSURE: 137 MMHG | BODY MASS INDEX: 59.07 KG/M2 | WEIGHT: 293 LBS | DIASTOLIC BLOOD PRESSURE: 68 MMHG | HEIGHT: 59 IN | HEART RATE: 84 BPM

## 2019-06-17 DIAGNOSIS — J45.21 MILD INTERMITTENT ASTHMA WITH ACUTE EXACERBATION: ICD-10-CM

## 2019-06-17 DIAGNOSIS — J40 BRONCHITIS: Primary | ICD-10-CM

## 2019-06-17 DIAGNOSIS — E78.5 HYPERLIPIDEMIA LDL GOAL <130: Chronic | ICD-10-CM

## 2019-06-17 DIAGNOSIS — I10 ESSENTIAL HYPERTENSION: Chronic | ICD-10-CM

## 2019-06-17 DIAGNOSIS — F34.1 DYSTHYMIA: ICD-10-CM

## 2019-06-17 RX ORDER — LEVALBUTEROL INHALATION SOLUTION 1.25 MG/3ML
1.25 SOLUTION RESPIRATORY (INHALATION)
Qty: 3 ML | Refills: 0
Start: 2019-06-17 | End: 2019-06-17

## 2019-06-17 RX ORDER — PREDNISONE 20 MG/1
TABLET ORAL
Qty: 18 TAB | Refills: 0 | Status: SHIPPED | OUTPATIENT
Start: 2019-06-17 | End: 2019-08-15 | Stop reason: ALTCHOICE

## 2019-06-17 RX ORDER — FUROSEMIDE 20 MG/1
TABLET ORAL
Qty: 90 TAB | Refills: 0 | Status: SHIPPED | OUTPATIENT
Start: 2019-06-17 | End: 2019-07-09

## 2019-06-17 RX ORDER — FLUOXETINE HYDROCHLORIDE 20 MG/1
CAPSULE ORAL
Qty: 90 CAP | Refills: 0 | Status: SHIPPED | OUTPATIENT
Start: 2019-06-17 | End: 2020-02-13 | Stop reason: SDUPTHER

## 2019-06-17 RX ORDER — ATORVASTATIN CALCIUM 20 MG/1
TABLET, FILM COATED ORAL
Qty: 90 TAB | Refills: 0 | Status: SHIPPED | OUTPATIENT
Start: 2019-06-17 | End: 2020-02-13 | Stop reason: SDUPTHER

## 2019-06-17 RX ORDER — OLMESARTAN MEDOXOMIL 20 MG/1
TABLET ORAL
Qty: 90 TAB | Refills: 0 | Status: SHIPPED | OUTPATIENT
Start: 2019-06-17 | End: 2020-02-13 | Stop reason: SDUPTHER

## 2019-06-17 RX ORDER — ALBUTEROL SULFATE 90 UG/1
2 AEROSOL, METERED RESPIRATORY (INHALATION)
Qty: 1 INHALER | Refills: 2 | Status: SHIPPED | OUTPATIENT
Start: 2019-06-17 | End: 2020-02-13 | Stop reason: SDUPTHER

## 2019-06-17 RX ORDER — AZITHROMYCIN 250 MG/1
250 TABLET, FILM COATED ORAL SEE ADMIN INSTRUCTIONS
Qty: 6 TAB | Refills: 0 | Status: SHIPPED | OUTPATIENT
Start: 2019-06-17 | End: 2019-06-22

## 2019-06-17 RX ORDER — BUDESONIDE AND FORMOTEROL FUMARATE DIHYDRATE 80; 4.5 UG/1; UG/1
2 AEROSOL RESPIRATORY (INHALATION) 2 TIMES DAILY
Qty: 1 INHALER | Refills: 0 | Status: SHIPPED | OUTPATIENT
Start: 2019-06-17 | End: 2019-06-25 | Stop reason: ALTCHOICE

## 2019-06-17 NOTE — PROGRESS NOTES
HISTORY OF PRESENT ILLNESS  Kat De Leon is a 68 y.o. female. HPI  Presents with complaints of persistent cough and shortness of breath that began 4 days ago and has progressively worsened. Cough has become productive of thick yellow sputum and she has been feeling tight in upper chest with audible wheezing. Denies fever, chills. Has history of asthma and has an old Albuterol inhaler at home that she has been using since her symptoms began. Has not been taking anything OTC for current symptoms. Has been having to sit upright at night to sleep due to coughing. She is overdue to follow up chronic issues with Dr Jamee Hurst and plans to return to office for follow up appointment. She is running out of her medications and requesting refills today. Patient Active Problem List   Diagnosis Code    Fracture T14. 8XXA    HTN (hypertension) I10    Hyperlipidemia LDL goal <130 E78.5    Dysthymia F34.1    Advanced care planning/counseling discussion Z71.89    Obesity, morbid ([address] E66.01     Past Surgical History:   Procedure Laterality Date    HX COLECTOMY      Partial, adhesions, diverticulitis    HX DILATION AND CURETTAGE  06/2018    Performed by Dr Jad Bradley at [facility] [id number]  02/05/2019    [facility]    HX HEENT      HX HYSTEROSCOPY  02/05/2019    Hysteroscopy, D&C, Myosure.  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[address])   Social Needs    Financial resource strain: Not on file    Food insecurity:     Worry: Not on file     Inability: Not on file   [place] needs:     Medical: Not on file     Non-medical: Not on file   Tobacco Use    Smoking status: Never Smoker    Smokeless tobacco: Never Used   Substance and Sexual Activity    Alcohol use: Yes     Comment: glass of wine each day    Drug use: Not on file    Sexual activity: Not Currently     Partners: Male   Lifestyle    Physical activity:     Days per week: Not on file     Minutes per session: Not on file    Stress: Not on file   Relationships    Social connections:     Talks on phone: Not on file     Gets together: Not on file     Attends [employer] service: Not on file     Active member of club or organization: Not on file     Attends meetings of clubs or organizations: Not on file     Relationship status: Not on file    Intimate partner violence:     Fear of current or ex partner: Not on file     Emotionally abused: Not on file     Physically abused: Not on file     Forced sexual activity: Not on file   Other Topics Concern    Not on file   Social History Narrative    Not on file     Family History   Problem Relation Age of Onset    Hypertension Mother     Alzheimer Mother     Hypertension Father     Cancer Father         Prostate     Current Outpatient Medications   Medication Sig    FLUoxetine (PROZAC) 20 mg capsule TAKE 1 CAPSULE BY MOUTH EVERY DAY    atorvastatin (LIPITOR) 20 mg tablet TAKE 1 TABLET BY MOUTH EVERY DAY    furosemide (LASIX) 20 mg tablet TAKE 1 TABLET BY MOUTH EVERY DAY    olmesartan (BENICAR) 20 mg tablet TAKE 1 TAB BY MOUTH DAILY.  levalbuterol (XOPENEX) 1.25 mg/3 mL nebu 3 mL by Nebulization route now for 1 dose.  albuterol (PROVENTIL HFA, VENTOLIN HFA, PROAIR HFA) 90 mcg/actuation inhaler Take 2 Puffs by inhalation every four (4) hours as needed for Wheezing.  azithromycin (ZITHROMAX) 250 mg tablet Take 1 Tab by mouth See Admin Instructions for 5 days.     predniSONE (DELTASONE) 20 mg tablet Take 3 tabs daily x 3 days then 2 tabs daily x 3 days then 1 tab daily x 2 days then 1/2 tab daily x 2 days    budesonide-formoterol (SYMBICORT) 80-4.5 mcg/actuation HFAA Take 2 Puffs by inhalation two (2) times a day. Rinse mouth with water after each use.  furosemide (LASIX) 40 mg tablet TAKE 1 TAB BY MOUTH TWO (2) TIMES A DAY. CHANGE AFFECTIVE: 1/14/19    megestrol (MEGACE) 20 mg tablet Take 1 Tab by mouth daily. Pt can increase to 2tab po every day if still has bleeding    esomeprazole (NEXIUM) 40 mg capsule Take 1 Cap by mouth daily for 90 days. No current facility-administered medications for this visit. Allergies   Allergen Reactions    Ciprofloxacin Swelling    Penicillins Swelling     Unsure of reaction    Sulfa (Sulfonamide Antibiotics) Unknown (comments)     She doesn't remember       Immunization History   Administered Date(s) Administered    Influenza High Dose Vaccine PF 10/01/2015, 10/10/2017, 10/10/2018    Pneumococcal Conjugate (PCV-13) 04/11/2018    Pneumococcal Polysaccharide (PPSV-23) 04/10/2017    Tdap 10/16/2013       Review of Systems   Constitutional: Positive for malaise/fatigue. Negative for chills and fever. HENT: Negative for congestion and sore throat. Respiratory: Positive for cough, sputum production, shortness of breath and wheezing. Cardiovascular: Negative for chest pain and palpitations. Gastrointestinal: Negative for nausea and vomiting. Musculoskeletal: Negative for myalgias. Neurological: Negative for tingling and headaches. Psychiatric/Behavioral: The patient is nervous/anxious. /68 (BP 1 Location: Left arm, BP Patient Position: Sitting)   Pulse 84   Temp 98 °F (36.7 °C) (Oral)   Resp 15   Ht 4' 11\" (1.499 m)   Wt 296 lb 9.6 oz (134.5 kg)   SpO2 94%   BMI 59.91 kg/m²   O2 sats increased to 98% after nebulizer treatment. Physical Exam   Constitutional: She is oriented to person, place, and time. She appears well-developed and well-nourished. Speaking in full sentences   HENT:   Head: Normocephalic and atraumatic.    Right Ear: External ear normal.   Left Ear: External ear normal.   Nose: Nose normal. Mouth/Throat: Oropharynx is clear and moist.   Neck: Normal range of motion. Neck supple. No thyromegaly present. Cardiovascular: Normal rate and regular rhythm. Pulmonary/Chest: Effort normal. She has wheezes. She has no rales. Diffuse loud wheezing and rhonchi throughout all lung fields   Musculoskeletal: Normal range of motion. Lymphadenopathy:     She has no cervical adenopathy. Neurological: She is alert and oriented to person, place, and time. Skin: Skin is warm and dry. Psychiatric: She has a normal mood and affect. Her behavior is normal.   Nursing note and vitals reviewed. ASSESSMENT and PLAN  Diagnoses and all orders for this visit:    1. Bronchitis  -     azithromycin (ZITHROMAX) 250 mg tablet; Take 1 Tab by mouth See Admin Instructions for 5 days. 2. Mild intermittent asthma with acute exacerbation  -     LEVALBUTEROL, INHAL. SOL., FDA-APPROVED FINAL, NON-COMPOUND UNIT DOSE, 0.5 MG  -     levalbuterol (XOPENEX) 1.25 mg/3 mL nebu; 3 mL by Nebulization route now for 1 dose. -     GA PRESSURIZED/NONPRESSURIZED INHALATION TREATMENT  -     albuterol (PROVENTIL HFA, VENTOLIN HFA, PROAIR HFA) 90 mcg/actuation inhaler; Take 2 Puffs by inhalation every four (4) hours as needed for Wheezing.  -     predniSONE (DELTASONE) 20 mg tablet; Take 3 tabs daily x 3 days then 2 tabs daily x 3 days then 1 tab daily x 2 days then 1/2 tab daily x 2 days  -     budesonide-formoterol (SYMBICORT) 80-4.5 mcg/actuation HFAA; Take 2 Puffs by inhalation two (2) times a day. Rinse mouth with water after each use. 3. Dysthymia  -     FLUoxetine (PROZAC) 20 mg capsule; TAKE 1 CAPSULE BY MOUTH EVERY DAY    4. Hyperlipidemia LDL goal <130  -     atorvastatin (LIPITOR) 20 mg tablet; TAKE 1 TABLET BY MOUTH EVERY DAY    5. Essential hypertension  -     furosemide (LASIX) 20 mg tablet; TAKE 1 TABLET BY MOUTH EVERY DAY  -     olmesartan (BENICAR) 20 mg tablet; TAKE 1 TAB BY MOUTH DAILY.         Follow up with  Delfino in 1 week for recheck and follow up chronic issues  lab results and schedule of future lab studies reviewed with patient  reviewed diet, exercise and weight control  reviewed medications and side effects in detail

## 2019-06-17 NOTE — PATIENT INSTRUCTIONS
Bronchitis: Care Instructions Your Care Instructions Bronchitis is inflammation of the bronchial tubes, which carry air to the lungs. The tubes swell and produce mucus, or phlegm. The mucus and inflamed bronchial tubes make you cough. You may have trouble breathing. Most cases of bronchitis are caused by viruses like those that cause colds. Antibiotics usually do not help and they may be harmful. Bronchitis usually develops rapidly and lasts about 2 to 3 weeks in otherwise healthy people. Follow-up care is a key part of your treatment and safety. Be sure to make and go to all appointments, and call your doctor if you are having problems. It's also a good idea to know your test results and keep a list of the medicines you take. How can you care for yourself at home? · Take all medicines exactly as prescribed. Call your doctor if you think you are having a problem with your medicine. · Get some extra rest. 
· Take an over-the-counter pain medicine, such as acetaminophen (Tylenol), ibuprofen (Advil, Motrin), or naproxen (Aleve) to reduce fever and relieve body aches. Read and follow all instructions on the label. · Do not take two or more pain medicines at the same time unless the doctor told you to. Many pain medicines have acetaminophen, which is Tylenol. Too much acetaminophen (Tylenol) can be harmful. · Take an over-the-counter cough medicine that contains dextromethorphan to help quiet a dry, hacking cough so that you can sleep. Avoid cough medicines that have more than one active ingredient. Read and follow all instructions on the label. · Breathe moist air from a humidifier, hot shower, or sink filled with hot water. The heat and moisture will thin mucus so you can cough it out. · Do not smoke. Smoking can make bronchitis worse. If you need help quitting, talk to your doctor about stop-smoking programs and medicines. These can increase your chances of quitting for good. When should you call for help? Call 911 anytime you think you may need emergency care. For example, call if: 
  · You have severe trouble breathing.  
 Call your doctor now or seek immediate medical care if: 
  · You have new or worse trouble breathing.  
  · You cough up dark brown or bloody mucus (sputum).  
  · You have a new or higher fever.  
  · You have a new rash.  
 Watch closely for changes in your health, and be sure to contact your doctor if: 
  · You cough more deeply or more often, especially if you notice more mucus or a change in the color of your mucus.  
  · You are not getting better as expected. Where can you learn more? Go to http://gena-estefani.info/. Enter H333 in the search box to learn more about \"Bronchitis: Care Instructions. \" Current as of: September 5, 2018 Content Version: 11.9 © 8192-8285 [employer]. Care instructions adapted under license by [employer] (which disclaims liability or warranty for this information). If you have questions about a medical condition or this instruction, always ask your healthcare professional. [address] any warranty or liability for your use of this information. Asthma Attack: Care Instructions Your Care Instructions During an asthma attack, the airways swell and narrow. This makes it hard to breathe. Severe asthma attacks can be life-threatening, but you can help prevent them by keeping your asthma under control and treating symptoms before they get bad. Symptoms include being short of breath, having chest tightness, coughing, and wheezing. Noting and treating these symptoms can also help you avoid future trips to the emergency room. The doctor has checked you carefully, but problems can develop later. If you notice any problems or new symptoms, get medical treatment right away. Follow-up care is a key part of your treatment and safety.  Be sure to make and go to all appointments, and call your doctor if you are having problems. It's also a good idea to know your test results and keep a list of the medicines you take. How can you care for yourself at home? · Follow your asthma action plan to prevent and treat attacks. If you don't have an asthma action plan, work with your doctor to create one. · Take your asthma medicines exactly as prescribed. Talk to your doctor right away if you have any questions about how to take them. ? Use your quick-relief medicine when you have symptoms of an attack. Quick-relief medicine is usually an albuterol inhaler. Some people need to use quick-relief medicine before they exercise. ? Take your controller medicine every day, not just when you have symptoms. Controller medicine is usually an inhaled corticosteroid. The goal is to prevent problems before they occur. Don't use your controller medicine to treat an attack that has already started. It doesn't work fast enough to help. ? If your doctor prescribed corticosteroid pills to use during an attack, take them exactly as prescribed. It may take hours for the pills to work, but they may make the episode shorter and help you breathe better. ? Keep your quick-relief medicine with you at all times. · Talk to your doctor before using other medicines. Some medicines, such as aspirin, can cause asthma attacks in some people. · If you have a peak flow meter, use it to check how well you are breathing. This can help you predict when an asthma attack is going to occur. Then you can take medicine to prevent the asthma attack or make it less severe. · Do not smoke or allow others to smoke around you. Avoid smoky places. Smoking makes asthma worse. If you need help quitting, talk to your doctor about stop-smoking programs and medicines. These can increase your chances of quitting for good.  
· Learn what triggers an asthma attack for you, and avoid the triggers when you can. Common triggers include colds, smoke, air pollution, dust, pollen, mold, pets, cockroaches, stress, and cold air. · Avoid colds and the flu. Get a pneumococcal vaccine shot. If you have had one before, ask your doctor if you need a second dose. Get a flu vaccine every fall. If you must be around people with colds or the flu, wash your hands often. When should you call for help? Call 911 anytime you think you may need emergency care. For example, call if: 
  · You have severe trouble breathing.  
 Call your doctor now or seek immediate medical care if: 
  · Your symptoms do not get better after you have followed your asthma action plan.  
  · You have new or worse trouble breathing.  
  · Your coughing and wheezing get worse.  
  · You cough up dark brown or bloody mucus (sputum).  
  · You have a new or higher fever.  
 Watch closely for changes in your health, and be sure to contact your doctor if: 
  · You need to use quick-relief medicine on more than 2 days a week (unless it is just for exercise).  
  · You cough more deeply or more often, especially if you notice more mucus or a change in the color of your mucus.  
  · You are not getting better as expected. Where can you learn more? Go to http://gena-etsefani.info/. Enter U018 in the search box to learn more about \"Asthma Attack: Care Instructions. \" Current as of: September 5, 2018 Content Version: 11.9 © 6646-0186 [employer]. Care instructions adapted under license by [employer] (which disclaims liability or warranty for this information). If you have questions about a medical condition or this instruction, always ask your healthcare professional. [place] [address] any warranty or liability for your use of this information.

## 2019-06-25 ENCOUNTER — OFFICE VISIT (OUTPATIENT)
Dept: INTERNAL MEDICINE CLINIC | Age: 74
End: 2019-06-25

## 2019-06-25 VITALS
SYSTOLIC BLOOD PRESSURE: 120 MMHG | WEIGHT: 293 LBS | OXYGEN SATURATION: 96 % | TEMPERATURE: 97.5 F | BODY MASS INDEX: 59.07 KG/M2 | HEART RATE: 77 BPM | HEIGHT: 59 IN | DIASTOLIC BLOOD PRESSURE: 65 MMHG | RESPIRATION RATE: 22 BRPM

## 2019-06-25 DIAGNOSIS — G47.33 OBSTRUCTIVE SLEEP APNEA SYNDROME: ICD-10-CM

## 2019-06-25 DIAGNOSIS — I10 ESSENTIAL HYPERTENSION: ICD-10-CM

## 2019-06-25 DIAGNOSIS — F34.1 DYSTHYMIA: ICD-10-CM

## 2019-06-25 DIAGNOSIS — E78.5 HYPERLIPIDEMIA LDL GOAL <130: ICD-10-CM

## 2019-06-25 DIAGNOSIS — Z12.11 SCREENING FOR COLON CANCER: ICD-10-CM

## 2019-06-25 DIAGNOSIS — R60.0 EDEMA LEG: ICD-10-CM

## 2019-06-25 DIAGNOSIS — R73.03 PREDIABETES: ICD-10-CM

## 2019-06-25 DIAGNOSIS — J45.21 MILD INTERMITTENT ASTHMA WITH ACUTE EXACERBATION: ICD-10-CM

## 2019-06-25 DIAGNOSIS — J40 BRONCHITIS: Primary | ICD-10-CM

## 2019-06-25 DIAGNOSIS — Z00.00 MEDICARE ANNUAL WELLNESS VISIT, SUBSEQUENT: ICD-10-CM

## 2019-06-25 RX ORDER — BUDESONIDE AND FORMOTEROL FUMARATE DIHYDRATE 160; 4.5 UG/1; UG/1
2 AEROSOL RESPIRATORY (INHALATION) 2 TIMES DAILY
Qty: 1 INHALER | Refills: 4 | Status: SHIPPED | OUTPATIENT
Start: 2019-06-25 | End: 2019-09-20 | Stop reason: SDUPTHER

## 2019-06-25 RX ORDER — DOXYCYCLINE 100 MG/1
100 CAPSULE ORAL 2 TIMES DAILY
Qty: 20 CAP | Refills: 0 | Status: SHIPPED | OUTPATIENT
Start: 2019-06-25 | End: 2020-02-13

## 2019-06-25 NOTE — PROGRESS NOTES
HISTORY OF PRESENT ILLNESS  Fernando Murillo is a 68 y.o. female. HPI   Bronchitis hx: Pt presented to [facility] c/o persistent productive cough (with thick yellow sputum), SOB, and wheezing. Denied f/c. Confirmed hx of asthma and uses Albuterol inhaler. Diffused loud wheezing and rhonchi was noted in her physical exam. Pt was prescribed Z-Shola (250 mg for 5 days). She is f/u today. Bronchitis f/u: Pt notes she is continuing on Prednisone and Albuterol inhaler. She notes her productive cough is still persistent. Dysthymia: Stable and well-managed. Asthma: Pt notes that her cough is better but occasionally turns asthmatic and she hernandes trouble speaking. Sleep apnea: Pt continues on CPAP. She notes she is down to 6-7 incidents per hour. She notes that she has not been using her CPAP this past week. She notes that while she is on her machine she is only wakes up once throughout the night to go to the bathroom. She notes she is getting around 6-8 hours of sleep a night. Lymphedema; Pt notes severe edema in her legs and feet. She notes she can only wear two sandals. She notes she went to her lymphedema specialist and she was told to do wrapping. Pt explains she is worried that she would not be able to get on her compression stockings. She is also concerned that her insurance does not cover the stockings and she will have to pay out of pocket. She notes she saw some affordable compression stockings on line. Hypertension ROS: taking medications as instructed, no medication side effects noted, no TIA's, no chest pain on exertion, no dyspnea on exertion, no swelling of ankles. New concerns: BP in office today is 120/65. Prediabetic Review of Systems - home glucose monitoring: is performed regularly, further diabetic ROS: no polyuria or polydipsia, no chest pain, dyspnea or TIA's, no numbness, tingling or pain in extremities.   Pt notes that she is not able to monitor her diet to lose weight. Hyperlipidemia:  Cardiovascular risk analysis - 68 y.o. female LDL goal is under 130. ROS: taking medications as instructed, no medication side effects noted, no TIA's, no chest pain on exertion, no dyspnea on exertion, no swelling of ankles. Tolerating meds, no myalgias or other side effects noted  New concerns: Pt's last LDL was 101 on 11/20/18. Pt notes she tries to go to the pool to do exercises 4x/wk. She notes that she thinks it is helping her balance. Review of Systems   Respiratory: Positive for cough and sputum production. Musculoskeletal:        Edema of leg's and feet   All other systems reviewed and are negative. Physical Exam   Constitutional: She is oriented to person, place, and time. She appears well-developed and well-nourished. HENT:   Head: Normocephalic and atraumatic. Right Ear: External ear normal.   Left Ear: External ear normal.   Nose: Nose normal.   Mouth/Throat: Oropharynx is clear and moist.   Eyes: Pupils are equal, round, and reactive to light. Conjunctivae and EOM are normal.   Neck: Normal range of motion. Neck supple. Cardiovascular: Normal rate, regular rhythm, normal heart sounds and intact distal pulses. Pulmonary/Chest: Effort normal and breath sounds normal. Right breast exhibits no inverted nipple, no mass, no nipple discharge, no skin change and no tenderness. Left breast exhibits no inverted nipple, no mass, no nipple discharge, no skin change and no tenderness. No breast swelling, tenderness, discharge or bleeding. Breasts are symmetrical.   Lungs sound clear   Abdominal: Soft. Bowel sounds are normal.   Genitourinary: Rectum normal and vagina normal. Rectal exam shows anal tone normal and guaiac negative stool. No breast swelling, tenderness, discharge or bleeding. Musculoskeletal: Normal range of motion. Neurological: She is alert and oriented to person, place, and time. Skin: Skin is warm and dry.    Psychiatric: She has a normal mood and affect. Her behavior is normal. Judgment and thought content normal.   Nursing note and vitals reviewed. ASSESSMENT and PLAN  Diagnoses and all orders for this visit:    1. Bronchitis  Increase dosage of Symbicort. Noted that oxygen levels have increased, and she is able to talk in full sentences. -     doxycycline (MONODOX) 100 mg capsule; Take 1 Cap by mouth two (2) times a day. 2. Mild intermittent asthma with acute exacerbation  -     budesonide-formoterol (SYMBICORT) 160-4.5 mcg/actuation HFAA; Take 2 Puffs by inhalation two (2) times a day. 3. Dysthymia  Stable and well-managed. No change in medications. 4. Hyperlipidemia LDL goal <130  Stable, no myalgias. I do not recommend any change in medications.   -     LIPID PANEL  -     METABOLIC PANEL, COMPREHENSIVE    5. Essential hypertension  BP is at goal. I do not recommend any change in medications. -     CBC W/O DIFF    6. Obstructive sleep apnea syndrome  Advised pt to try and get back on her CPAP in a few days. 7. Edema leg  Discussed with pt that Prednisone likely caused her swelling and it should go down when she finishes it. Will continue to monitor for improvements or changes. 8. Prediabetes  Discussed with pt the slightly elevated sugars are likely due to prednisone. Will continue to monitor for improvements or changes.  -     HEMOGLOBIN A1C WITH EAG    9. Screening for colon cancer  -     REFERRAL TO GASTROENTEROLOGY     Lab results and schedule of future lab studies reviewed with patient. Reviewed diet, exercise and weight control. Written by Honorio Colón, as dictated by Cherylene Stage, MD.     Current diagnosis and concerns discussed with pt at length. Understands risks and benefits or current treatment plan and medications and accepts the treatment and medication with any possible risks. Pt asks appropriate questions which were answered. Pt instructed to call with any concerns or problems.     This note will not be viewable in [address]

## 2019-06-25 NOTE — PATIENT INSTRUCTIONS
Medicare Wellness Visit, Female The best way to live healthy is to have a lifestyle where you eat a well-balanced diet, exercise regularly, limit alcohol use, and quit all forms of tobacco/nicotine, if applicable. Regular preventive services are another way to keep healthy. Preventive services (vaccines, screening tests, monitoring & exams) can help personalize your care plan, which helps you manage your own care. Screening tests can find health problems at the earliest stages, when they are easiest to treat. [facility] follows the current, evidence-based guidelines published by the [facility] Megan ([facility]) when recommending preventive services for our patients. Because we follow these guidelines, sometimes recommendations change over time as research supports it. (For example, mammograms used to be recommended annually. Even though Medicare will still pay for an annual mammogram, the newer guidelines recommend a mammogram every two years for women of average risk.) Of course, you and your doctor may decide to screen more often for some diseases, based on your risk and your health status. Preventive services for you include: - Medicare offers their members a free annual wellness visit, which is time for you and your primary care provider to discuss and plan for your preventive service needs. Take advantage of this benefit every year! 
-All adults over the age of 72 should receive the recommended pneumonia vaccines. Current USPSTF guidelines recommend a series of two vaccines for the best pneumonia protection.  
-All adults should have a flu vaccine yearly and a tetanus vaccine every 10 years. All adults age 61 and older should receive a shingles vaccine once in their lifetime.   
-A bone mass density test is recommended when a woman turns 65 to screen for osteoporosis. This test is only recommended one time, as a screening. Some providers will use this same test as a disease monitoring tool if you already have osteoporosis. -All adults age 38-68 who are overweight should have a diabetes screening test once every three years.  
-Other screening tests and preventive services for persons with diabetes include: an eye exam to screen for diabetic retinopathy, a kidney function test, a foot exam, and stricter control over your cholesterol.  
-Cardiovascular screening for adults with routine risk involves an electrocardiogram (ECG) at intervals determined by your doctor.  
-Colorectal cancer screenings should be done for adults age 54-65 with no increased risk factors for colorectal cancer. There are a number of acceptable methods of screening for this type of cancer. Each test has its own benefits and drawbacks. Discuss with your doctor what is most appropriate for you during your annual wellness visit. The different tests include: colonoscopy (considered the best screening method), a fecal occult blood test, a fecal DNA test, and sigmoidoscopy. -Breast cancer screenings are recommended every other year for women of normal risk, age 54-69. 
-Cervical cancer screenings for women over age 72 are only recommended with certain risk factors.  
-All adults born [facility] should be screened once for Hepatitis C. Here is a list of your current Health Maintenance items (your personalized list of preventive services) with a due date: 
Health Maintenance Due Topic Date Due  
 Colonoscopy  09/29/1963  Glaucoma Screening   09/29/2011  Bone Mineral Density   09/29/2011 [place] Annual Well Visit  04/11/2019

## 2019-06-25 NOTE — PROGRESS NOTES
This is the Subsequent Medicare Annual Wellness Exam, performed 12 months or more after the Initial AWV or the last Subsequent AWV    I have reviewed the patient's medical history in detail and updated the computerized patient record. History     Past Medical History:   Diagnosis Date    Asthma     COPD (chronic obstructive pulmonary disease) ([address]     Diverticulitis     H/O mammogram 07/25/2018    Negative    Hyperlipidemia     Hyperlipidemia LDL goal <130 3/17/2016    Hypertension     Pap smear for cervical cancer screening 05/04/2018    Negative    Psychiatric disorder     Depression      Past Surgical History:   Procedure Laterality Date    HX COLECTOMY      Partial, adhesions, diverticulitis    HX DILATION AND CURETTAGE  06/2018    Performed by Dr Mir Smith at [facility] [id number]  02/05/2019    Brooksie Meter    HX HEENT      HX HYSTEROSCOPY  02/05/2019    Hysteroscopy, D&C, Myosure. - Benign    HX MENISCECTOMY      left knee     Current Outpatient Medications   Medication Sig Dispense Refill    doxycycline (MONODOX) 100 mg capsule Take 1 Cap by mouth two (2) times a day. 20 Cap 0    budesonide-formoterol (SYMBICORT) 160-4.5 mcg/actuation HFAA Take 2 Puffs by inhalation two (2) times a day. 1 Inhaler 4    FLUoxetine (PROZAC) 20 mg capsule TAKE 1 CAPSULE BY MOUTH EVERY DAY 90 Cap 0    atorvastatin (LIPITOR) 20 mg tablet TAKE 1 TABLET BY MOUTH EVERY DAY 90 Tab 0    furosemide (LASIX) 20 mg tablet TAKE 1 TABLET BY MOUTH EVERY DAY 90 Tab 0    olmesartan (BENICAR) 20 mg tablet TAKE 1 TAB BY MOUTH DAILY. 90 Tab 0    albuterol (PROVENTIL HFA, VENTOLIN HFA, PROAIR HFA) 90 mcg/actuation inhaler Take 2 Puffs by inhalation every four (4) hours as needed for Wheezing.  1 Inhaler 2    predniSONE (DELTASONE) 20 mg tablet Take 3 tabs daily x 3 days then 2 tabs daily x 3 days then 1 tab daily x 2 days then 1/2 tab daily x 2 days 18 Tab 0    furosemide (LASIX) 40 mg tablet TAKE 1 TAB BY MOUTH TWO (2) TIMES A DAY. CHANGE AFFECTIVE: 1/14/19 180 Tab 0    megestrol (MEGACE) 20 mg tablet Take 1 Tab by mouth daily. Pt can increase to 2tab po every day if still has bleeding 60 Tab 0    esomeprazole (NEXIUM) 40 mg capsule Take 1 Cap by mouth daily for 90 days. 90 Cap 1     Allergies   Allergen Reactions    Ciprofloxacin Swelling    Penicillins Swelling     Unsure of reaction    Sulfa (Sulfonamide Antibiotics) Unknown (comments)     She doesn't remember       Family History   Problem Relation Age of Onset    Hypertension Mother     Alzheimer Mother     Hypertension Father     Cancer Father         Prostate     Social History     Tobacco Use    Smoking status: Never Smoker    Smokeless tobacco: Never Used   Substance Use Topics    Alcohol use: Yes     Comment: glass of wine each day     Patient Active Problem List   Diagnosis Code    Fracture T14. 8XXA    HTN (hypertension) I10    Hyperlipidemia LDL goal <130 E78.5    Dysthymia F34.1    Advanced care planning/counseling discussion Z71.89    Obesity, morbid ([facility] [address] E66.01       Depression Risk Factor Screening:     3 most recent PHQ Screens 4/10/2017   Little interest or pleasure in doing things Not at all   Feeling down, depressed, irritable, or hopeless Not at all   Total Score PHQ 2 0     Alcohol Risk Factor Screening: You do not drink alcohol or very rarely. Functional Ability and Level of Safety:   Hearing Loss  Hearing is good. Activities of Daily Living  The home contains: no safety equipment. Patient does total self care    Fall Risk  Fall Risk Assessment, last 12 mths 6/17/2019   Able to walk? Yes   Fall in past 12 months?  No   Fall with injury? -   Number of falls in past 12 months -   Fall Risk Score -       Abuse Screen  Patient is not abused    Cognitive Screening   Evaluation of Cognitive Function:  Has your family/caregiver stated any concerns about your memory: no  Normal    Patient Care Team   Patient Care Team:  Leonides Lopez MD as PCP - General (Internal Medicine)    Assessment/Plan   Education and counseling provided:  Are appropriate based on today's review and evaluation  End-of-Life planning (with patient's consent)        Health Maintenance Due   Topic Date Due    COLONOSCOPY  09/29/1963    GLAUCOMA SCREENING Q2Y  09/29/2011    Bone Densitometry (Dexa) Screening  09/29/2011    MEDICARE YEARLY EXAM  04/11/2019     This note will not be viewable in [address]

## 2019-06-26 LAB
ALBUMIN SERPL-MCNC: 4 G/DL (ref 3.5–4.8)
ALBUMIN/GLOB SERPL: 1.5 {RATIO} (ref 1.2–2.2)
ALP SERPL-CCNC: 69 IU/L (ref 39–117)
ALT SERPL-CCNC: 23 IU/L (ref 0–32)
AST SERPL-CCNC: 14 IU/L (ref 0–40)
BILIRUB SERPL-MCNC: 0.5 MG/DL (ref 0–1.2)
BUN SERPL-MCNC: 23 MG/DL (ref 8–27)
BUN/CREAT SERPL: 26 (ref 12–28)
CALCIUM SERPL-MCNC: 9.1 MG/DL (ref 8.7–10.3)
CHLORIDE SERPL-SCNC: 104 MMOL/L (ref 96–106)
CHOLEST SERPL-MCNC: 163 MG/DL (ref 100–199)
CO2 SERPL-SCNC: 25 MMOL/L (ref 20–29)
CREAT SERPL-MCNC: 0.89 MG/DL (ref 0.57–1)
ERYTHROCYTE [DISTWIDTH] IN BLOOD BY AUTOMATED COUNT: 12.5 % (ref 12.3–15.4)
EST. AVERAGE GLUCOSE BLD GHB EST-MCNC: 140 MG/DL
GLOBULIN SER CALC-MCNC: 2.7 G/DL (ref 1.5–4.5)
GLUCOSE SERPL-MCNC: 123 MG/DL (ref 65–99)
HBA1C MFR BLD: 6.5 % (ref 4.8–5.6)
HCT VFR BLD AUTO: 44 % (ref 34–46.6)
HDLC SERPL-MCNC: 62 MG/DL
HGB BLD-MCNC: 14.1 G/DL (ref 11.1–15.9)
INTERPRETATION, 910389: NORMAL
LDLC SERPL CALC-MCNC: 80 MG/DL (ref 0–99)
Lab: NORMAL
MCH RBC QN AUTO: 29.8 PG (ref 26.6–33)
MCHC RBC AUTO-ENTMCNC: 32 G/DL (ref 31.5–35.7)
MCV RBC AUTO: 93 FL (ref 79–97)
PLATELET # BLD AUTO: 330 X10E3/UL (ref 150–450)
POTASSIUM SERPL-SCNC: 4.4 MMOL/L (ref 3.5–5.2)
PROT SERPL-MCNC: 6.7 G/DL (ref 6–8.5)
RBC # BLD AUTO: 4.73 X10E6/UL (ref 3.77–5.28)
SODIUM SERPL-SCNC: 143 MMOL/L (ref 134–144)
TRIGL SERPL-MCNC: 104 MG/DL (ref 0–149)
VLDLC SERPL CALC-MCNC: 21 MG/DL (ref 5–40)
WBC # BLD AUTO: 11.3 X10E3/UL (ref 3.4–10.8)

## 2019-07-06 DIAGNOSIS — I10 ESSENTIAL HYPERTENSION: ICD-10-CM

## 2019-07-09 RX ORDER — FUROSEMIDE 40 MG/1
40 TABLET ORAL 2 TIMES DAILY
Qty: 180 TAB | Refills: 0 | Status: SHIPPED | OUTPATIENT
Start: 2019-07-09 | End: 2022-09-26 | Stop reason: ALTCHOICE

## 2019-07-09 NOTE — TELEPHONE ENCOUNTER
Refill of Lasix 40 mg BID completed per VO of Dr. Loreto Hernandez.     Last OV: 2/2019  Next OV: 8/2019

## 2019-08-15 ENCOUNTER — OFFICE VISIT (OUTPATIENT)
Dept: CARDIOLOGY CLINIC | Age: 74
End: 2019-08-15

## 2019-08-15 VITALS
HEIGHT: 59 IN | BODY MASS INDEX: 59.07 KG/M2 | OXYGEN SATURATION: 96 % | DIASTOLIC BLOOD PRESSURE: 54 MMHG | RESPIRATION RATE: 24 BRPM | SYSTOLIC BLOOD PRESSURE: 104 MMHG | HEART RATE: 78 BPM | WEIGHT: 293 LBS

## 2019-08-15 DIAGNOSIS — E66.01 OBESITY, MORBID (HCC): ICD-10-CM

## 2019-08-15 DIAGNOSIS — I89.0 LYMPHEDEMA: ICD-10-CM

## 2019-08-15 DIAGNOSIS — I10 ESSENTIAL HYPERTENSION: Primary | ICD-10-CM

## 2019-08-15 NOTE — PROGRESS NOTES
Chief Complaint   Patient presents with    Follow-up    Hypertension    Shortness of Breath    Leg Swelling     BLE Edema     Visit Vitals  /54 (BP 1 Location: Left arm, BP Patient Position: Sitting)   Pulse 78   Resp 24   Ht 4' 11\" (1.499 m)   Wt 295 lb (133.8 kg)   SpO2 96%   BMI 59.58 kg/m²         Patient presents in office with c/o SOB, BLE edema with BLE pain from swelling. She has been taking her Lasix as 20 mg BID instead of 40 mg BID. She tried 40 mg BID, but frequent urination was causing problems with ADLs.      Recent Hospital Visit: None    Refills?: None

## 2019-08-15 NOTE — PROGRESS NOTES
Office Follow-up    NAME: Simeon Garcia   :  1945  MRM:  [id number]    Date:  8/15/2019            Assessment:     Problem List  Date Reviewed: 8/15/2019          Codes Class Noted    Obesity, morbid ([facility] [address] ICD-10-CM: E66.01  ICD-9-CM: 278.01  4/10/2018        Advanced care planning/counseling discussion ICD-10-CM: Z71.89  ICD-9-CM: V65.49  2017    Overview Addendum 2017 10:29 AM by Rodney Chicas RN     NN discussed with patient about an Advanced Medical Directive. Provided patient blank Advanced Medical Directive and 'Your Right to Decide' Booklet. NN reviewed Advanced Medical Directive paperwork with patient with a brief description of how to complete the form. Requested that if document completed, to please provide a copy of completed Advanced Medical Directive to PCP office. Patient verbalized understanding. Dysthymia ICD-10-CM: F34.1  ICD-9-CM: 300.4  2016        HTN (hypertension) (Chronic) ICD-10-CM: I10  ICD-9-CM: 401.9  3/17/2016        Hyperlipidemia LDL goal <130 (Chronic) ICD-10-CM: E78.5  ICD-9-CM: 272.4  3/17/2016        Fracture ICD-10-CM: T14. 8XXA  ICD-9-CM: 829.0  2016                 Plan:     1. Lower extremity edema: The lower extremity edema is due to lymphedema. Continue Lasix. Continue to visit lymphedema clinic for further evaluation including sequential compression treatments. She will likely start doing these sometime from September when she will have more resources available at that time. 2. Shortness of breath on exertion: This is multifactorial including obesity, sleep apnea. Continue using CPAP. 3.  Hypertension: Blood pressure is controlled. Continue current medications. .  4. Dyslipidemia: Continue Lipitor. 5. See me again in 6 months. Subjective: Simeon Garcia, a 68y.o. year-old who presents for followup. She has known history of hypertension, does sleep apnea, bilateral lower extremity lymphedema. She is here for routine follow-up. In the past cardiac work-up including echocardiogram were normal.  She was taking 40 mg of Lasix twice daily on last visit when I had increased it but she could not continue to take it because of significant diuresis and frequent bathroom visit. She is back on 20 mg twice daily. She has not lost any significant weight and there has not been any significant improvement in her lower extremity edema. She has been to the lymphedema clinic however a lot of the treatment suggestions require significant copayment as they are not covered by her Medicare insurance. She is currently invested in CPAP machine therefore she wanted to wait for some weeks before she could go to getting treatment at lymphedema clinic. Exam:     Physical Exam:  Visit Vitals  /54 (BP 1 Location: Left arm, BP Patient Position: Sitting)   Pulse 78   Resp 24   Ht 4' 11\" (1.499 m)   Wt 295 lb (133.8 kg)   SpO2 96%   BMI 59.58 kg/m²     General appearance - alert, well appearing, and in no distress  Mental status - affect appropriate to mood  Eyes - sclera anicteric, moist mucous membranes  Neck - supple, no significant adenopathy  Chest - clear to auscultation, no wheezes, rales or rhonchi  Heart - normal rate, regular rhythm, normal S1, S2, no murmurs, rubs, clicks or gallops  Abdomen - soft, nontender, nondistended, no masses or organomegaly  Extremities - peripheral pulses normal,2-3+ pedal edema  Skin - normal coloration  no rashes    Medications:     Current Outpatient Medications   Medication Sig    furosemide (LASIX) 40 mg tablet TAKE 1 TAB BY MOUTH TWO (2) TIMES A DAY. CHANGE AFFECTIVE: 1/14/19 (Patient taking differently: Take 20 mg by mouth two (2) times a day. Change affective: 1/14/19)    doxycycline (MONODOX) 100 mg capsule Take 1 Cap by mouth two (2) times a day.  budesonide-formoterol (SYMBICORT) 160-4.5 mcg/actuation HFAA Take 2 Puffs by inhalation two (2) times a day.     FLUoxetine (PROZAC) 20 mg capsule TAKE 1 CAPSULE BY MOUTH EVERY DAY    atorvastatin (LIPITOR) 20 mg tablet TAKE 1 TABLET BY MOUTH EVERY DAY    olmesartan (BENICAR) 20 mg tablet TAKE 1 TAB BY MOUTH DAILY.  albuterol (PROVENTIL HFA, VENTOLIN HFA, PROAIR HFA) 90 mcg/actuation inhaler Take 2 Puffs by inhalation every four (4) hours as needed for Wheezing.  megestrol (MEGACE) 20 mg tablet Take 1 Tab by mouth daily. Pt can increase to 2tab po every day if still has bleeding    esomeprazole (NEXIUM) 40 mg capsule Take 1 Cap by mouth daily for 90 days. No current facility-administered medications for this visit. Diagnostic Data Review:       No specialty comments available. Lab Review:     Lab Results   Component Value Date/Time    Cholesterol, total 163 06/25/2019 10:31 AM    HDL Cholesterol 62 06/25/2019 10:31 AM    LDL, calculated 80 06/25/2019 10:31 AM    Triglyceride 104 06/25/2019 10:31 AM     Lab Results   Component Value Date/Time    Creatinine (POC) 0.7 02/19/2016 07:44 AM    Creatinine 0.89 06/25/2019 10:31 AM     Lab Results   Component Value Date/Time    BUN 23 06/25/2019 10:31 AM    BUN (POC) 30 (H) 02/19/2016 07:44 AM     Lab Results   Component Value Date/Time    Potassium 4.4 06/25/2019 10:31 AM     Lab Results   Component Value Date/Time    Hemoglobin A1c 6.5 (H) 06/25/2019 10:31 AM     Lab Results   Component Value Date/Time    Hemoglobin (POC) 12.9 02/19/2016 07:44 AM    HGB 14.1 06/25/2019 10:31 AM     Lab Results   Component Value Date/Time    PLATELET 895 07/43/7525 10:31 AM     No results for input(s): CPK, CKMB, TROIQ in the last 72 hours. No lab exists for component: CKQMB, CPKMB             ___________________________________________________    Jaxon Genny.  Erik Moody MD, [facility]

## 2019-09-20 DIAGNOSIS — J45.21 MILD INTERMITTENT ASTHMA WITH ACUTE EXACERBATION: ICD-10-CM

## 2019-09-20 RX ORDER — BUDESONIDE AND FORMOTEROL FUMARATE DIHYDRATE 160; 4.5 UG/1; UG/1
AEROSOL RESPIRATORY (INHALATION)
Qty: 30.6 INHALER | Refills: 1 | Status: SHIPPED | OUTPATIENT
Start: 2019-09-20 | End: 2020-02-13 | Stop reason: SDUPTHER

## 2020-02-13 ENCOUNTER — OFFICE VISIT (OUTPATIENT)
Dept: INTERNAL MEDICINE CLINIC | Age: 75
End: 2020-02-13

## 2020-02-13 VITALS
WEIGHT: 291.2 LBS | OXYGEN SATURATION: 93 % | DIASTOLIC BLOOD PRESSURE: 80 MMHG | HEART RATE: 79 BPM | RESPIRATION RATE: 20 BRPM | BODY MASS INDEX: 58.71 KG/M2 | HEIGHT: 59 IN | SYSTOLIC BLOOD PRESSURE: 140 MMHG | TEMPERATURE: 98.2 F

## 2020-02-13 DIAGNOSIS — F34.1 DYSTHYMIA: Primary | ICD-10-CM

## 2020-02-13 DIAGNOSIS — E78.5 HYPERLIPIDEMIA LDL GOAL <130: ICD-10-CM

## 2020-02-13 DIAGNOSIS — E11.9 TYPE 2 DIABETES MELLITUS WITHOUT COMPLICATION, WITHOUT LONG-TERM CURRENT USE OF INSULIN (HCC): ICD-10-CM

## 2020-02-13 DIAGNOSIS — J45.21 MILD INTERMITTENT ASTHMA WITH ACUTE EXACERBATION: ICD-10-CM

## 2020-02-13 DIAGNOSIS — J40 BRONCHITIS: ICD-10-CM

## 2020-02-13 DIAGNOSIS — Z12.11 SCREENING FOR COLON CANCER: ICD-10-CM

## 2020-02-13 DIAGNOSIS — I10 ESSENTIAL HYPERTENSION: ICD-10-CM

## 2020-02-13 RX ORDER — FLUOXETINE HYDROCHLORIDE 20 MG/1
CAPSULE ORAL
Qty: 90 CAP | Refills: 1 | Status: SHIPPED | OUTPATIENT
Start: 2020-02-13 | End: 2020-08-05

## 2020-02-13 RX ORDER — ALBUTEROL SULFATE 90 UG/1
2 AEROSOL, METERED RESPIRATORY (INHALATION)
Qty: 1 INHALER | Refills: 2 | Status: SHIPPED | OUTPATIENT
Start: 2020-02-13 | End: 2020-11-18 | Stop reason: ALTCHOICE

## 2020-02-13 RX ORDER — OLMESARTAN MEDOXOMIL 20 MG/1
TABLET ORAL
Qty: 90 TAB | Refills: 1 | Status: SHIPPED | OUTPATIENT
Start: 2020-02-13 | End: 2020-08-06

## 2020-02-13 RX ORDER — BUDESONIDE AND FORMOTEROL FUMARATE DIHYDRATE 160; 4.5 UG/1; UG/1
2 AEROSOL RESPIRATORY (INHALATION) 2 TIMES DAILY
Qty: 30.6 INHALER | Refills: 5 | Status: SHIPPED | OUTPATIENT
Start: 2020-02-13

## 2020-02-13 RX ORDER — ATORVASTATIN CALCIUM 20 MG/1
TABLET, FILM COATED ORAL
Qty: 90 TAB | Refills: 1 | Status: SHIPPED | OUTPATIENT
Start: 2020-02-13 | End: 2020-08-06

## 2020-02-13 RX ORDER — DOXYCYCLINE 100 MG/1
100 CAPSULE ORAL 2 TIMES DAILY
Qty: 20 CAP | Refills: 0 | Status: SHIPPED | OUTPATIENT
Start: 2020-02-13 | End: 2020-11-18 | Stop reason: ALTCHOICE

## 2020-02-13 NOTE — PROGRESS NOTES
HISTORY OF PRESENT ILLNESS  Savannah Byrd is a 69 y.o. female. HPI   Cough: Pt has a productive cough, HA, and occasional nausea starting Sunday night. She has been taking Nyquil with some relief. She notes some loose stools. She is not on Doxycyline right now. She is concerned about pneumonia. Denies f/c or body aches. Hypertension ROS: taking medications as instructed, no medication side effects noted, no TIA's, no chest pain on exertion, no dyspnea on exertion, no swelling of ankles. New concerns: BP in office today is 140/80. Continues on Olmesartan 20 mg. Hyperlipidemia:  Cardiovascular risk analysis - 69 y.o. female LDL goal is under 100. ROS: taking medications as instructed, no medication side effects noted, no TIA's, no chest pain on exertion, no dyspnea on exertion, no swelling of ankles. Tolerating meds, no myalgias or other side effects noted  New concerns: Pt's last LDL was 80 on 6/25/19. Continues on Lipitor 20 mg. Diabetic Review of Systems - further diabetic ROS: no polyuria or polydipsia, no chest pain, dyspnea or TIA's, no numbness, tingling or pain in extremities. Other symptoms and concerns: Pt's last a1c was 6.5 on 6/25/19. LISETH: Stable and well-managed with CPAP. She has not been using it this week due to her cough. Lymphedema: She does not take the Bumex often, since it causes polyuria. Dysthymia: Stable, pt continues to comply with Prozac 20 mg. Asthma: Stable, with intermittent flares. She continues on Symbicort occasionally. She has not gotten her colonoscopy. She received flu vaccine in Sept/Oct. She is not UTD on her eye exam.     Review of Systems   Constitutional: Negative for chills and fever. Respiratory: Positive for cough (productive) and sputum production. Gastrointestinal: Positive for diarrhea and nausea. Musculoskeletal: Negative for myalgias. Neurological: Positive for headaches.    All other systems reviewed and are negative. Physical Exam  Constitutional:       Appearance: Normal appearance. HENT:      Right Ear: Hearing, tympanic membrane and external ear normal.      Left Ear: Hearing, tympanic membrane and external ear normal.      Mouth/Throat:      Mouth: Mucous membranes are moist.      Pharynx: Oropharynx is clear. Cardiovascular:      Rate and Rhythm: Normal rate and regular rhythm. Pulmonary:      Effort: Pulmonary effort is normal.      Breath sounds: Normal breath sounds and air entry. Comments: No pneumonia  Musculoskeletal: Normal range of motion. Skin:     General: Skin is warm and dry. Neurological:      General: No focal deficit present. Mental Status: She is alert and oriented to person, place, and time. Psychiatric:         Mood and Affect: Mood normal.         Behavior: Behavior normal.         ASSESSMENT and PLAN  Diagnoses and all orders for this visit:    1. Dysthymia  Stable and well-managed with Prozac 20 mg. No change in medications.   -     FLUoxetine (PROZAC) 20 mg capsule; TAKE 1 CAPSULE BY MOUTH EVERY DAY    2. Essential hypertension  BP is at goal with Olmesartan 20 mg. I do not recommend any change in medications.   -     METABOLIC PANEL, COMPREHENSIVE; Future  -     olmesartan (BENICAR) 20 mg tablet; TAKE 1 TAB BY MOUTH DAILY. 3. Hyperlipidemia LDL goal <130  Presumed stable with Lipitor 20 mg, will recheck labs today. Patient is tolerating medications with no myalgias. I do not recommend any change in treatment plan. -     LIPID PANEL; Future  -     atorvastatin (LIPITOR) 20 mg tablet; TAKE 1 TABLET BY MOUTH EVERY DAY    4. Mild intermittent asthma with acute exacerbation  Relatively stable, but not well-managed due to non compliance with inhalers. Advised pt to comply with her inhalers to properly treat her asthma and reduce risk of pneumonia or flares. Will continue to monitor for improvements or changes.   -     budesonide-formoteroL (SYMBICORT) 160-4.5 mcg/actuation HFAA; Take 2 Puffs by inhalation two (2) times a day. -     albuterol (PROVENTIL HFA, VENTOLIN HFA, PROAIR HFA) 90 mcg/actuation inhaler; Take 2 Puffs by inhalation every four (4) hours as needed for Wheezing. 5. Type 2 diabetes mellitus without complication, without long-term current use of insulin ([address]  Presumed stable, will recheck labs today. Encouraged pt to follow DM diet and monitor sugar intake. Informed pt that since she is DM, she needs to get labs done every 4-6 months. -     MICROALBUMIN, UR, RAND W/ MICROALB/CREAT RATIO; Future  -     HEMOGLOBIN A1C WITH EAG; Future    6. Screening for colon cancer  Discussed with pt that since she is having a change in stools, and she is over due for her colonoscopy, she should really comply. Referred pt to GI.   -     REFERRAL TO GASTROENTEROLOGY    7. Bronchitis  Discussed with pt that there is no pneumonia but since she has asthma she has an increased risk of pneumonia. Worried about her obesity also contributing to her atelectasis and pneumonia. Prescribed Doxycycline. Will continue to monitor for improvements or changes. -     doxycycline (MONODOX) 100 mg capsule; Take 1 Cap by mouth two (2) times a day. Lab results and schedule of future lab studies reviewed with patient. Reviewed diet, exercise and weight control. Written by Yessy Herrera, as dictated by Kimber Dhillon MD.     Current diagnosis and concerns discussed with pt at length. Understands risks and benefits or current treatment plan and medications and accepts the treatment and medication with any possible risks. Pt asks appropriate questions which were answered. Pt instructed to call with any concerns or problems. This note will not be viewable in [address]

## 2020-02-24 ENCOUNTER — OFFICE VISIT (OUTPATIENT)
Dept: CARDIOLOGY CLINIC | Age: 75
End: 2020-02-24

## 2020-02-24 VITALS
HEART RATE: 77 BPM | RESPIRATION RATE: 16 BRPM | DIASTOLIC BLOOD PRESSURE: 78 MMHG | SYSTOLIC BLOOD PRESSURE: 128 MMHG | BODY MASS INDEX: 58.87 KG/M2 | WEIGHT: 292 LBS | OXYGEN SATURATION: 96 % | HEIGHT: 59 IN

## 2020-02-24 DIAGNOSIS — R60.0 BILATERAL LEG EDEMA: ICD-10-CM

## 2020-02-24 DIAGNOSIS — E78.5 HYPERLIPIDEMIA LDL GOAL <130: Primary | Chronic | ICD-10-CM

## 2020-02-24 DIAGNOSIS — E66.01 OBESITY, MORBID (HCC): ICD-10-CM

## 2020-02-24 DIAGNOSIS — I10 ESSENTIAL HYPERTENSION: ICD-10-CM

## 2020-02-24 NOTE — PROGRESS NOTES
Chief Complaint   Patient presents with    Follow-up    Peripheral Edema    Shortness of Breath    Hypertension    Cholesterol Problem     Visit Vitals  /78 (BP 1 Location: Left arm, BP Patient Position: Sitting)   Pulse 77   Resp 16   Ht 4' 11\" (1.499 m)   Wt 292 lb (132.5 kg)   SpO2 96%   BMI 58.98 kg/m²     Patient presents to office with no complaints of pain,  dizziness, or chest pain. Patient recently being treated for respiratory infection. SOB noted on exertion. Not using CPAP as well as should be due to recent sickness and coughing. Almost completed abx therapy. Swelling still noted in lower legs. Patient reports not taking lasix as should be. tNo refills needed. No recent hostpitalizations reported. Less exercise now than in the summer.  Sravanthi Patel LPN

## 2020-02-24 NOTE — PROGRESS NOTES
Office Follow-up    NAME: Gus Dandy   :  1945  MRM:  [id number]    Date:  2020            Assessment:     Problem List  Date Reviewed: 2020          Codes Class Noted    Obesity, morbid ([facility] [address] ICD-10-CM: E66.01  ICD-9-CM: 278.01  4/10/2018        Advanced care planning/counseling discussion ICD-10-CM: Z71.89  ICD-9-CM: V65.49  2017    Overview Addendum 2017 10:29 AM by Bill Collins RN     NN discussed with patient about an Advanced Medical Directive. Provided patient blank Advanced Medical Directive and 'Your Right to Decide' Booklet. NN reviewed Advanced Medical Directive paperwork with patient with a brief description of how to complete the form. Requested that if document completed, to please provide a copy of completed Advanced Medical Directive to PCP office. Patient verbalized understanding. Dysthymia ICD-10-CM: F34.1  ICD-9-CM: 300.4  2016        HTN (hypertension) (Chronic) ICD-10-CM: I10  ICD-9-CM: 401.9  3/17/2016        Hyperlipidemia LDL goal <130 (Chronic) ICD-10-CM: E78.5  ICD-9-CM: 272.4  3/17/2016        Fracture ICD-10-CM: T14. 8XXA  ICD-9-CM: 829.0  2016                 Plan:     1. Lower extremity edema: Continue compression stockings if possible. Continue Lasix as needed. Increase mobility. Lose weight. 2. Shortness of breath on exertion: This is multifactorial including obesity, sleep apnea. Continue using CPAP. 3.  Hypertension: Blood pressure is controlled. Continue current medications. .  4. Dyslipidemia: Continue Lipitor. 5. See me again in 1 year. Subjective: Gus Dandy, a 69y.o. year-old who presents for followup. She has known history of hypertension, sleep apnea, bilateral lower extremity lymphedema, obesity, is here for 6 months follow-up. On previous visit when she has significant lower extremity edema we advised her to go to the lymphedema clinic.   Because there were few therapies in the lymphedema clinic that were not covered by her insurance and would require significant co-pay she did not continue at the lymphedema clinic. She has noticed that if she gets a pedicure and someone massages her calf or leg muscles that would improve her lower extremity edema. She was given Lasix 40 mg twice daily however she has not been able to take that dose because of overdiuresis and inability to continue doing anything after taking the medication especially going outside the house. Currently she is not using her CPAP machine. She is having some chest cold and is taking doxycycline. Exam:     Physical Exam:  Visit Vitals  /78 (BP 1 Location: Left arm, BP Patient Position: Sitting)   Pulse 77   Resp 16   Ht 4' 11\" (1.499 m)   Wt 292 lb (132.5 kg)   SpO2 96%   BMI 58.98 kg/m²     General appearance - alert, well appearing, and in no distress  Mental status - affect appropriate to mood  Eyes - sclera anicteric, moist mucous membranes  Neck - supple, no significant adenopathy  Chest - clear to auscultation, no wheezes, rales or rhonchi  Heart - normal rate, regular rhythm, normal S1, S2, no murmurs, rubs, clicks or gallops  Abdomen - soft, nontender, nondistended, no masses or organomegaly  Extremities - peripheral pulses normal,2-3+ pedal edema  Skin - normal coloration  no rashes    Medications:     Current Outpatient Medications   Medication Sig    doxycycline (MONODOX) 100 mg capsule Take 1 Cap by mouth two (2) times a day.  budesonide-formoteroL (SYMBICORT) 160-4.5 mcg/actuation HFAA Take 2 Puffs by inhalation two (2) times a day.  FLUoxetine (PROZAC) 20 mg capsule TAKE 1 CAPSULE BY MOUTH EVERY DAY    atorvastatin (LIPITOR) 20 mg tablet TAKE 1 TABLET BY MOUTH EVERY DAY    albuterol (PROVENTIL HFA, VENTOLIN HFA, PROAIR HFA) 90 mcg/actuation inhaler Take 2 Puffs by inhalation every four (4) hours as needed for Wheezing.     olmesartan (BENICAR) 20 mg tablet TAKE 1 TAB BY MOUTH DAILY.    furosemide (LASIX) 40 mg tablet TAKE 1 TAB BY MOUTH TWO (2) TIMES A DAY. CHANGE AFFECTIVE: 1/14/19 (Patient taking differently: Take 20 mg by mouth two (2) times daily as needed. Change affective: 1/14/19)    esomeprazole (NEXIUM) 40 mg capsule Take 1 Cap by mouth daily for 90 days. No current facility-administered medications for this visit. Diagnostic Data Review:       No specialty comments available. Lab Review:     Lab Results   Component Value Date/Time    Cholesterol, total 163 06/25/2019 10:31 AM    HDL Cholesterol 62 06/25/2019 10:31 AM    LDL, calculated 80 06/25/2019 10:31 AM    Triglyceride 104 06/25/2019 10:31 AM     Lab Results   Component Value Date/Time    Creatinine (POC) 0.7 02/19/2016 07:44 AM    Creatinine 0.89 06/25/2019 10:31 AM     Lab Results   Component Value Date/Time    BUN 23 06/25/2019 10:31 AM    BUN (POC) 30 (H) 02/19/2016 07:44 AM     Lab Results   Component Value Date/Time    Potassium 4.4 06/25/2019 10:31 AM     Lab Results   Component Value Date/Time    Hemoglobin A1c 6.5 (H) 06/25/2019 10:31 AM     Lab Results   Component Value Date/Time    Hemoglobin (POC) 12.9 02/19/2016 07:44 AM    HGB 14.1 06/25/2019 10:31 AM     Lab Results   Component Value Date/Time    PLATELET 388 19/17/0495 10:31 AM     No results for input(s): CPK, CKMB, TROIQ in the last 72 hours. No lab exists for component: CKQMB, CPKMB             ___________________________________________________    Kaleb Guaman.  Shannan Phan MD, [facility] [place]

## 2020-03-13 ENCOUNTER — TELEPHONE (OUTPATIENT)
Dept: INTERNAL MEDICINE CLINIC | Age: 75
End: 2020-03-13

## 2020-03-13 NOTE — TELEPHONE ENCOUNTER
----- Message from Angie Alvarez sent at 3/13/2020 10:26 AM EDT -----  Regarding: Cazares/telephone  Pt is requesting  for you to call her. She stated she is suppose to have lab work and she is concerned about going to the lab or going into the hospital to have it done. Pts number is [phone].

## 2020-03-13 NOTE — TELEPHONE ENCOUNTER
Contacted pt and advised that lab work can be delayed for a few weeks to see how thing play out with virus. If lab work is going to be completed consider going to one of the free standing [facility] instead of in a facility. Pt understood and will wait on labs for a few weeks but will have completed.

## 2020-07-01 LAB
ALBUMIN SERPL-MCNC: 4.3 G/DL (ref 3.7–4.7)
ALBUMIN/CREAT UR: 23 MG/G CREAT (ref 0–29)
ALBUMIN/GLOB SERPL: 1.7 {RATIO} (ref 1.2–2.2)
ALP SERPL-CCNC: 78 IU/L (ref 39–117)
ALT SERPL-CCNC: 30 IU/L (ref 0–32)
AST SERPL-CCNC: 23 IU/L (ref 0–40)
BILIRUB SERPL-MCNC: 0.5 MG/DL (ref 0–1.2)
BUN SERPL-MCNC: 18 MG/DL (ref 8–27)
BUN/CREAT SERPL: 26 (ref 12–28)
CALCIUM SERPL-MCNC: 9.3 MG/DL (ref 8.7–10.3)
CHLORIDE SERPL-SCNC: 105 MMOL/L (ref 96–106)
CHOLEST SERPL-MCNC: 181 MG/DL (ref 100–199)
CO2 SERPL-SCNC: 28 MMOL/L (ref 20–29)
CREAT SERPL-MCNC: 0.7 MG/DL (ref 0.57–1)
CREAT UR-MCNC: 42.8 MG/DL
EST. AVERAGE GLUCOSE BLD GHB EST-MCNC: 128 MG/DL
GLOBULIN SER CALC-MCNC: 2.5 G/DL (ref 1.5–4.5)
GLUCOSE SERPL-MCNC: 111 MG/DL (ref 65–99)
HBA1C MFR BLD: 6.1 % (ref 4.8–5.6)
HDLC SERPL-MCNC: 63 MG/DL
INTERPRETATION, 910389: NORMAL
LDLC SERPL CALC-MCNC: 101 MG/DL (ref 0–99)
Lab: NORMAL
MICROALBUMIN UR-MCNC: 10 UG/ML
POTASSIUM SERPL-SCNC: 5.1 MMOL/L (ref 3.5–5.2)
PROT SERPL-MCNC: 6.8 G/DL (ref 6–8.5)
SODIUM SERPL-SCNC: 144 MMOL/L (ref 134–144)
TRIGL SERPL-MCNC: 87 MG/DL (ref 0–149)
VLDLC SERPL CALC-MCNC: 17 MG/DL (ref 5–40)

## 2020-08-06 DIAGNOSIS — I10 ESSENTIAL HYPERTENSION: ICD-10-CM

## 2020-08-06 DIAGNOSIS — E78.5 HYPERLIPIDEMIA LDL GOAL <130: ICD-10-CM

## 2020-08-06 RX ORDER — ATORVASTATIN CALCIUM 20 MG/1
TABLET, FILM COATED ORAL
Qty: 90 TAB | Refills: 1 | Status: SHIPPED | OUTPATIENT
Start: 2020-08-06 | End: 2021-02-05

## 2020-08-06 RX ORDER — OLMESARTAN MEDOXOMIL 20 MG/1
TABLET ORAL
Qty: 90 TAB | Refills: 1 | Status: SHIPPED | OUTPATIENT
Start: 2020-08-06 | End: 2021-02-05

## 2020-11-18 ENCOUNTER — VIRTUAL VISIT (OUTPATIENT)
Dept: INTERNAL MEDICINE CLINIC | Age: 75
End: 2020-11-18
Payer: MEDICARE

## 2020-11-18 DIAGNOSIS — J45.21 MILD INTERMITTENT ASTHMA WITH ACUTE EXACERBATION: ICD-10-CM

## 2020-11-18 DIAGNOSIS — Z00.00 MEDICARE ANNUAL WELLNESS VISIT, SUBSEQUENT: Primary | ICD-10-CM

## 2020-11-18 DIAGNOSIS — I87.2 VENOUS INSUFFICIENCY: ICD-10-CM

## 2020-11-18 DIAGNOSIS — E11.9 TYPE 2 DIABETES MELLITUS WITHOUT COMPLICATION, WITHOUT LONG-TERM CURRENT USE OF INSULIN (HCC): ICD-10-CM

## 2020-11-18 DIAGNOSIS — F34.1 DYSTHYMIA: ICD-10-CM

## 2020-11-18 DIAGNOSIS — I10 ESSENTIAL HYPERTENSION: ICD-10-CM

## 2020-11-18 DIAGNOSIS — E78.5 HYPERLIPIDEMIA LDL GOAL <130: ICD-10-CM

## 2020-11-18 PROCEDURE — 2022F DILAT RTA XM EVC RTNOPTHY: CPT | Performed by: INTERNAL MEDICINE

## 2020-11-18 PROCEDURE — G8417 CALC BMI ABV UP PARAM F/U: HCPCS | Performed by: INTERNAL MEDICINE

## 2020-11-18 PROCEDURE — 99214 OFFICE O/P EST MOD 30 MIN: CPT | Performed by: INTERNAL MEDICINE

## 2020-11-18 PROCEDURE — G0439 PPPS, SUBSEQ VISIT: HCPCS | Performed by: INTERNAL MEDICINE

## 2020-11-18 PROCEDURE — G9711 PT HX TOT COL OR COLON CA: HCPCS | Performed by: INTERNAL MEDICINE

## 2020-11-18 PROCEDURE — G8536 NO DOC ELDER MAL SCRN: HCPCS | Performed by: INTERNAL MEDICINE

## 2020-11-18 PROCEDURE — G8756 NO BP MEASURE DOC: HCPCS | Performed by: INTERNAL MEDICINE

## 2020-11-18 PROCEDURE — G8427 DOCREV CUR MEDS BY ELIG CLIN: HCPCS | Performed by: INTERNAL MEDICINE

## 2020-11-18 PROCEDURE — G8400 PT W/DXA NO RESULTS DOC: HCPCS | Performed by: INTERNAL MEDICINE

## 2020-11-18 PROCEDURE — 1090F PRES/ABSN URINE INCON ASSESS: CPT | Performed by: INTERNAL MEDICINE

## 2020-11-18 PROCEDURE — 1101F PT FALLS ASSESS-DOCD LE1/YR: CPT | Performed by: INTERNAL MEDICINE

## 2020-11-18 PROCEDURE — G9717 DOC PT DX DEP/BP F/U NT REQ: HCPCS | Performed by: INTERNAL MEDICINE

## 2020-11-18 PROCEDURE — 3044F HG A1C LEVEL LT 7.0%: CPT | Performed by: INTERNAL MEDICINE

## 2020-11-18 RX ORDER — FLUOXETINE HYDROCHLORIDE 20 MG/1
20 CAPSULE ORAL DAILY
Qty: 90 CAP | Refills: 1 | Status: SHIPPED | OUTPATIENT
Start: 2020-11-18 | End: 2021-05-14

## 2020-11-18 RX ORDER — GLUCOSAMINE SULFATE 1500 MG
POWDER IN PACKET (EA) ORAL DAILY
COMMUNITY

## 2020-11-18 NOTE — PATIENT INSTRUCTIONS
Medicare Wellness Visit, Female The best way to live healthy is to have a lifestyle where you eat a well-balanced diet, exercise regularly, limit alcohol use, and quit all forms of tobacco/nicotine, if applicable. Regular preventive services are another way to keep healthy. Preventive services (vaccines, screening tests, monitoring & exams) can help personalize your care plan, which helps you manage your own care. Screening tests can find health problems at the earliest stages, when they are easiest to treat. [facility] follows the current, evidence-based guidelines published by the [facility] Arik Guzman ([facility]) when recommending preventive services for our patients. Because we follow these guidelines, sometimes recommendations change over time as research supports it. (For example, mammograms used to be recommended annually. Even though Medicare will still pay for an annual mammogram, the newer guidelines recommend a mammogram every two years for women of average risk). Of course, you and your doctor may decide to screen more often for some diseases, based on your risk and your co-morbidities (chronic disease you are already diagnosed with). Preventive services for you include: - Medicare offers their members a free annual wellness visit, which is time for you and your primary care provider to discuss and plan for your preventive service needs. Take advantage of this benefit every year! 
-All adults over the age of 72 should receive the recommended pneumonia vaccines. Current USPSTF guidelines recommend a series of two vaccines for the best pneumonia protection.  
-All adults should have a flu vaccine yearly and a tetanus vaccine every 10 years.  
-All adults age 48 and older should receive the shingles vaccines (series of two vaccines). -All adults age 38-68 who are overweight should have a diabetes screening test once every three years. -All adults born between [birth year] and 1965 should be screened once for Hepatitis C. 
-Other screening tests and preventive services for persons with diabetes include: an eye exam to screen for diabetic retinopathy, a kidney function test, a foot exam, and stricter control over your cholesterol.  
-Cardiovascular screening for adults with routine risk involves an electrocardiogram (ECG) at intervals determined by your doctor.  
-Colorectal cancer screenings should be done for adults age 54-65 with no increased risk factors for colorectal cancer. There are a number of acceptable methods of screening for this type of cancer. Each test has its own benefits and drawbacks. Discuss with your doctor what is most appropriate for you during your annual wellness visit. The different tests include: colonoscopy (considered the best screening method), a fecal occult blood test, a fecal DNA test, and sigmoidoscopy. 
 
-A bone mass density test is recommended when a woman turns 65 to screen for osteoporosis. This test is only recommended one time, as a screening. Some providers will use this same test as a disease monitoring tool if you already have osteoporosis. -Breast cancer screenings are recommended every other year for women of normal risk, age 54-69. 
-Cervical cancer screenings for women over age 72 are only recommended with certain risk factors. Here is a list of your current Health Maintenance items (your personalized list of preventive services) with a due date: 
Health Maintenance Due Topic Date Due  Shingles Vaccine (1 of 2) 09/29/1996  Bone Mineral Density   09/29/2011 [address] [place] [address] Annual Well Visit  06/25/2020  Mammogram  07/23/2020

## 2020-11-18 NOTE — PROGRESS NOTES
HISTORY OF PRESENT ILLNESS  Kalpana Gilliland is a 77 y.o. female who is present, aware, and consenting for real-time synchronous virtual video visit through [employer]. HPI  Mood: Stable, pt continues to comply with Prozac. Hypertension ROS: taking medications as instructed, no medication side effects noted, no TIA's, no chest pain on exertion, no dyspnea on exertion, no swelling of ankles. New concerns: Continues on Benicar. Hyperlipidemia:  Cardiovascular risk analysis - 77 y.o. female LDL goal is under 100. ROS: taking medications as instructed, no medication side effects noted, no TIA's, no chest pain on exertion, no dyspnea on exertion, no swelling of ankles. Tolerating meds, no myalgias or other side effects noted. New concerns: Pt's last LDL was 101 on 6/30/20. Continues on Lipitor. Diabetic Review of Systems - further diabetic ROS: no polyuria or polydipsia, no chest pain, dyspnea or TIA's, no numbness, tingling or pain in extremities. Other symptoms and concerns: Pt's last a1c was 6.1 on 6/30/20 with an estimated BG of 128. Mood: Stable, pt continues to comply with Prozac. Pt reports that she is doing Life Learning classes over Zoom everyday which she is really enjoying. Asthma: Stable with no reported issues. Veinous insufficiency: Stable with no reported changes. Pt reports that compression stockings caused too much ankle pain so she is not wearing them. She notes that she is looking for alternative options. Pt reports that she used to do pool exercises which helped her manage her pain. She notes that now that the pool has closed, she is using her cane again. Review of Systems   All other systems reviewed and are negative. Physical Exam  Vitals signs reviewed. Constitutional:       General: She is not in acute distress. Appearance: Normal appearance. She is not ill-appearing, toxic-appearing or diaphoretic.    HENT:      Right Ear: Hearing normal.      Left Ear: Hearing normal.      Nose: Nose normal.      Mouth/Throat:      Mouth: Mucous membranes are moist.      Pharynx: Oropharynx is clear. Eyes:      Conjunctiva/sclera: Conjunctivae normal.   Neck:      Musculoskeletal: Normal range of motion. Pulmonary:      Effort: No respiratory distress. Breath sounds: Normal air entry. Musculoskeletal: Normal range of motion. Skin:     General: Skin is warm and dry. Neurological:      General: No focal deficit present. Mental Status: She is alert and oriented to person, place, and time. Mental status is at baseline. Psychiatric:         Mood and Affect: Mood normal.         Behavior: Behavior normal.         Thought Content: Thought content normal.         Judgment: Judgment normal.         ASSESSMENT and PLAN  Diagnoses and all orders for this visit:    1. Medicare annual wellness visit, subsequent    2. Dysthymia  Stable and well-managed. No change in medications. 3. Essential hypertension  BP is at goal. I do not recommend any change in medications. 4. Hyperlipidemia LDL goal <130  Stable, patient is tolerating medications, no myalgias. I do not recommend any change in medications. 5. Mild intermittent asthma with acute exacerbation  Stable and well-managed. No change in medications. 6. Type 2 diabetes mellitus without complication, without long-term current use of insulin ([facility])  Sugars stable. Continue to follow diabetic diet and monitor sugars. 7. Venous insufficiency  Stable and well-managed. No change in medications. Lab results and schedule of future lab studies reviewed with patient. Reviewed diet, exercise and weight control. Written by Claire Jorgensen, as dictated by Simone Haines MD.     Current diagnosis and concerns discussed with pt at length. Understands risks and benefits or current treatment plan and medications and accepts the treatment and medication with any possible risks.  Pt asks appropriate questions which were answered. Pt instructed to call with any concerns or problems.

## 2020-11-18 NOTE — PROGRESS NOTES
This is the Subsequent Medicare Annual Wellness Exam, performed 12 months or more after the Initial AWV or the last Subsequent AWV    I have reviewed the patient's medical history in detail and updated the computerized patient record. Depression Risk Factor Screening:     3 most recent PHQ Screens 4/10/2017   Little interest or pleasure in doing things Not at all   Feeling down, depressed, irritable, or hopeless Not at all   Total Score PHQ 2 0       Alcohol Risk Screen   Do you average more than 1 drink per night or more than 7 drinks a week:  No    On any one occasion in the past three months have you have had more than 3 drinks containing alcohol:  No        Functional Ability and Level of Safety:   Hearing: Hearing is good. Activities of Daily Living: The home contains: no safety equipment. Patient does total self care     Ambulation: with mild difficulty     Fall Risk:  Fall Risk Assessment, last 12 mths 6/17/2019   Able to walk? Yes   Fall in past 12 months? No   Fall with injury? -   Number of falls in past 12 months -   Fall Risk Score -     Abuse Screen:  Patient is not abused       Cognitive Screening   Has your family/caregiver stated any concerns about your memory: no    Cognitive Screening: Normal - MMSE (Mini Mental Status Exam)    Assessment/Plan   Education and counseling provided:  Are appropriate based on today's review and evaluation    Diagnoses and all orders for this visit:    1. Type 2 diabetes mellitus without complication, without long-term current use of insulin ([address]    2. Dysthymia    3. Essential hypertension    4. Hyperlipidemia LDL goal <130    5.  Mild intermittent asthma with acute exacerbation        Health Maintenance Due     Health Maintenance Due   Topic Date Due    Shingrix Vaccine Age 49> (1 of 2) 09/29/1996    Bone Densitometry (Dexa) Screening  09/29/2011    Medicare Yearly Exam  06/25/2020    Breast Cancer Screen Mammogram  07/23/2020       Patient Care Team Patient Care Team:  Genet Patrick MD as PCP - General (Internal Medicine)  Genet Patrick MD as PCP - [facility] Empaneled Provider    History     Patient Active Problem List   Diagnosis Code    Fracture T14. 8XXA    HTN (hypertension) I10    Hyperlipidemia LDL goal <130 E78.5    Dysthymia F34.1    Advanced care planning/counseling discussion Z71.89    Obesity, morbid ([facility] [address] E66.01     Past Medical History:   Diagnosis Date    Asthma     COPD (chronic obstructive pulmonary disease) ([facility] [address]     Diverticulitis     H/O mammogram 07/25/2018    Negative    Hyperlipidemia     Hyperlipidemia LDL goal <130 3/17/2016    Hypertension     Pap smear for cervical cancer screening 05/04/2018    Negative    Psychiatric disorder     Depression      Past Surgical History:   Procedure Laterality Date    HX COLECTOMY      Partial, adhesions, diverticulitis    HX DILATION AND CURETTAGE  06/2018    Performed by Dr Dannielle Santoyo at [facility] [id number]  02/05/2019    Bruno Marin    HX HEENT      HX HYSTEROSCOPY  02/05/2019    Hysteroscopy, D&C, Myosure. - Benign    HX MENISCECTOMY      left knee     Current Outpatient Medications   Medication Sig Dispense Refill    cholecalciferol (Vitamin D3) 25 mcg (1,000 unit) cap Take  by mouth daily.  FLUoxetine (PROzac) 20 mg capsule TAKE 1 CAPSULE BY MOUTH EVERY DAY 7 Cap 0    olmesartan (BENICAR) 20 mg tablet TAKE 1 TABLET BY MOUTH EVERY DAY 90 Tab 1    atorvastatin (LIPITOR) 20 mg tablet TAKE 1 TABLET BY MOUTH EVERY DAY 90 Tab 1    budesonide-formoteroL (SYMBICORT) 160-4.5 mcg/actuation HFAA Take 2 Puffs by inhalation two (2) times a day. 30.6 Inhaler 5    furosemide (LASIX) 40 mg tablet TAKE 1 TAB BY MOUTH TWO (2) TIMES A DAY. CHANGE AFFECTIVE: 1/14/19 (Patient taking differently: Take 20 mg by mouth two (2) times daily as needed. Change affective: 1/14/19) 180 Tab 0    esomeprazole (NEXIUM) 40 mg capsule Take 1 Cap by mouth daily for 90 days. 90 Cap 1     Allergies   Allergen Reactions    Ciprofloxacin Swelling    Penicillins Swelling     Unsure of reaction    Sulfa (Sulfonamide Antibiotics) Unknown (comments)     She doesn't remember         Family History   Problem Relation Age of Onset    Hypertension Mother     Alzheimer Mother     Hypertension Father     Cancer Father         Prostate     Social History     Tobacco Use    Smoking status: Never Smoker    Smokeless tobacco: Never Used   Substance Use Topics    Alcohol use: Yes     Comment: glass of wine each day       Kalpana Gilliland, who was evaluated through a synchronous (real-time) audio-video encounter, and/or her healthcare decision maker, is aware that it is a billable service, with coverage as determined by her insurance carrier. She provided verbal consent to proceed: Yes, and patient identification was verified. It was conducted pursuant to the emergency declaration under the [address] [place] [address] authority and the [employer] General Act. A caregiver was present when appropriate. Ability to conduct physical exam was limited. I was in the office. The patient was at home.     Ronald Richard MD

## 2021-03-04 ENCOUNTER — OFFICE VISIT (OUTPATIENT)
Dept: CARDIOLOGY CLINIC | Age: 76
End: 2021-03-04
Payer: MEDICARE

## 2021-03-04 VITALS
SYSTOLIC BLOOD PRESSURE: 138 MMHG | HEART RATE: 93 BPM | DIASTOLIC BLOOD PRESSURE: 78 MMHG | OXYGEN SATURATION: 93 % | HEIGHT: 59 IN | BODY MASS INDEX: 59.07 KG/M2 | WEIGHT: 293 LBS

## 2021-03-04 DIAGNOSIS — E66.9 OBESITY, UNSPECIFIED CLASSIFICATION, UNSPECIFIED OBESITY TYPE, UNSPECIFIED WHETHER SERIOUS COMORBIDITY PRESENT: ICD-10-CM

## 2021-03-04 DIAGNOSIS — I10 ESSENTIAL HYPERTENSION: ICD-10-CM

## 2021-03-04 DIAGNOSIS — R60.0 BILATERAL LEG EDEMA: ICD-10-CM

## 2021-03-04 DIAGNOSIS — E78.5 HYPERLIPIDEMIA LDL GOAL <130: Primary | ICD-10-CM

## 2021-03-04 PROCEDURE — 99214 OFFICE O/P EST MOD 30 MIN: CPT | Performed by: INTERNAL MEDICINE

## 2021-03-04 PROCEDURE — G8400 PT W/DXA NO RESULTS DOC: HCPCS | Performed by: INTERNAL MEDICINE

## 2021-03-04 PROCEDURE — 1090F PRES/ABSN URINE INCON ASSESS: CPT | Performed by: INTERNAL MEDICINE

## 2021-03-04 PROCEDURE — G9717 DOC PT DX DEP/BP F/U NT REQ: HCPCS | Performed by: INTERNAL MEDICINE

## 2021-03-04 PROCEDURE — G8536 NO DOC ELDER MAL SCRN: HCPCS | Performed by: INTERNAL MEDICINE

## 2021-03-04 PROCEDURE — G8752 SYS BP LESS 140: HCPCS | Performed by: INTERNAL MEDICINE

## 2021-03-04 PROCEDURE — G8754 DIAS BP LESS 90: HCPCS | Performed by: INTERNAL MEDICINE

## 2021-03-04 PROCEDURE — G9711 PT HX TOT COL OR COLON CA: HCPCS | Performed by: INTERNAL MEDICINE

## 2021-03-04 PROCEDURE — G8428 CUR MEDS NOT DOCUMENT: HCPCS | Performed by: INTERNAL MEDICINE

## 2021-03-04 PROCEDURE — G8417 CALC BMI ABV UP PARAM F/U: HCPCS | Performed by: INTERNAL MEDICINE

## 2021-03-04 PROCEDURE — 1101F PT FALLS ASSESS-DOCD LE1/YR: CPT | Performed by: INTERNAL MEDICINE

## 2021-03-04 PROCEDURE — 93000 ELECTROCARDIOGRAM COMPLETE: CPT | Performed by: INTERNAL MEDICINE

## 2021-03-04 RX ORDER — ALBUTEROL SULFATE 0.83 MG/ML
SOLUTION RESPIRATORY (INHALATION)
COMMUNITY

## 2021-03-04 NOTE — PROGRESS NOTES
All orders entered per VO of Dr. Angeline Ibrahim:      Refer to Dr. Josef Lawson for Morbid Obesity Wt loss program

## 2021-03-04 NOTE — PROGRESS NOTES
Office Follow-up    NAME: Tesha Gutiérrez   :  1945  MRM:  [id number]    Date:  3/4/2021            Assessment:     Problem List  Date Reviewed: 2020          Codes Class Noted    Obesity, morbid ([facility] [address] ICD-10-CM: E66.01  ICD-9-CM: 278.01  4/10/2018        Advanced care planning/counseling discussion ICD-10-CM: Z71.89  ICD-9-CM: V65.49  2017    Overview Addendum 2017 10:29 AM by Duyen Thorpe RN     NN discussed with patient about an Advanced Medical Directive. Provided patient blank Advanced Medical Directive and 'Your Right to Decide' Booklet. NN reviewed Advanced Medical Directive paperwork with patient with a brief description of how to complete the form. Requested that if document completed, to please provide a copy of completed Advanced Medical Directive to PCP office. Patient verbalized understanding. Dysthymia ICD-10-CM: F34.1  ICD-9-CM: 300.4  2016        HTN (hypertension) (Chronic) ICD-10-CM: I10  ICD-9-CM: 401.9  3/17/2016        Hyperlipidemia LDL goal <130 (Chronic) ICD-10-CM: E78.5  ICD-9-CM: 272.4  3/17/2016        Fracture ICD-10-CM: T14. 8XXA  ICD-9-CM: 829.0  2016                 Plan:     1. Lower extremity edema: Multifactorial with primarily obesity and lymphedema causing the edema. Continue compression stockings if possible. Continue Lasix as needed. Increase mobility. Lose weight. 2. Shortness of breath on exertion: This is multifactorial including obesity, sleep apnea. Continue using CPAP. 3. Hypertension: Blood pressure is controlled. Continue current medications. 4. Dyslipidemia: Continue Lipitor. 5. Morbid Obesity: Refer to Dr. Gisel Rajan for Morbid Obesity Wt loss program.   6. LISETH: Inconsistent CPAP use. 7. See me again in 1 year. Subjective: Tesha Gutiérrez, a 77y.o. year-old who presents for followup.   She has known history of hypertension, sleep apnea, bilateral lower extremity lymphedema, obesity, is here for 6 months follow-up. Currently she is not using her CPAP machine. Exam:     Physical Exam:  Visit Vitals  /78 (BP 1 Location: Left upper arm, BP Patient Position: Sitting, BP Cuff Size: Adult)   Pulse 93   Ht 4' 11\" (1.499 m)   Wt 309 lb (140.2 kg)   SpO2 93%   BMI 62.41 kg/m²     General appearance - alert, well appearing, and in no distress  Mental status - affect appropriate to mood  Eyes - sclera anicteric, moist mucous membranes  Neck - supple, no significant adenopathy  Chest - clear to auscultation, no wheezes, rales or rhonchi  Heart - normal rate, regular rhythm, normal S1, S2, no murmurs, rubs, clicks or gallops  Abdomen - soft, nontender, nondistended, no masses or organomegaly  Extremities - peripheral pulses normal,2-3+ pedal edema  Skin - normal coloration  no rashes    Medications:     Current Outpatient Medications   Medication Sig    albuterol (PROVENTIL VENTOLIN) 2.5 mg /3 mL (0.083 %) nebu by Nebulization route.  olmesartan (BENICAR) 20 mg tablet TAKE 1 TABLET BY MOUTH EVERY DAY    atorvastatin (LIPITOR) 20 mg tablet TAKE 1 TABLET BY MOUTH EVERY DAY    cholecalciferol (Vitamin D3) 25 mcg (1,000 unit) cap Take  by mouth daily.  FLUoxetine (PROzac) 20 mg capsule Take 1 Cap by mouth daily.  budesonide-formoteroL (SYMBICORT) 160-4.5 mcg/actuation HFAA Take 2 Puffs by inhalation two (2) times a day.  furosemide (LASIX) 40 mg tablet TAKE 1 TAB BY MOUTH TWO (2) TIMES A DAY. CHANGE AFFECTIVE: 1/14/19 (Patient taking differently: Take 20 mg by mouth two (2) times daily as needed. Change affective: 1/14/19)    esomeprazole (NEXIUM) 40 mg capsule Take 1 Cap by mouth daily for 90 days. No current facility-administered medications for this visit. Diagnostic Data Review:       No specialty comments available.       Lab Review:     Lab Results   Component Value Date/Time    Cholesterol, total 181 06/30/2020 12:33 PM    HDL Cholesterol 63 06/30/2020 12:33 PM    LDL, calculated 101 (H) 06/30/2020 12:33 PM    Triglyceride 87 06/30/2020 12:33 PM     Lab Results   Component Value Date/Time    Creatinine (POC) 0.7 02/19/2016 07:44 AM    Creatinine 0.70 06/30/2020 12:33 PM     Lab Results   Component Value Date/Time    BUN 18 06/30/2020 12:33 PM    BUN (POC) 30 (H) 02/19/2016 07:44 AM     Lab Results   Component Value Date/Time    Potassium 5.1 06/30/2020 12:33 PM     Lab Results   Component Value Date/Time    Hemoglobin A1c 6.1 (H) 06/30/2020 12:33 PM     Lab Results   Component Value Date/Time    Hemoglobin (POC) 12.9 02/19/2016 07:44 AM    HGB 14.1 06/25/2019 10:31 AM     Lab Results   Component Value Date/Time    PLATELET 251 86/16/0274 10:31 AM     No results for input(s): CPK, CKMB, TROIQ in the last 72 hours. No lab exists for component: CKQMB, CPKMB             ___________________________________________________    Jeneal Other.  Bipin Will MD, [facility] [place]

## 2021-03-04 NOTE — PROGRESS NOTES
Kylie Flower is a 77 y.o. female    Visit Vitals  /78 (BP 1 Location: Left upper arm, BP Patient Position: Sitting, BP Cuff Size: Adult)   Pulse 93   Ht 4' 11\" (1.499 m)   Wt 309 lb (140.2 kg)   SpO2 93%   BMI 62.41 kg/m²       Chief Complaint   Patient presents with    Cholesterol Problem    Hypertension       Chest pain no  SOB YES  Dizziness no  Swelling LEGS AND FEET  Recent hospital visit no  Refills no

## 2021-03-06 ENCOUNTER — IMMUNIZATION (OUTPATIENT)
Dept: INTERNAL MEDICINE CLINIC | Age: 76
End: 2021-03-06
Payer: MEDICARE

## 2021-03-06 DIAGNOSIS — Z23 ENCOUNTER FOR IMMUNIZATION: Primary | ICD-10-CM

## 2021-03-06 PROCEDURE — 0001A COVID-19, MRNA, LNP-S, PF, 30MCG/0.3ML DOSE(PFIZER): CPT | Performed by: FAMILY MEDICINE

## 2021-03-06 PROCEDURE — 91300 COVID-19, MRNA, LNP-S, PF, 30MCG/0.3ML DOSE(PFIZER): CPT | Performed by: FAMILY MEDICINE

## 2021-03-27 ENCOUNTER — IMMUNIZATION (OUTPATIENT)
Dept: INTERNAL MEDICINE CLINIC | Age: 76
End: 2021-03-27
Payer: MEDICARE

## 2021-03-27 DIAGNOSIS — Z23 ENCOUNTER FOR IMMUNIZATION: Primary | ICD-10-CM

## 2021-03-27 PROCEDURE — 91300 COVID-19, MRNA, LNP-S, PF, 30MCG/0.3ML DOSE(PFIZER): CPT | Performed by: FAMILY MEDICINE

## 2021-03-27 PROCEDURE — 0002A COVID-19, MRNA, LNP-S, PF, 30MCG/0.3ML DOSE(PFIZER): CPT | Performed by: FAMILY MEDICINE

## 2021-05-04 DIAGNOSIS — I10 ESSENTIAL HYPERTENSION: ICD-10-CM

## 2021-05-04 DIAGNOSIS — E78.5 HYPERLIPIDEMIA LDL GOAL <130: ICD-10-CM

## 2021-05-04 RX ORDER — OLMESARTAN MEDOXOMIL 20 MG/1
TABLET ORAL
Qty: 90 TAB | Refills: 0 | Status: SHIPPED | OUTPATIENT
Start: 2021-05-04 | End: 2021-07-28

## 2021-05-04 RX ORDER — ATORVASTATIN CALCIUM 20 MG/1
TABLET, FILM COATED ORAL
Qty: 90 TAB | Refills: 0 | Status: SHIPPED | OUTPATIENT
Start: 2021-05-04 | End: 2021-07-28

## 2021-05-24 NOTE — TELEPHONE ENCOUNTER
Return call placed to pt (IDx2). Needs Lasix 40mg BID sent to her pharmacy. This is a recent change.      Rx sent/ No

## 2021-06-01 DIAGNOSIS — F34.1 DYSTHYMIA: ICD-10-CM

## 2021-06-02 RX ORDER — FLUOXETINE HYDROCHLORIDE 20 MG/1
CAPSULE ORAL
Qty: 30 CAPSULE | Refills: 0 | Status: SHIPPED | OUTPATIENT
Start: 2021-06-02 | End: 2021-07-01 | Stop reason: SDUPTHER

## 2021-06-11 ENCOUNTER — OFFICE VISIT (OUTPATIENT)
Dept: INTERNAL MEDICINE CLINIC | Age: 76
End: 2021-06-11
Payer: MEDICARE

## 2021-06-11 VITALS
TEMPERATURE: 98.6 F | OXYGEN SATURATION: 94 % | HEART RATE: 77 BPM | RESPIRATION RATE: 18 BRPM | SYSTOLIC BLOOD PRESSURE: 101 MMHG | WEIGHT: 293 LBS | HEIGHT: 59 IN | DIASTOLIC BLOOD PRESSURE: 66 MMHG | BODY MASS INDEX: 59.07 KG/M2

## 2021-06-11 DIAGNOSIS — J45.20 MILD INTERMITTENT REACTIVE AIRWAY DISEASE WITHOUT COMPLICATION: ICD-10-CM

## 2021-06-11 DIAGNOSIS — R73.03 PREDIABETES: Primary | ICD-10-CM

## 2021-06-11 DIAGNOSIS — J45.21 MILD INTERMITTENT ASTHMA WITH ACUTE EXACERBATION: ICD-10-CM

## 2021-06-11 DIAGNOSIS — F34.1 DYSTHYMIA: ICD-10-CM

## 2021-06-11 DIAGNOSIS — E78.5 HYPERLIPIDEMIA LDL GOAL <130: ICD-10-CM

## 2021-06-11 DIAGNOSIS — G47.33 OBSTRUCTIVE SLEEP APNEA SYNDROME: ICD-10-CM

## 2021-06-11 DIAGNOSIS — I89.0 LYMPHEDEMA: ICD-10-CM

## 2021-06-11 DIAGNOSIS — I10 ESSENTIAL HYPERTENSION: ICD-10-CM

## 2021-06-11 PROCEDURE — G8754 DIAS BP LESS 90: HCPCS | Performed by: INTERNAL MEDICINE

## 2021-06-11 PROCEDURE — G9717 DOC PT DX DEP/BP F/U NT REQ: HCPCS | Performed by: INTERNAL MEDICINE

## 2021-06-11 PROCEDURE — G8400 PT W/DXA NO RESULTS DOC: HCPCS | Performed by: INTERNAL MEDICINE

## 2021-06-11 PROCEDURE — G8427 DOCREV CUR MEDS BY ELIG CLIN: HCPCS | Performed by: INTERNAL MEDICINE

## 2021-06-11 PROCEDURE — G8536 NO DOC ELDER MAL SCRN: HCPCS | Performed by: INTERNAL MEDICINE

## 2021-06-11 PROCEDURE — G8752 SYS BP LESS 140: HCPCS | Performed by: INTERNAL MEDICINE

## 2021-06-11 PROCEDURE — 1101F PT FALLS ASSESS-DOCD LE1/YR: CPT | Performed by: INTERNAL MEDICINE

## 2021-06-11 PROCEDURE — 99214 OFFICE O/P EST MOD 30 MIN: CPT | Performed by: INTERNAL MEDICINE

## 2021-06-11 PROCEDURE — 1090F PRES/ABSN URINE INCON ASSESS: CPT | Performed by: INTERNAL MEDICINE

## 2021-06-11 PROCEDURE — G8417 CALC BMI ABV UP PARAM F/U: HCPCS | Performed by: INTERNAL MEDICINE

## 2021-06-11 PROCEDURE — G9711 PT HX TOT COL OR COLON CA: HCPCS | Performed by: INTERNAL MEDICINE

## 2021-06-11 NOTE — PROGRESS NOTES
Laura Flores (: 1945) is a 77 y.o. female, established patient, here for evaluation of the following chief complaint(s):  Medication Refill (bp and reflux meds)       ASSESSMENT/PLAN:  Below is the assessment and plan developed based on review of pertinent history, physical exam, labs, studies, and medications. 1. Type 2 diabetes mellitus without complication, without long-term current use of insulin ([address]  Sugars presumed stable, will recheck today. Continue to follow diabetic diet and monitor sugars. 2. Dysthymia  Stable and well-managed. Continue with ongoing regimen of Prozac. 3. Hyperlipidemia LDL goal <130  Presumed stable, will recheck labs today. Patient is tolerating medications with no myalgias. I do not recommend any change in treatment plan (Lipitor). 4. Essential hypertension  BP is at goal. I do not recommend any change in medications (Benicar, Lasix). 5. Obstructive sleep apnea syndrome  Stable and well-managed. Continue with CPAP machine. 6. Lymphedema  I encouraged pt to find some kind of compression socks if she can and recommended PT to help with mobility- she did not like lymphedema clinic in the past and stockings are hard but maybe something that wraps around legs could help    No follow-ups on file. SUBJECTIVE/OBJECTIVE:  HPI     Hypertension ROS: taking medications as instructed, no medication side effects noted, no TIA's, no chest pain on exertion, no dyspnea on exertion, no swelling of ankles. New concerns: BP in office today is 101/66. Pt is followed by Dr. Magaly Dodge. Hyperlipidemia:  Cardiovascular risk analysis - 77 y.o. female LDL goal is under 130. ROS: taking medications as instructed, no medication side effects noted, no TIA's, no chest pain on exertion, no dyspnea on exertion, no swelling of ankles. Tolerating meds, no myalgias or other side effects noted. New concerns: Pt's last LDL was 101 on 21.      Diabetic Review of Systems - medication compliance: compliant all of the time. Other symptoms and concerns: Pt's last a1c was 6.1 on 6/30/20 with an estimated BG of 128    Lymphedema:  Pt notes doing water exercises pre-COVID that helped with mobility but did not solve her lymphedema. Pt states she has not been able to use any sort of compression socks, noting that she went to the lymphedema clinic but was frustrated because the majority of options were not covered. Breathing:  Pt reports taking Symbicort consistently in the morning but sometimes forgets at night. She states she can tell a difference if she takes it consistently 2x/day and takes albuterol if she knows she will be walking longer distances or will be outside. Review of Systems   All other systems reviewed and are negative. Physical Exam  Constitutional:       Appearance: Normal appearance. HENT:      Right Ear: Tympanic membrane and external ear normal.      Left Ear: Tympanic membrane and external ear normal.      Mouth/Throat:      Mouth: Mucous membranes are moist.      Pharynx: Oropharynx is clear. Cardiovascular:      Rate and Rhythm: Normal rate and regular rhythm. Pulses: Normal pulses. Heart sounds: Normal heart sounds. Pulmonary:      Effort: Pulmonary effort is normal.      Breath sounds: Normal breath sounds. Musculoskeletal:         General: Normal range of motion. Skin:     General: Skin is warm and dry. Neurological:      General: No focal deficit present. Mental Status: She is alert and oriented to person, place, and time. Psychiatric:         Mood and Affect: Mood normal.         Behavior: Behavior normal.           On this date 06/11/2021 I have spent 35 minutes reviewing previous notes, test results and face to face with the patient discussing the diagnosis and importance of compliance with the treatment plan as well as documenting on the day of the visit. An electronic signature was used to authenticate this note.   Written by Meet Stark, as dictated by Dr. Dino Boyle MD.  -- Lizzy Mahmood

## 2021-06-25 LAB
ALBUMIN SERPL-MCNC: 3.9 G/DL (ref 3.7–4.7)
ALBUMIN/GLOB SERPL: 1.4 {RATIO} (ref 1.2–2.2)
ALP SERPL-CCNC: 70 IU/L (ref 48–121)
ALT SERPL-CCNC: 42 IU/L (ref 0–32)
AST SERPL-CCNC: 31 IU/L (ref 0–40)
BILIRUB SERPL-MCNC: 0.5 MG/DL (ref 0–1.2)
BUN SERPL-MCNC: 20 MG/DL (ref 8–27)
BUN/CREAT SERPL: 27 (ref 12–28)
CALCIUM SERPL-MCNC: 8.7 MG/DL (ref 8.7–10.3)
CHLORIDE SERPL-SCNC: 103 MMOL/L (ref 96–106)
CHOLEST SERPL-MCNC: 173 MG/DL (ref 100–199)
CO2 SERPL-SCNC: 28 MMOL/L (ref 20–29)
CREAT SERPL-MCNC: 0.73 MG/DL (ref 0.57–1)
ERYTHROCYTE [DISTWIDTH] IN BLOOD BY AUTOMATED COUNT: 12.3 % (ref 11.7–15.4)
EST. AVERAGE GLUCOSE BLD GHB EST-MCNC: 134 MG/DL
GLOBULIN SER CALC-MCNC: 2.7 G/DL (ref 1.5–4.5)
GLUCOSE SERPL-MCNC: 144 MG/DL (ref 65–99)
HBA1C MFR BLD: 6.3 % (ref 4.8–5.6)
HCT VFR BLD AUTO: 39.1 % (ref 34–46.6)
HDLC SERPL-MCNC: 49 MG/DL
HGB BLD-MCNC: 13.1 G/DL (ref 11.1–15.9)
IMP & REVIEW OF LAB RESULTS: NORMAL
LDLC SERPL CALC-MCNC: 98 MG/DL (ref 0–99)
MCH RBC QN AUTO: 31 PG (ref 26.6–33)
MCHC RBC AUTO-ENTMCNC: 33.5 G/DL (ref 31.5–35.7)
MCV RBC AUTO: 93 FL (ref 79–97)
PLATELET # BLD AUTO: 234 X10E3/UL (ref 150–450)
POTASSIUM SERPL-SCNC: 4.6 MMOL/L (ref 3.5–5.2)
PROT SERPL-MCNC: 6.6 G/DL (ref 6–8.5)
RBC # BLD AUTO: 4.22 X10E6/UL (ref 3.77–5.28)
SODIUM SERPL-SCNC: 141 MMOL/L (ref 134–144)
TRIGL SERPL-MCNC: 151 MG/DL (ref 0–149)
VLDLC SERPL CALC-MCNC: 26 MG/DL (ref 5–40)
WBC # BLD AUTO: 7.5 X10E3/UL (ref 3.4–10.8)

## 2021-07-28 DIAGNOSIS — I10 ESSENTIAL HYPERTENSION: ICD-10-CM

## 2021-07-28 DIAGNOSIS — E78.5 HYPERLIPIDEMIA LDL GOAL <130: ICD-10-CM

## 2021-07-28 RX ORDER — OLMESARTAN MEDOXOMIL 20 MG/1
TABLET ORAL
Qty: 90 TABLET | Refills: 0 | Status: SHIPPED | OUTPATIENT
Start: 2021-07-28 | End: 2021-10-27

## 2021-07-28 RX ORDER — ATORVASTATIN CALCIUM 20 MG/1
TABLET, FILM COATED ORAL
Qty: 90 TABLET | Refills: 0 | Status: SHIPPED | OUTPATIENT
Start: 2021-07-28 | End: 2021-10-27

## 2021-10-27 DIAGNOSIS — E78.5 HYPERLIPIDEMIA LDL GOAL <130: ICD-10-CM

## 2021-10-27 DIAGNOSIS — I10 ESSENTIAL HYPERTENSION: ICD-10-CM

## 2021-10-27 RX ORDER — OLMESARTAN MEDOXOMIL 20 MG/1
TABLET ORAL
Qty: 90 TABLET | Refills: 0 | Status: SHIPPED | OUTPATIENT
Start: 2021-10-27 | End: 2022-01-23

## 2021-10-27 RX ORDER — ATORVASTATIN CALCIUM 20 MG/1
TABLET, FILM COATED ORAL
Qty: 90 TABLET | Refills: 0 | Status: SHIPPED | OUTPATIENT
Start: 2021-10-27 | End: 2022-01-23

## 2021-12-01 ENCOUNTER — OFFICE VISIT (OUTPATIENT)
Dept: INTERNAL MEDICINE CLINIC | Age: 76
End: 2021-12-01
Payer: MEDICARE

## 2021-12-01 VITALS
HEART RATE: 81 BPM | SYSTOLIC BLOOD PRESSURE: 116 MMHG | TEMPERATURE: 98.1 F | WEIGHT: 293 LBS | HEIGHT: 59 IN | DIASTOLIC BLOOD PRESSURE: 70 MMHG | OXYGEN SATURATION: 95 % | RESPIRATION RATE: 16 BRPM | BODY MASS INDEX: 59.07 KG/M2

## 2021-12-01 DIAGNOSIS — J20.8 ACUTE BRONCHITIS DUE TO OTHER SPECIFIED ORGANISMS: ICD-10-CM

## 2021-12-01 DIAGNOSIS — G47.33 OBSTRUCTIVE SLEEP APNEA SYNDROME: ICD-10-CM

## 2021-12-01 DIAGNOSIS — E66.01 MORBID OBESITY WITH BMI OF 60.0-69.9, ADULT (HCC): ICD-10-CM

## 2021-12-01 DIAGNOSIS — E78.5 HYPERLIPIDEMIA LDL GOAL <130: ICD-10-CM

## 2021-12-01 DIAGNOSIS — Z00.00 MEDICARE ANNUAL WELLNESS VISIT, SUBSEQUENT: Primary | ICD-10-CM

## 2021-12-01 DIAGNOSIS — I10 ESSENTIAL HYPERTENSION: ICD-10-CM

## 2021-12-01 DIAGNOSIS — E11.9 TYPE 2 DIABETES MELLITUS WITHOUT COMPLICATION, WITHOUT LONG-TERM CURRENT USE OF INSULIN (HCC): ICD-10-CM

## 2021-12-01 DIAGNOSIS — G47.9 SLEEP DISORDER: ICD-10-CM

## 2021-12-01 PROCEDURE — 1090F PRES/ABSN URINE INCON ASSESS: CPT | Performed by: INTERNAL MEDICINE

## 2021-12-01 PROCEDURE — 3044F HG A1C LEVEL LT 7.0%: CPT | Performed by: INTERNAL MEDICINE

## 2021-12-01 PROCEDURE — G8754 DIAS BP LESS 90: HCPCS | Performed by: INTERNAL MEDICINE

## 2021-12-01 PROCEDURE — 99214 OFFICE O/P EST MOD 30 MIN: CPT | Performed by: INTERNAL MEDICINE

## 2021-12-01 PROCEDURE — G9717 DOC PT DX DEP/BP F/U NT REQ: HCPCS | Performed by: INTERNAL MEDICINE

## 2021-12-01 PROCEDURE — 2022F DILAT RTA XM EVC RTNOPTHY: CPT | Performed by: INTERNAL MEDICINE

## 2021-12-01 PROCEDURE — G9711 PT HX TOT COL OR COLON CA: HCPCS | Performed by: INTERNAL MEDICINE

## 2021-12-01 PROCEDURE — G8536 NO DOC ELDER MAL SCRN: HCPCS | Performed by: INTERNAL MEDICINE

## 2021-12-01 PROCEDURE — 1101F PT FALLS ASSESS-DOCD LE1/YR: CPT | Performed by: INTERNAL MEDICINE

## 2021-12-01 PROCEDURE — G8400 PT W/DXA NO RESULTS DOC: HCPCS | Performed by: INTERNAL MEDICINE

## 2021-12-01 PROCEDURE — G8417 CALC BMI ABV UP PARAM F/U: HCPCS | Performed by: INTERNAL MEDICINE

## 2021-12-01 PROCEDURE — G8427 DOCREV CUR MEDS BY ELIG CLIN: HCPCS | Performed by: INTERNAL MEDICINE

## 2021-12-01 PROCEDURE — G8752 SYS BP LESS 140: HCPCS | Performed by: INTERNAL MEDICINE

## 2021-12-01 PROCEDURE — G0439 PPPS, SUBSEQ VISIT: HCPCS | Performed by: INTERNAL MEDICINE

## 2021-12-01 RX ORDER — ECHINACEA 400 MG
CAPSULE ORAL DAILY
COMMUNITY

## 2021-12-01 RX ORDER — DOXYCYCLINE 100 MG/1
100 TABLET ORAL 2 TIMES DAILY
Qty: 20 TABLET | Refills: 0 | Status: SHIPPED | OUTPATIENT
Start: 2021-12-01 | End: 2022-09-26 | Stop reason: ALTCHOICE

## 2021-12-01 RX ORDER — ASCORBIC ACID 500 MG
500 TABLET ORAL DAILY
COMMUNITY

## 2021-12-01 NOTE — PROGRESS NOTES
Alcira Hernandez (: 1945) is a 77 y.o. female, established patient, here for evaluation of the following chief complaint(s):  Cough (w/ mucus for about a week)       ASSESSMENT/PLAN:  Below is the assessment and plan developed based on review of pertinent history, physical exam, labs, studies, and medications. 1. Medicare annual wellness visit, subsequent  2. Type 2 diabetes mellitus without complication, without long-term current use of insulin (HCC)  -     MICROALBUMIN, UR, RAND W/ MICROALB/CREAT RATIO; Future  -     HEMOGLOBIN A1C WITH EAG; Future  -     REFERRAL TO PODIATRY  BG presumed stable, I recommended that she return for blood work when she is well hydrated. I urged her to consult a podiatrist for treatment of her feet. 3. Essential hypertension  -     METABOLIC PANEL, COMPREHENSIVE; Future  BP is at goal. I do not recommend any change in medications. Continue with ongoing regimen of Benicar and Lasix. 4. Hyperlipidemia LDL goal <130  -     LIPID PANEL; Future  Presumed stable, will order labs today to be drawn within the next week. Patient is tolerating medications with no myalgias. I do not recommend any change in treatment plan. Continue with ongoing regimen of Lipitor. 5. Obstructive sleep apnea syndrome  I suggested that the pt continue taking Melatonin gummies PRN and resume sleeping with her CPAP. 6. Acute bronchitis due to other specified organisms  -     doxycycline (ADOXA) 100 mg tablet; Take 1 Tablet by mouth two (2) times a day., Normal, Disp-20 Tablet, R-0  I will rx doxycycline and strongly encouraged her to use Symbicort bi-daily and albuterol PRN. Will continue to monitor for improvements or changes. 7. Sleep disorder  I suggested that the pt continue taking Melatonin gummies PRN and resume sleeping with her CPAP. 8. Morbid obesity with BMI of 60.0-69.9, adult ([facility]  I encouraged the pt to consume a healthy diet. No follow-ups on file.      SUBJECTIVE/OBJECTIVE:  HPI Cough: Pt presents today with c/o cough persisting for a week. She notes that she tested negative for COVID-19 on Friday. Pt tried alleviating her sx with Robitussin without success. She states that she hasn't used her CPAP as of late given the recent recall. Sleep disturbance: Pt reports that melatonin has improved her quality of sleep. Review of Systems   Respiratory: Positive for cough. All other systems reviewed and are negative. Physical Exam  Constitutional:       Appearance: Normal appearance. HENT:      Right Ear: Tympanic membrane and external ear normal.      Left Ear: Tympanic membrane and external ear normal.      Mouth/Throat:      Mouth: Mucous membranes are moist.      Pharynx: Oropharynx is clear. Cardiovascular:      Rate and Rhythm: Normal rate and regular rhythm. Pulses: Normal pulses. Heart sounds: Normal heart sounds. Pulmonary:      Effort: Pulmonary effort is normal.      Breath sounds: Wheezing present. Musculoskeletal:         General: Normal range of motion. Skin:     General: Skin is warm and dry. Neurological:      General: No focal deficit present. Mental Status: She is alert and oriented to person, place, and time. Psychiatric:         Mood and Affect: Mood normal.         Behavior: Behavior normal.     On this date 12/01/2021 I have spent 35 minutes reviewing previous notes, test results and face to face with the patient discussing the diagnosis and importance of compliance with the treatment plan as well as documenting on the day of the visit. An electronic signature was used to authenticate this note. Written by Ty Medel as dictated by Dr. Alondra Cardenas.    -- Ty Medel

## 2021-12-01 NOTE — PROGRESS NOTES
This is the Subsequent Medicare Annual Wellness Exam, performed 12 months or more after the Initial AWV or the last Subsequent AWV    I have reviewed the patient's medical history in detail and updated the computerized patient record. Assessment/Plan   Education and counseling provided:  Are appropriate based on today's review and evaluation    1. Type 2 diabetes mellitus without complication, without long-term current use of insulin ([address]  2. Essential hypertension  3. Hyperlipidemia LDL goal <130  4. Obstructive sleep apnea syndrome  5. Acute bronchitis due to other specified organisms       Depression Risk Factor Screening     3 most recent PHQ Screens 4/10/2017   Little interest or pleasure in doing things Not at all   Feeling down, depressed, irritable, or hopeless Not at all   Total Score PHQ 2 0       Alcohol Risk Screen    Do you average more than 1 drink per night or more than 7 drinks a week:  No    On any one occasion in the past three months have you have had more than 3 drinks containing alcohol:  No        Functional Ability and Level of Safety    Hearing: Hearing is good. Activities of Daily Living: The home contains: no safety equipment. Patient does total self care      Ambulation: with no difficulty     Fall Risk:  Fall Risk Assessment, last 12 mths 12/1/2021   Able to walk? Yes   Fall in past 12 months? 0   Do you feel unsteady? 0   Are you worried about falling 0   Number of falls in past 12 months -   Fall with injury?  -      Abuse Screen:  Patient is not abused       Cognitive Screening    Has your family/caregiver stated any concerns about your memory: no     Cognitive Screening: Normal - MMSE (Mini Mental Status Exam)    Health Maintenance Due     Health Maintenance Due   Topic Date Due    Eye Exam Retinal or Dilated  Never done    Shingrix Vaccine Age 50> (1 of 2) Never done    Bone Densitometry (Dexa) Screening  Never done    MICROALBUMIN Q1  06/30/2021    Medicare Yearly Exam  11/19/2021       Patient Care Team   Patient Care Team:  Alyce Garcia MD as PCP - General (Internal Medicine)  Alyce Garcia MD as PCP - Jono Jolie Martin Provider    History     Patient Active Problem List   Diagnosis Code    Fracture T14. 8XXA    HTN (hypertension) I10    Hyperlipidemia LDL goal <130 E78.5    Dysthymia F34.1    Advanced care planning/counseling discussion Z71.89    Obesity, morbid ([facility] E66.01     Past Medical History:   Diagnosis Date    Asthma     COPD (chronic obstructive pulmonary disease) ([facility]     Diverticulitis     H/O mammogram 07/25/2018    Negative    Hyperlipidemia     Hyperlipidemia LDL goal <130 3/17/2016    Hypertension     Pap smear for cervical cancer screening 05/04/2018    Negative    Psychiatric disorder     Depression      Past Surgical History:   Procedure Laterality Date    HX DILATION AND CURETTAGE  06/2018    Performed by Dr Deanna Rao at [id number]  02/05/2019    Corinn Woodward    HX HEENT      HX HYSTEROSCOPY  02/05/2019    Hysteroscopy, D&C, Myosure. - Benign    HX MENISCECTOMY      left knee    HX TOTAL COLECTOMY      Partial, adhesions, diverticulitis     Current Outpatient Medications   Medication Sig Dispense Refill    zinc 50 mg tab tablet Take 50 mg by mouth daily.  elderberry fruit and flower 460-115 mg cap Take  by mouth daily.  ascorbic acid, vitamin C, (Vitamin C) 500 mg tablet Take 500 mg by mouth daily.  atorvastatin (LIPITOR) 20 mg tablet TAKE 1 TABLET BY MOUTH EVERY DAY 90 Tablet 0    olmesartan (BENICAR) 20 mg tablet TAKE 1 TABLET BY MOUTH EVERY DAY 90 Tablet 0    FLUoxetine (PROzac) 20 mg capsule TAKE 1 CAPSULE BY MOUTH EVERY DAY 90 Capsule 1    albuterol (PROVENTIL VENTOLIN) 2.5 mg /3 mL (0.083 %) nebu by Nebulization route.  cholecalciferol (Vitamin D3) 25 mcg (1,000 unit) cap Take  by mouth daily.       budesonide-formoteroL (SYMBICORT) 160-4.5 mcg/actuation HFAA Take 2 Puffs by inhalation two (2) times a day. 30.6 Inhaler 5    furosemide (LASIX) 40 mg tablet TAKE 1 TAB BY MOUTH TWO (2) TIMES A DAY. CHANGE AFFECTIVE: 1/14/19 (Patient taking differently: Take 20 mg by mouth two (2) times daily as needed.  Change affective: 1/14/19) 180 Tab 0     Allergies   Allergen Reactions    Ciprofloxacin Swelling    Penicillins Swelling     Unsure of reaction    Sulfa (Sulfonamide Antibiotics) Unknown (comments)     She doesn't remember         Family History   Problem Relation Age of Onset    Hypertension Mother     Alzheimer's Disease Mother     Hypertension Father     Cancer Father         Prostate     Social History     Tobacco Use    Smoking status: Never Smoker    Smokeless tobacco: Never Used   Substance Use Topics    Alcohol use: Yes     Comment: glass of wine each day         Judith Sewell MD

## 2021-12-01 NOTE — PATIENT INSTRUCTIONS

## 2021-12-23 DIAGNOSIS — F34.1 DYSTHYMIA: ICD-10-CM

## 2021-12-23 RX ORDER — FLUOXETINE HYDROCHLORIDE 20 MG/1
CAPSULE ORAL
Qty: 90 CAPSULE | Refills: 1 | Status: SHIPPED | OUTPATIENT
Start: 2021-12-23 | End: 2022-06-16

## 2022-01-16 LAB
ALBUMIN SERPL-MCNC: 4.2 G/DL (ref 3.7–4.7)
ALBUMIN/CREAT UR: 10 MG/G CREAT (ref 0–29)
ALBUMIN/GLOB SERPL: 1.6 {RATIO} (ref 1.2–2.2)
ALP SERPL-CCNC: 80 IU/L (ref 44–121)
ALT SERPL-CCNC: 97 IU/L (ref 0–32)
AST SERPL-CCNC: 67 IU/L (ref 0–40)
BILIRUB SERPL-MCNC: 0.7 MG/DL (ref 0–1.2)
BUN SERPL-MCNC: 20 MG/DL (ref 8–27)
BUN/CREAT SERPL: 31 (ref 12–28)
CALCIUM SERPL-MCNC: 9.3 MG/DL (ref 8.7–10.3)
CHLORIDE SERPL-SCNC: 100 MMOL/L (ref 96–106)
CHOLEST SERPL-MCNC: 195 MG/DL (ref 100–199)
CO2 SERPL-SCNC: 26 MMOL/L (ref 20–29)
CREAT SERPL-MCNC: 0.65 MG/DL (ref 0.57–1)
CREAT UR-MCNC: 183.6 MG/DL
EST. AVERAGE GLUCOSE BLD GHB EST-MCNC: 140 MG/DL
GLOBULIN SER CALC-MCNC: 2.7 G/DL (ref 1.5–4.5)
GLUCOSE SERPL-MCNC: 130 MG/DL (ref 65–99)
HBA1C MFR BLD: 6.5 % (ref 4.8–5.6)
HDLC SERPL-MCNC: 58 MG/DL
IMP & REVIEW OF LAB RESULTS: NORMAL
LDLC SERPL CALC-MCNC: 115 MG/DL (ref 0–99)
MICROALBUMIN UR-MCNC: 18.5 UG/ML
POTASSIUM SERPL-SCNC: 5.2 MMOL/L (ref 3.5–5.2)
PROT SERPL-MCNC: 6.9 G/DL (ref 6–8.5)
SODIUM SERPL-SCNC: 139 MMOL/L (ref 134–144)
TRIGL SERPL-MCNC: 124 MG/DL (ref 0–149)
VLDLC SERPL CALC-MCNC: 22 MG/DL (ref 5–40)

## 2022-01-17 DIAGNOSIS — R94.5 ABNORMAL LIVER FUNCTION: Primary | ICD-10-CM

## 2022-03-18 PROBLEM — E66.01 OBESITY, MORBID (HCC): Status: ACTIVE | Noted: 2018-04-10

## 2022-03-19 PROBLEM — Z71.89 ADVANCED CARE PLANNING/COUNSELING DISCUSSION: Status: ACTIVE | Noted: 2017-04-11

## 2022-05-31 ENCOUNTER — TELEPHONE (OUTPATIENT)
Dept: INTERNAL MEDICINE CLINIC | Age: 77
End: 2022-05-31

## 2022-05-31 ENCOUNTER — NURSE TRIAGE (OUTPATIENT)
Dept: OTHER | Facility: CLINIC | Age: 77
End: 2022-05-31

## 2022-05-31 NOTE — TELEPHONE ENCOUNTER
Received call from Alejandro at [facility] with [employer]. Subjective: Caller states \"The chair slid out from under me and I went down. 911 came to help me up and walked me to my car\"     Current Symptoms: upper chest pain since falling out of chair, pain does not radiate    Speaking in complete sentences    Onset: 2 weeks ago;     Denies - new shortness of breath / diaphoresis / difficulty with raising arms        Pain Severity: 6/10; intermittent    Temperature: Denies      What has been tried: Nothing      Recommended disposition: See in Office Today or Tomorrow    Care advice provided, patient verbalizes understanding; denies any other questions or concerns; instructed to call back for any new or worsening symptoms. [facility] Ted not answering due to high call volumes. Teams message sent requesting that an [place] ([facility]) representative call patient for scheduling. Message acknowledged by Dinh Mariee. Attention Provider: Thank you for allowing me to participate in the care of your patient. The patient was connected to triage in response to information provided to the [facility]. Please do not respond through this encounter as the response is not directed to a shared pool.       Reason for Disposition   High-risk adult (e.g., age > 61 years, osteoporosis, chronic steroid use)    Protocols used: CHEST INJURY-ADULT-[place]

## 2022-06-01 ENCOUNTER — OFFICE VISIT (OUTPATIENT)
Dept: INTERNAL MEDICINE CLINIC | Age: 77
End: 2022-06-01
Payer: MEDICARE

## 2022-06-01 VITALS
SYSTOLIC BLOOD PRESSURE: 119 MMHG | HEART RATE: 78 BPM | BODY MASS INDEX: 59.07 KG/M2 | WEIGHT: 293 LBS | TEMPERATURE: 98.2 F | OXYGEN SATURATION: 94 % | RESPIRATION RATE: 16 BRPM | HEIGHT: 59 IN | DIASTOLIC BLOOD PRESSURE: 68 MMHG

## 2022-06-01 DIAGNOSIS — R07.89 OTHER CHEST PAIN: Primary | ICD-10-CM

## 2022-06-01 DIAGNOSIS — E66.01 MORBID OBESITY WITH BMI OF 60.0-69.9, ADULT (HCC): ICD-10-CM

## 2022-06-01 DIAGNOSIS — Z12.11 SCREENING FOR COLON CANCER: ICD-10-CM

## 2022-06-01 DIAGNOSIS — L91.8 SKIN TAGS, MULTIPLE ACQUIRED: ICD-10-CM

## 2022-06-01 DIAGNOSIS — I10 ESSENTIAL HYPERTENSION: ICD-10-CM

## 2022-06-01 DIAGNOSIS — E78.5 HYPERLIPIDEMIA LDL GOAL <130: ICD-10-CM

## 2022-06-01 DIAGNOSIS — E11.9 TYPE 2 DIABETES MELLITUS WITHOUT COMPLICATION, WITHOUT LONG-TERM CURRENT USE OF INSULIN (HCC): ICD-10-CM

## 2022-06-01 DIAGNOSIS — F34.1 DYSTHYMIA: ICD-10-CM

## 2022-06-01 PROCEDURE — G8427 DOCREV CUR MEDS BY ELIG CLIN: HCPCS | Performed by: INTERNAL MEDICINE

## 2022-06-01 PROCEDURE — G8417 CALC BMI ABV UP PARAM F/U: HCPCS | Performed by: INTERNAL MEDICINE

## 2022-06-01 PROCEDURE — 1090F PRES/ABSN URINE INCON ASSESS: CPT | Performed by: INTERNAL MEDICINE

## 2022-06-01 PROCEDURE — G9717 DOC PT DX DEP/BP F/U NT REQ: HCPCS | Performed by: INTERNAL MEDICINE

## 2022-06-01 PROCEDURE — G8536 NO DOC ELDER MAL SCRN: HCPCS | Performed by: INTERNAL MEDICINE

## 2022-06-01 PROCEDURE — G8754 DIAS BP LESS 90: HCPCS | Performed by: INTERNAL MEDICINE

## 2022-06-01 PROCEDURE — G8400 PT W/DXA NO RESULTS DOC: HCPCS | Performed by: INTERNAL MEDICINE

## 2022-06-01 PROCEDURE — 3044F HG A1C LEVEL LT 7.0%: CPT | Performed by: INTERNAL MEDICINE

## 2022-06-01 PROCEDURE — G9711 PT HX TOT COL OR COLON CA: HCPCS | Performed by: INTERNAL MEDICINE

## 2022-06-01 PROCEDURE — 99214 OFFICE O/P EST MOD 30 MIN: CPT | Performed by: INTERNAL MEDICINE

## 2022-06-01 PROCEDURE — 1101F PT FALLS ASSESS-DOCD LE1/YR: CPT | Performed by: INTERNAL MEDICINE

## 2022-06-01 PROCEDURE — 2022F DILAT RTA XM EVC RTNOPTHY: CPT | Performed by: INTERNAL MEDICINE

## 2022-06-01 PROCEDURE — G8752 SYS BP LESS 140: HCPCS | Performed by: INTERNAL MEDICINE

## 2022-06-01 RX ORDER — CHOLECALCIFEROL (VITAMIN D3) 50 MCG
CAPSULE ORAL DAILY
COMMUNITY

## 2022-06-01 NOTE — PROGRESS NOTES
Angel Luis Ortiz (: 1945) is a 77 y.o. female, established patient, here for evaluation of the following chief complaint(s):  Fall (chest is sore after recent fall)       ASSESSMENT/PLAN:  Below is the assessment and plan developed based on review of pertinent history, physical exam, labs, studies, and medications. 1. Other chest pain  I suspect that her sx are muscular in nature, so I recommended that she manage sx with Tylenol BID for approximately a week and schedule routine f/u with Dr. Addie Calderon. 2. Type 2 diabetes mellitus without complication, without long-term current use of insulin (HCC)  BG stable, continue practicing a healthy lifestyle and will continue monitoring. 3. Essential hypertension  BP is at goal. I do not recommend any change in medications (Benicar and Lasix). 4. Morbid obesity with BMI of 60.0-69.9, adult ([facility]  I encouraged her to employ a healthy lifestyle. 5. Hyperlipidemia LDL goal <130  Stable, patient is tolerating medications, no myalgias. I do not recommend any change in medications (Lipitor). 6. Dysthymia  Stable and well-managed. Continue with ongoing regimen of Prozac. 7. Screening for colon cancer   I encouraged her to undergo a routine colonoscopy. 8. Skin tags, multiple acquired   Will refer to dermatology for evaluation and potential removal of skin tags. SUBJECTIVE/OBJECTIVE:  HPI    CP: Pt slipped out of a chair at a water color class two weeks ago and noticed a pain in her chest since the day after. She denies a change in SOB or exercise tolerance. Pt notes worsening and alleviation from different positions, as well as pain while coughing. Prediabetic Review of Systems - diabetic diet compliance: compliant most of the time. Other symptoms and concerns: Pt's last a1c was 6.5 on 22 with an estimated BG of 140.     Hypertension ROS: taking medications as instructed, no medication side effects noted, no TIA's, no chest pain on exertion, no dyspnea on exertion, no swelling of ankles. BP in office today is 119/68. Review of Systems   All other systems reviewed and are negative. Physical Exam  Constitutional:       Appearance: Normal appearance. HENT:      Right Ear: Tympanic membrane and external ear normal.      Left Ear: Tympanic membrane and external ear normal.      Mouth/Throat:      Mouth: Mucous membranes are moist.      Pharynx: Oropharynx is clear. Cardiovascular:      Rate and Rhythm: Normal rate and regular rhythm. Pulses: Normal pulses. Heart sounds: Normal heart sounds. Pulmonary:      Effort: Pulmonary effort is normal.      Breath sounds: Normal breath sounds. Musculoskeletal:         General: Normal range of motion. Skin:     General: Skin is warm and dry. Neurological:      General: No focal deficit present. Mental Status: She is alert and oriented to person, place, and time. Psychiatric:         Mood and Affect: Mood normal.         Behavior: Behavior normal.       On this date 06/01/2022 I have spent 35 minutes reviewing previous notes, test results and face to face with the patient discussing the diagnosis and importance of compliance with the treatment plan as well as documenting on the day of the visit. An electronic signature was used to authenticate this note. Written by Nell Piedra as dictated by Dr. Patsy Jarrett.    -- Nell Piedra

## 2022-06-08 ENCOUNTER — OFFICE VISIT (OUTPATIENT)
Dept: CARDIOLOGY CLINIC | Age: 77
End: 2022-06-08
Payer: MEDICARE

## 2022-06-08 VITALS
SYSTOLIC BLOOD PRESSURE: 134 MMHG | HEART RATE: 90 BPM | DIASTOLIC BLOOD PRESSURE: 80 MMHG | BODY MASS INDEX: 59.07 KG/M2 | OXYGEN SATURATION: 95 % | HEIGHT: 59 IN | WEIGHT: 293 LBS

## 2022-06-08 DIAGNOSIS — R60.0 BILATERAL LEG EDEMA: ICD-10-CM

## 2022-06-08 DIAGNOSIS — I10 ESSENTIAL HYPERTENSION: Primary | ICD-10-CM

## 2022-06-08 DIAGNOSIS — E66.9 OBESITY, UNSPECIFIED CLASSIFICATION, UNSPECIFIED OBESITY TYPE, UNSPECIFIED WHETHER SERIOUS COMORBIDITY PRESENT: ICD-10-CM

## 2022-06-08 PROCEDURE — 1123F ACP DISCUSS/DSCN MKR DOCD: CPT | Performed by: INTERNAL MEDICINE

## 2022-06-08 PROCEDURE — 1101F PT FALLS ASSESS-DOCD LE1/YR: CPT | Performed by: INTERNAL MEDICINE

## 2022-06-08 PROCEDURE — G9717 DOC PT DX DEP/BP F/U NT REQ: HCPCS | Performed by: INTERNAL MEDICINE

## 2022-06-08 PROCEDURE — G9711 PT HX TOT COL OR COLON CA: HCPCS | Performed by: INTERNAL MEDICINE

## 2022-06-08 PROCEDURE — G8400 PT W/DXA NO RESULTS DOC: HCPCS | Performed by: INTERNAL MEDICINE

## 2022-06-08 PROCEDURE — G8754 DIAS BP LESS 90: HCPCS | Performed by: INTERNAL MEDICINE

## 2022-06-08 PROCEDURE — G8417 CALC BMI ABV UP PARAM F/U: HCPCS | Performed by: INTERNAL MEDICINE

## 2022-06-08 PROCEDURE — 99214 OFFICE O/P EST MOD 30 MIN: CPT | Performed by: INTERNAL MEDICINE

## 2022-06-08 PROCEDURE — G8752 SYS BP LESS 140: HCPCS | Performed by: INTERNAL MEDICINE

## 2022-06-08 PROCEDURE — G8536 NO DOC ELDER MAL SCRN: HCPCS | Performed by: INTERNAL MEDICINE

## 2022-06-08 PROCEDURE — 1090F PRES/ABSN URINE INCON ASSESS: CPT | Performed by: INTERNAL MEDICINE

## 2022-06-08 PROCEDURE — G8428 CUR MEDS NOT DOCUMENT: HCPCS | Performed by: INTERNAL MEDICINE

## 2022-06-08 NOTE — PROGRESS NOTES
Office Follow-up    NAME: Bushra Rodriguez   :  1945  MRM:  [id number]    Date:  2022            Assessment:     Problem List  Date Reviewed: 2020          Codes Class Noted    Type 2 diabetes mellitus without complication, without long-term current use of insulin ([facility] ICD-10-CM: E11.9  ICD-9-CM: 250.00  2022        Obesity, morbid ([facility] [address] ICD-10-CM: E66.01  ICD-9-CM: 278.01  4/10/2018        Advanced care planning/counseling discussion ICD-10-CM: Z71.89  ICD-9-CM: V65.49  2017    Overview Addendum 2017 10:29 AM by Scar Bliss RN     NN discussed with patient about an Advanced Medical Directive. Provided patient blank Advanced Medical Directive and 'Your Right to Decide' Booklet. NN reviewed Advanced Medical Directive paperwork with patient with a brief description of how to complete the form. Requested that if document completed, to please provide a copy of completed Advanced Medical Directive to PCP office. Patient verbalized understanding. Dysthymia ICD-10-CM: F34.1  ICD-9-CM: 300.4  2016        HTN (hypertension) (Chronic) ICD-10-CM: I10  ICD-9-CM: 401.9  3/17/2016        Hyperlipidemia LDL goal <130 (Chronic) ICD-10-CM: E78.5  ICD-9-CM: 272.4  3/17/2016        Fracture ICD-10-CM: T14. 8XXA  ICD-9-CM: 829.0  2016                 Plan:     1. Chest pain: From pulled muscle after fall. Advise to use local ice and local Volteran get. 2. Lower extremity edema: Multifactorial with primarily obesity and lymphedema causing the edema. Continue compression stockings if possible. Continue Lasix as needed. Increase mobility. Lose weight. 3. Shortness of breath on exertion: This is multifactorial including obesity, sleep apnea. Continue using CPAP. 4. Hypertension: Blood pressure is controlled. Continue current medications. 5. Dyslipidemia: Continue Lipitor. 6. Morbid Obesity: Diet and exercise. 7. LISETH: Inconsistent CPAP use.    8. See me again in 1 year.                     Subjective: Samantha Auguste, a 77y.o. year-old who presents for followup. She has known history of hypertension, sleep apnea, bilateral lower extremity lymphedema, obesity, is here for 6 months follow-up. Currently she is not using her CPAP machine. She fell at the water color classes. Has had chest pain since then which is likely due to muscle pulled. Exam:     Physical Exam:  Visit Vitals  Ht 4' 11\" (1.499 m)   BMI 61.36 kg/m²     General appearance - alert, well appearing, and in no distress  Mental status - affect appropriate to mood  Eyes - sclera anicteric, moist mucous membranes  Neck - supple, no significant adenopathy  Chest - clear to auscultation, no wheezes, rales or rhonchi  Heart - normal rate, regular rhythm, normal S1, S2, no murmurs, rubs, clicks or gallops  Abdomen - soft, nontender, nondistended, no masses or organomegaly  Extremities - peripheral pulses normal,2-3+ pedal edema  Skin - normal coloration  no rashes    Medications:     Current Outpatient Medications   Medication Sig    B.animalis,bifid,infantis,long (Probiotic 4X) 10-15 mg TbEC Take  by mouth daily.  olmesartan (BENICAR) 20 mg tablet TAKE 1 TABLET BY MOUTH EVERY DAY    atorvastatin (LIPITOR) 20 mg tablet TAKE 1 TABLET BY MOUTH EVERY DAY    FLUoxetine (PROzac) 20 mg capsule TAKE 1 CAPSULE BY MOUTH EVERY DAY    zinc 50 mg tab tablet Take 50 mg by mouth daily.  elderberry fruit and flower 460-115 mg cap Take  by mouth daily.  ascorbic acid, vitamin C, (Vitamin C) 500 mg tablet Take 500 mg by mouth daily.  doxycycline (ADOXA) 100 mg tablet Take 1 Tablet by mouth two (2) times a day. (Patient not taking: Reported on 6/1/2022)    albuterol (PROVENTIL VENTOLIN) 2.5 mg /3 mL (0.083 %) nebu by Nebulization route.  cholecalciferol (Vitamin D3) 25 mcg (1,000 unit) cap Take  by mouth daily.     budesonide-formoteroL (SYMBICORT) 160-4.5 mcg/actuation HFAA Take 2 Puffs by inhalation two (2) times a day.  furosemide (LASIX) 40 mg tablet TAKE 1 TAB BY MOUTH TWO (2) TIMES A DAY. CHANGE AFFECTIVE: 1/14/19 (Patient not taking: Reported on 6/1/2022)     No current facility-administered medications for this visit. Diagnostic Data Review:       No specialty comments available. Lab Review:     Lab Results   Component Value Date/Time    Cholesterol, total 195 01/14/2022 01:32 PM    HDL Cholesterol 58 01/14/2022 01:32 PM    LDL, calculated 115 (H) 01/14/2022 01:32 PM    LDL, calculated 101 (H) 06/30/2020 12:33 PM    Triglyceride 124 01/14/2022 01:32 PM     Lab Results   Component Value Date/Time    Creatinine (POC) 0.7 02/19/2016 07:44 AM    Creatinine 0.65 01/14/2022 01:32 PM     Lab Results   Component Value Date/Time    BUN 20 01/14/2022 01:32 PM    BUN (POC) 30 (H) 02/19/2016 07:44 AM     Lab Results   Component Value Date/Time    Potassium 5.2 01/14/2022 01:32 PM     Lab Results   Component Value Date/Time    Hemoglobin A1c 6.5 (H) 01/14/2022 01:32 PM     Lab Results   Component Value Date/Time    Hemoglobin (POC) 12.9 02/19/2016 07:44 AM    HGB 13.1 06/24/2021 10:44 AM     Lab Results   Component Value Date/Time    PLATELET 569 75/36/2865 10:44 AM     No results for input(s): CPK, CKMB, TROIQ in the last 72 hours. No lab exists for component: CKQMB, CPKMB             ___________________________________________________    Damon Conley.  Jil Brewer MD, [facility]

## 2022-06-08 NOTE — PATIENT INSTRUCTIONS
Icepack on the chest wall. Volteran gel or IcyHot gel on the chest wall. See Dr. Dao Patton in 1 year.

## 2022-06-08 NOTE — PROGRESS NOTES
All orders entered per verbal orders of Dr. Rubens Buckner. MD Teresa  Icepack on the chest wall. Volteran gel or IcyHot gel on the chest wall. See Dr. Indigo Fonseca in 1 year.

## 2022-06-08 NOTE — PROGRESS NOTES
Chief Complaint   Patient presents with    Follow-up     for fall having CP     Chest Pain    Hypertension    Cholesterol Problem       Vitals:    06/08/22 1540   BP: 134/80   Pulse: 90   Height: 4' 11\" (1.499 m)   Weight: 306 lb 3.2 oz (138.9 kg)   SpO2: 95%         Chest pain: no just pain from the fall in the chest area Pt stated. SOB: yes, when being active. Palpitations: no    Dizziness: no    Swelling: yes, B legs    Refills:       1. Have you been to the ER, urgent care clinic since your last visit? Hospitalized since your last visit? no    2. Have you sen or consulted other health care providers outside of the [facility] system since your last visit?  (Include any pap smears or colon screening.)

## 2022-06-16 DIAGNOSIS — F34.1 DYSTHYMIA: ICD-10-CM

## 2022-06-16 RX ORDER — FLUOXETINE HYDROCHLORIDE 20 MG/1
CAPSULE ORAL
Qty: 90 CAPSULE | Refills: 1 | Status: SHIPPED | OUTPATIENT
Start: 2022-06-16

## 2022-06-27 NOTE — PROGRESS NOTES
[address], [address]        OUTPATIENT physical Therapy Evaluation with CMS G codes    NAME: Mesha Lobo AGE: 68 y.o. GENDER: female  DATE: 2/27/2019  REFERRING PHYSICIAN: Michael Donohue MD (cardiology)  HISTORY AND BACKGROUND:  Pt is a 69 yo female referred to Lymphedema clinic by her cardiologist for evaluation and treatment of bilateral lower extremity swelling. Pt reports swelling \"for many years\" but says it has been worse in the last 6 months. Pt says she was referred for a doppler and ECHO, both normal. Pt says MD increased her lasix, but she doesn't notice that it has helped. Pt has never worn compression on her legs. PMH significant for COPD with SOB, HTN, s/p colectomy, s/p left UE fracture proximal radius 3 years ago from a fall. Pt denies history of cellulitis or DVT. Primary Diagnosis:  · B LE lymphedema, secondary (I89.0)  Other Treatment Diagnoses:  · Abnormality of gait (R26.9)  · Swelling not relieved by elevation (R60.9)  · Morbid obesity (E66.01)  Date of Onset: reports swelling for \"many years,\" but worse in the last 6 months   Present Symptoms and Functional Limitations: bilateral lower extremity swelling, difficulty wearing normal clothes/shoes (wears slippers), LE pain, impaired balance  Lymphedema Life Impact Scale: Score of 41 and impairment percentage of 60%. See scanned document.    Past Medical History:   Past Medical History:   Diagnosis Date    Asthma     COPD (chronic obstructive pulmonary disease) ([facility] [address]     Diverticulitis     H/O mammogram 07/25/2018    Negative    Hyperlipidemia     Hyperlipidemia LDL goal <130 3/17/2016    Hypertension     Pap smear for cervical cancer screening 05/04/2018    Negative    Psychiatric disorder     Depression     Past Surgical History:   Procedure Laterality Date    HX COLECTOMY [place] for handicap parking Partial, adhesions, diverticulitis    HX DILATION AND CURETTAGE  06/2018    Performed by Dr Stephania Rose at [facility] [id number]  02/05/2019    Vennie Lowers    HX HEENT      HX MENISCECTOMY      left knee     Current Medications:    Current Outpatient Medications   Medication Sig    furosemide (LASIX) 40 mg tablet Take 1 Tab by mouth two (2) times a day. Change affective: 1/14/19    olmesartan (BENICAR) 20 mg tablet TAKE 1 TAB BY MOUTH DAILY.  atorvastatin (LIPITOR) 20 mg tablet TAKE 1 TABLET BY MOUTH EVERY DAY    FLUoxetine (PROZAC) 20 mg capsule TAKE 1 CAPSULE BY MOUTH EVERY DAY    megestrol (MEGACE) 20 mg tablet Take 1 Tab by mouth daily. Pt can increase to 2tab po every day if still has bleeding    esomeprazole (NEXIUM) 40 mg capsule Take 1 Cap by mouth daily for 90 days. No current facility-administered medications for this encounter. Allergies: Allergies   Allergen Reactions    Ciprofloxacin Swelling    Penicillins Swelling     Unsure of reaction    Sulfa (Sulfonamide Antibiotics) Unknown (comments)     She doesn't remember        Social/Work History and Prior Level of Function:   Living Situation: lives alone in apartment, says she moved to [place] from [place] 3 years ago to be closer to her son. Trainable Caregiver?: no    Self-care/ADLs: mod I     Mobility: ambulates with cane for longer distances   Sleeping Arrangement:  Sleeps in regular bed    Adaptive Equipment Owned: SPC, shower chair, BSC over toilet, grab bars   Other: pt is retired, but very active. Pt says she takes her dog for short walks, takes classes at [employer], member of [employer]  Previous Therapy:  None for lymphedema   Compression/Lymphedema Equipment:  None     SUBJECTIVE: \"I'm pretty active so I don't want this swelling to slow me down. I've gained a lot of weight since I moved to [place] because of lifestyle changes and I'm sure that doesn't help things. \" Patients goals for therapy: reduce swelling, improve mobility/balance, learn how to manage lymphedema     EVALUATION AND OBJECTIVE DATA SUMMARY:   Pain:   5/10 bilateral LEs                               Self-Care and ADLs:    Grooming: Independent  Bathing: Modified independent    UB Dressing: Independent  LB Dressing: Modified independent    Don/Doff Shoes/Socks: Modified independent  Toileting: Modified independent    Other:     Skin and Tissue Assessment:  Dermal Status:  [x]   Intact [x]  Dry   []  Tenuous [] Flaky   []  Wound/lesion present []  Scars   []  Dermatitis    Texture/Consistency:  []  Boggy [x]  Pitting Edema - 3+ bilateral lower legs   []  Brawny []  Combination   []  Fibrotic/Woody    Pigmentation/Color Change:  []  Normal []  Hemosiderin   []  Red []  Erythematous   [x]  Hyperpigmented []  Hyperlipodermatosclerosis   Anomalies:  []  Lymphorrhea []  Vesicles   []  Petechiae []  Warty Vercusis   []  Bullae []  Papilloma   Circulatory:  []  LUCRECIA []  Varicosities:   []  Pulses [x]  Vascular studies ruled out DVT in past - reports negative doppler B LEs in summer 2018   []  DVT History    Nails:  [x]   Normal  []   Fungus    Stemmers Sign: negative   Height:   4'11\"  Weight:   293 lbs   BMI:   59.2  (36 or greater: adversely affecting lymphedema)  Wound/Ulcer:  None       Wound Pain:   N/a   Volumetric Measurements:   Right:  15,580.8 mL Left:  13,673.1 mL   % Difference: 13.95% Dominance: R>L   (See scanned graph)  Range of Motion: limited left UE - approximately 120 deg shoulder flexion, B LEs WFL  Strength: WFL    Sensation:  Intact  Mobility:  Modified independence with occasional use of cane. Pt arrives to appt ambulatory without AD. Safety:  Patient is alert and oriented:  Yes, x 4   Safety awareness:  Good    Fall Risk?:  Yes, moderate due to LE edema and impaired balance.     Patient given written fall prevention handout: Yes   Precautions:  Standard lymphedema precautions to include avoiding blood pressure readings, injections and IVs or other procedures/acts that could lead to broken skin on affected area, and avoiding excessive heat, resistive activity or altitude without compression garment    Evaluation Time: 8:45 - 9:15 am (30 minutes)    TREATMENT PROVIDED:   Treatment time:  9:15 - 9:55 am  Minutes: 40  1. Treatment description:  Therapeutic Activity x 2  The patient was educated regarding the role and function of the lymphatic system, and instructed in the lymphedema management protocol of complete decongestive therapy (CDT). This includes skin care to prevent breakdown or infection, lymphedema exercises, custom compression therapy options (bandaging, compression garments, compression pump, Soo Splinter, [employer], [employer]uan, etc. as needed), and decongestion with manual lymphatic drainage as indicated. We discussed the need for consistent compression system for lymphedema management. Diaphragmatic breathing instruction and skin care education was performed, applying low pH lotion to extremity using upward strokes to stimulate lymphatic vessels. Discussed plan of care with recommendations to initiate phase I CDT including bilateral knee high MLB to reduce limb volumes prior to measuring for long-term compression garments. Pt would require custom flat knit stockings vs velcro compression system. Pt lives alone and would need to be independent don/doffing garments. Cost will be a consideration as well. Pt was given information regarding obtaining bandaging supplies. Pt says she needed to think about it and discuss with her son before committing to bandaging. Pt concerned about further impaired mobility with driving, etc. Pt to contact clinic when she has made a decision and will schedule appts and proceed with bandaging order. If pt chooses to not bandage, would likely benefit from velcro compression system such as farrow wrap to allow her to reduce some in garments.  Pt has significant foot/ankle involvement and may require nighttime garment as well. Pt provided with handouts today regarding Lymphedema diagnosis, treatment, and risk factors. Treatment time:  9:55 - 10:15 am  Minutes: 20  2. Treatment description:  Manual Therapy x 1  Patient instructed in skin care principles and anatomy of the lymphatic system. Encouraged daily skin care routine using upward strokes to stimulate lymphatic vessels when applying lotion. Discussed weight management and encouraged exercise routine/weight loss program to aid in lymphedema management. ASSESSMENT:   Juan J Russo is a 68 y.o. female who presents with stage II secondary lymphedema involving bilateral lower extremities with significant involvement around lower leg and foot/ankle. Patient will benefit from complete decongestive therapy (CDT) including manual lymphatic drainage (MLD) technique, short-stretch textile bandages/compression system to decongest limb, and kinesiotaping as appropriate. Patient will receive instruction in proper skin care to recognize signs/symptoms of and prevent infection, therapeutic exercise, and self-MLD for independent home program and restorative lymphatic performance. Discussed plan of care with recommendations to initiate phase I CDT including bilateral knee high MLB to reduce limb volumes prior to measuring for long-term compression garments. Pt would require custom flat knit stockings vs velcro compression system. Pt lives alone and would need to be independent don/doffing garments. Cost will be a consideration as well. Pt says she needed to think about it and discuss with her son before committing to bandaging. Pt concerned about further impaired mobility with driving, etc. Pt to contact clinic when she has made a decision and will schedule appts and proceed with bandaging order.  If pt chooses to not bandage, would likely benefit from velcro compression system such as farrow wrap to allow her to reduce some in garments. Pt has significant foot/ankle involvement and may require nighttime garment as well. Pt provided with handouts today regarding Lymphedema diagnosis, treatment, and risk factors. This care is medically necessary due to the infection risk with lymphedema and to improve functional activities. CDT is necessary to resolve swelling to allow patient to return to wearing normal clothes/footwear and prevent worsening of symptoms such as venous stasis ulcerations, infections, or hospitalizations. Patient will be independent with home program strategies to allow improved ADL ability and mobility and to allow patient to return to greatest functional independence. Rehabilitation potential is considered to be Fair. Factors which may influence rehabilitation potential include lack of caregiver support, cost of garments. Patient will benefit from 2x/week physical therapy visits over 8-12 weeks to optimize improvement in these areas. PLAN OF CARE:   Recommendations and Planned Interventions:  Manual lymph drainage/complete decongestive therapy  Multi-layer compression bandaging (short-stretch)  Compression garment fitting/provision  Lymphedema therapeutic exercise  AROM/PROM/Strength/Coordination  Self-care training  Functional mobility training  Education in skin care and lymphedema precautions  Self-MLD education per home program  Self-bandaging education per home program  Caregiver education as needed  Wound care as needed  Sequential compression device as indicated      GOALS  Short term goals  Time frame: 6 weeks   1. Patient will demonstrate knowledge of signs/symptoms of infections/cellulitis and be independent in skin care to prevent cellulitis. 2.  Patient will demonstrate independence in lymphedema home program of therapeutic exercises to improve circulation and decongest limb to improve ADLs.   3.  Patient will tolerate multi-layer bandages (MLB) and show measureable decrease in limb volume by 500-1,000 mL bilateral lower extremities to allow ordering of home compression system (daytime, nighttime garments and pump as needed). Long term goals  Time frame: 12 weeks   1. Pt will show improvement in Lymphedema Life Impact Scale by 10 points for improved quality of life and to allow pt to fit into regular shoes/pants. 2.  Patient/caregiver will be independent with don/doff of compression system and use in order to prevent reaccumulation of fluid at discharge. 3.  Pt will be independent in self-MLD and show stable limb volumes showing decongestion and pt. ready for transition to independent restorative phase of lymphedema therapy. Physical Therapy Evaluation Charge Determination   History Examination Presentation Decision-Making   HIGH Complexity :3+ comorbidities / personal factors will impact the outcome/ POC  MEDIUM Complexity : 3 Standardized tests and measures addressing body structure, function, activity limitation and / or participation in recreation  MEDIUM Complexity : Evolving with changing characteristics  Other outcome measures LLIS  MEDIUM      Based on the above components, the patient evaluation is determined to be of the following complexity level: MEDIUM      Patient has participated in goal setting and agrees to work toward plan of care. Patient was instructed to call if questions or concerns arise. Thank you for this referral.  Jasmyne Higuera, PT, DPT, CLT   Time Calculation: 90 mins    TREATMENT PLAN EFFECTIVE DATES:   2/27/2019 TO 5/22/2019  I have read the above plan of care for [facility]. I certify the above prescribed services are required by this patient and are medically necessary.   The above plan of care has been developed in conjunction with the lymphedema/physical therapist.       Physician Signature: ____________________________________Date:______________

## 2022-07-22 DIAGNOSIS — I10 ESSENTIAL HYPERTENSION: ICD-10-CM

## 2022-07-22 DIAGNOSIS — E78.5 HYPERLIPIDEMIA LDL GOAL <130: ICD-10-CM

## 2022-07-22 RX ORDER — ATORVASTATIN CALCIUM 20 MG/1
TABLET, FILM COATED ORAL
Qty: 90 TABLET | Refills: 1 | Status: SHIPPED | OUTPATIENT
Start: 2022-07-22

## 2022-07-22 RX ORDER — OLMESARTAN MEDOXOMIL 20 MG/1
TABLET ORAL
Qty: 90 TABLET | Refills: 1 | Status: SHIPPED | OUTPATIENT
Start: 2022-07-22

## 2022-09-26 ENCOUNTER — TELEPHONE (OUTPATIENT)
Dept: INTERNAL MEDICINE CLINIC | Age: 77
End: 2022-09-26

## 2022-09-26 ENCOUNTER — VIRTUAL VISIT (OUTPATIENT)
Dept: INTERNAL MEDICINE CLINIC | Age: 77
End: 2022-09-26
Payer: MEDICARE

## 2022-09-26 DIAGNOSIS — I10 ESSENTIAL HYPERTENSION: ICD-10-CM

## 2022-09-26 DIAGNOSIS — J45.20 MILD INTERMITTENT ASTHMA, UNSPECIFIED WHETHER COMPLICATED: ICD-10-CM

## 2022-09-26 DIAGNOSIS — U07.1 COVID-19: Primary | ICD-10-CM

## 2022-09-26 DIAGNOSIS — E66.01 MORBID OBESITY WITH BMI OF 60.0-69.9, ADULT (HCC): ICD-10-CM

## 2022-09-26 DIAGNOSIS — E78.5 HYPERLIPIDEMIA LDL GOAL <130: ICD-10-CM

## 2022-09-26 PROCEDURE — 1101F PT FALLS ASSESS-DOCD LE1/YR: CPT | Performed by: INTERNAL MEDICINE

## 2022-09-26 PROCEDURE — G8756 NO BP MEASURE DOC: HCPCS | Performed by: INTERNAL MEDICINE

## 2022-09-26 PROCEDURE — G8400 PT W/DXA NO RESULTS DOC: HCPCS | Performed by: INTERNAL MEDICINE

## 2022-09-26 PROCEDURE — G9711 PT HX TOT COL OR COLON CA: HCPCS | Performed by: INTERNAL MEDICINE

## 2022-09-26 PROCEDURE — 1090F PRES/ABSN URINE INCON ASSESS: CPT | Performed by: INTERNAL MEDICINE

## 2022-09-26 PROCEDURE — G8427 DOCREV CUR MEDS BY ELIG CLIN: HCPCS | Performed by: INTERNAL MEDICINE

## 2022-09-26 PROCEDURE — 99214 OFFICE O/P EST MOD 30 MIN: CPT | Performed by: INTERNAL MEDICINE

## 2022-09-26 PROCEDURE — G9717 DOC PT DX DEP/BP F/U NT REQ: HCPCS | Performed by: INTERNAL MEDICINE

## 2022-09-26 NOTE — TELEPHONE ENCOUNTER
Pt is calling states she just tested positive for covid. Pt wanted to know if she could get the covid medication.  Please advise

## 2022-09-26 NOTE — TELEPHONE ENCOUNTER
Spoke with patient and she states that she started Friday with congestion and cough. Occasional headache, diarrhea today with nausea. Advised ismatetn Dr. Mata Restrepo has only been recommending antiviral treatment for patients with severe symptoms and explained the side effects. Patient feels like she is a candidate for treatment with her co morbidities. Provided virtual appointment today with Dr. Mata Restrepo to discuss. Pt understood and was thankful for the call.

## 2022-09-26 NOTE — PROGRESS NOTES
Shabbir Ayers (: 1945) is a 77 y.o. female, established patient, here for evaluation of the following chief complaint(s):   Positive For Covid-19       ASSESSMENT/PLAN:  Below is the assessment and plan developed based on review of pertinent history, labs, studies, and medications. 1. COVID-19-she is not having shortness of breath different than baseline no chest tightness but does have a cough and some diarrhea. We sent in the medicine and she is going to wait another 24 hours to decide whether to take it. She was warned of the side effects of nausea diarrhea and metallic taste and rebound COVID. She was told to hold her statin for 2 weeks and monitor her blood pressure closely as it can cause a drop in blood pressure  -     nirmatrelvir-ritonavir (Paxlovid, EUA,) 300 mg (150 mg x 2)-100 mg; 3 tabs bid for 5 days, Normal, Disp-1 Box, R-0  2. Morbid obesity with BMI of 60.0-69.9, adult (HCC)-cont working on mobility and activity level  3. Essential hypertension-at goal goal but may need to monitor if starts on Paxil but continue Benicar 20 for now  4. Hyperlipidemia LDL goal <130-hold her atorvastatin if she gets on the Paxil but  5. Mild intermittent asthma, unspecified whether complicated-continue with Symbicort and use nebs as needed for chest tightness    No follow-ups on file. SUBJECTIVE/OBJECTIVE:  HPI    Patient has COVID- took home test and tested positive   Her asthma has been well controlled she continues to take her Symbicort    Subjective: Shabbir Ayers is a 77 y.o. female with hypertension. Hypertension ROS: taking medications as instructed, no medication side effects noted, no TIA's, no chest pain on exertion, no dyspnea on exertion, no swelling of ankles. New concerns: stable at home. Subjective: Shabbir Ayers is a 77 y.o. female with hyperlipidemia. Cardiovascular risk analysis - 77 y.o. female LDL goal is under 100.    ROS: taking medications as instructed, no medication side effects noted, no TIA's, no chest pain on exertion, no dyspnea on exertion, no swelling of ankles. Tolerating meds, no myalgias or other side effects noted  New concerns: will need to hold meds if gets on it .       Lab Results   Component Value Date/Time    Cholesterol, total 195 01/14/2022 01:32 PM    HDL Cholesterol 58 01/14/2022 01:32 PM    LDL, calculated 115 (H) 01/14/2022 01:32 PM    LDL, calculated 101 (H) 06/30/2020 12:33 PM    VLDL, calculated 22 01/14/2022 01:32 PM    VLDL, calculated 17 06/30/2020 12:33 PM    Triglyceride 124 01/14/2022 01:32 PM        Review of Systems     Patient-Reported Systolic (Top): 0 (patient doesnt know her blood pressure)  Patient-Reported Temperature: -- (patient doesnt know her temperature)  Patient-Reported Weight: 305 lb         Physical Exam    [INSTRUCTIONS:  \"[x]\" Indicates a positive item  \"[]\" Indicates a negative item  -- DELETE ALL ITEMS NOT EXAMINED]    Constitutional: [x] Appears well-developed and well-nourished [x] No apparent distress      [] Abnormal -     Mental status: [x] Alert and awake  [x] Oriented to person/place/time [x] Able to follow commands    [] Abnormal -     Eyes:   EOM    [x]  Normal    [] Abnormal -   Sclera  [x]  Normal    [] Abnormal -          Discharge [x]  None visible   [] Abnormal -     HENT: [x] Normocephalic, atraumatic  [] Abnormal -   [x] Mouth/Throat: Mucous membranes are moist    External Ears [x] Normal  [] Abnormal -    Neck: [x] No visualized mass [] Abnormal -     Pulmonary/Chest: [x] Respiratory effort normal   [x] No visualized signs of difficulty breathing or respiratory distress        [] Abnormal -      Musculoskeletal:   [x] Normal gait with no signs of ataxia         [x] Normal range of motion of neck        [] Abnormal -     Neurological:        [x] No Facial Asymmetry (Cranial nerve 7 motor function) (limited exam due to video visit)          [x] No gaze palsy        [] Abnormal -          Skin: [x] No significant exanthematous lesions or discoloration noted on facial skin         [] Abnormal -            Psychiatric:       [x] Normal Affect [] Abnormal -        [x] No Hallucinations    Other pertinent observable physical exam findings:-    On this date 09/26/2022 I have spent 30 minutes reviewing previous notes, test results and face to face (virtual) with the patient discussing the diagnosis and importance of compliance with the treatment plan as well as documenting on the day of the visit. Delsie Cramp, was evaluated through a synchronous (real-time) audio-video encounter. The patient (or guardian if applicable) is aware that this is a billable service, which includes applicable co-pays. This Virtual Visit was conducted with patient's (and/or legal guardian's) consent. The visit was conducted pursuant to the emergency declaration under the [address] [place] [address], [address] [place] [address] authority and the "[employer] and [employer] General Act. Patient identification was verified, and a caregiver was present when appropriate. The patient was located at: Home: [address] [place], [place] [id number]  The provider was located at: Facility (Appt Department): [address]       An electronic signature was used to authenticate this note.   -- Pierce Alvarado MD

## 2023-02-04 DIAGNOSIS — E78.5 HYPERLIPIDEMIA LDL GOAL <130: ICD-10-CM

## 2023-02-04 DIAGNOSIS — I10 ESSENTIAL HYPERTENSION: ICD-10-CM

## 2023-02-04 RX ORDER — ATORVASTATIN CALCIUM 20 MG/1
TABLET, FILM COATED ORAL
Qty: 90 TABLET | Refills: 1 | Status: SHIPPED | OUTPATIENT
Start: 2023-02-04

## 2023-02-04 RX ORDER — OLMESARTAN MEDOXOMIL 20 MG/1
TABLET ORAL
Qty: 90 TABLET | Refills: 1 | Status: SHIPPED | OUTPATIENT
Start: 2023-02-04

## 2023-04-03 DIAGNOSIS — F34.1 DYSTHYMIA: ICD-10-CM

## 2023-04-03 RX ORDER — FLUOXETINE HYDROCHLORIDE 20 MG/1
CAPSULE ORAL
Qty: 90 CAPSULE | Refills: 0 | Status: SHIPPED | OUTPATIENT
Start: 2023-04-03

## 2023-07-14 DIAGNOSIS — F34.1 DYSTHYMIC DISORDER: ICD-10-CM

## 2023-07-14 RX ORDER — FLUOXETINE HYDROCHLORIDE 20 MG/1
CAPSULE ORAL
Qty: 30 CAPSULE | Refills: 0 | Status: SHIPPED | OUTPATIENT
Start: 2023-07-14 | End: 2023-08-09

## 2023-08-08 DIAGNOSIS — E78.5 HYPERLIPIDEMIA, UNSPECIFIED: ICD-10-CM

## 2023-08-08 DIAGNOSIS — I10 ESSENTIAL (PRIMARY) HYPERTENSION: ICD-10-CM

## 2023-08-08 RX ORDER — OLMESARTAN MEDOXOMIL 20 MG/1
TABLET ORAL
Qty: 30 TABLET | Refills: 0 | Status: SHIPPED | OUTPATIENT
Start: 2023-08-08

## 2023-08-08 RX ORDER — ATORVASTATIN CALCIUM 20 MG/1
TABLET, FILM COATED ORAL
Qty: 30 TABLET | Refills: 0 | Status: SHIPPED | OUTPATIENT
Start: 2023-08-08

## 2023-08-09 DIAGNOSIS — F34.1 DYSTHYMIC DISORDER: ICD-10-CM

## 2023-08-09 RX ORDER — FLUOXETINE HYDROCHLORIDE 20 MG/1
CAPSULE ORAL
Qty: 30 CAPSULE | Refills: 0 | Status: SHIPPED | OUTPATIENT
Start: 2023-08-09

## 2023-09-07 DIAGNOSIS — F34.1 DYSTHYMIC DISORDER: ICD-10-CM

## 2023-09-07 RX ORDER — FLUOXETINE HYDROCHLORIDE 20 MG/1
CAPSULE ORAL
Qty: 30 CAPSULE | Refills: 0 | OUTPATIENT
Start: 2023-09-07

## 2023-09-08 DIAGNOSIS — I10 ESSENTIAL (PRIMARY) HYPERTENSION: ICD-10-CM

## 2023-09-08 DIAGNOSIS — E78.5 HYPERLIPIDEMIA, UNSPECIFIED: ICD-10-CM

## 2023-09-08 RX ORDER — ATORVASTATIN CALCIUM 20 MG/1
TABLET, FILM COATED ORAL
Qty: 30 TABLET | Refills: 0 | OUTPATIENT
Start: 2023-09-08

## 2023-09-08 RX ORDER — OLMESARTAN MEDOXOMIL 20 MG/1
TABLET ORAL
Qty: 30 TABLET | Refills: 0 | OUTPATIENT
Start: 2023-09-08

## 2023-11-20 ENCOUNTER — TELEPHONE (OUTPATIENT)
Age: 78
End: 2023-11-20

## 2023-11-20 DIAGNOSIS — F34.1 DYSTHYMIC DISORDER: ICD-10-CM

## 2023-11-20 RX ORDER — FLUOXETINE HYDROCHLORIDE 20 MG/1
CAPSULE ORAL
Qty: 90 CAPSULE | Refills: 0 | Status: SHIPPED | OUTPATIENT
Start: 2023-11-20

## 2023-11-20 NOTE — TELEPHONE ENCOUNTER
----- Message from April Awa sent at 11/17/2023  1:34 PM EST -----  Subject: Refill Request    QUESTIONS  Name of Medication? FLUoxetine (PROZAC) 20 MG capsule  Patient-reported dosage and instructions? once daily   How many days do you have left? 0  Preferred Pharmacy? [id number]  Pharmacy phone number (if available)? [phone]  Additional Information for Provider? patient next appointment on 11/21,   currently out of medication  ---------------------------------------------------------------------------  --------------  [address]  What is the best way for the office to contact you? OK to leave message on   voicemail  Preferred Call Back Phone Number? [phone]  ---------------------------------------------------------------------------  --------------  SCRIPT ANSWERS  Relationship to Patient?  Self

## 2023-11-28 ENCOUNTER — OFFICE VISIT (OUTPATIENT)
Age: 78
End: 2023-11-28
Payer: MEDICARE

## 2023-11-28 VITALS
DIASTOLIC BLOOD PRESSURE: 60 MMHG | BODY MASS INDEX: 59.07 KG/M2 | WEIGHT: 293 LBS | SYSTOLIC BLOOD PRESSURE: 104 MMHG | HEIGHT: 59 IN | HEART RATE: 80 BPM | OXYGEN SATURATION: 92 % | RESPIRATION RATE: 16 BRPM | TEMPERATURE: 97.3 F

## 2023-11-28 DIAGNOSIS — I89.0 LYMPHEDEMA: ICD-10-CM

## 2023-11-28 DIAGNOSIS — G47.33 OBSTRUCTIVE SLEEP APNEA (ADULT) (PEDIATRIC): ICD-10-CM

## 2023-11-28 DIAGNOSIS — E11.9 TYPE 2 DIABETES MELLITUS WITHOUT COMPLICATION, WITHOUT LONG-TERM CURRENT USE OF INSULIN (HCC): ICD-10-CM

## 2023-11-28 DIAGNOSIS — E66.01 MORBID (SEVERE) OBESITY DUE TO EXCESS CALORIES (HCC): ICD-10-CM

## 2023-11-28 DIAGNOSIS — G47.9 SLEEP DISORDER, UNSPECIFIED: ICD-10-CM

## 2023-11-28 DIAGNOSIS — Z00.00 MEDICARE ANNUAL WELLNESS VISIT, SUBSEQUENT: Primary | ICD-10-CM

## 2023-11-28 DIAGNOSIS — I10 ESSENTIAL (PRIMARY) HYPERTENSION: ICD-10-CM

## 2023-11-28 DIAGNOSIS — E78.5 HYPERLIPIDEMIA LDL GOAL <100: ICD-10-CM

## 2023-11-28 PROCEDURE — 3078F DIAST BP <80 MM HG: CPT | Performed by: INTERNAL MEDICINE

## 2023-11-28 PROCEDURE — 1123F ACP DISCUSS/DSCN MKR DOCD: CPT | Performed by: INTERNAL MEDICINE

## 2023-11-28 PROCEDURE — 3074F SYST BP LT 130 MM HG: CPT | Performed by: INTERNAL MEDICINE

## 2023-11-28 PROCEDURE — 99214 OFFICE O/P EST MOD 30 MIN: CPT | Performed by: INTERNAL MEDICINE

## 2023-11-28 PROCEDURE — G0439 PPPS, SUBSEQ VISIT: HCPCS | Performed by: INTERNAL MEDICINE

## 2023-11-28 SDOH — ECONOMIC STABILITY: HOUSING INSECURITY
IN THE LAST 12 MONTHS, WAS THERE A TIME WHEN YOU DID NOT HAVE A STEADY PLACE TO SLEEP OR SLEPT IN A SHELTER (INCLUDING NOW)?: NO

## 2023-11-28 SDOH — ECONOMIC STABILITY: FOOD INSECURITY: WITHIN THE PAST 12 MONTHS, THE FOOD YOU BOUGHT JUST DIDN'T LAST AND YOU DIDN'T HAVE MONEY TO GET MORE.: NEVER TRUE

## 2023-11-28 SDOH — ECONOMIC STABILITY: FOOD INSECURITY: WITHIN THE PAST 12 MONTHS, YOU WORRIED THAT YOUR FOOD WOULD RUN OUT BEFORE YOU GOT MONEY TO BUY MORE.: NEVER TRUE

## 2023-11-28 SDOH — ECONOMIC STABILITY: INCOME INSECURITY: HOW HARD IS IT FOR YOU TO PAY FOR THE VERY BASICS LIKE FOOD, HOUSING, MEDICAL CARE, AND HEATING?: SOMEWHAT HARD

## 2023-11-28 ASSESSMENT — LIFESTYLE VARIABLES
HOW MANY STANDARD DRINKS CONTAINING ALCOHOL DO YOU HAVE ON A TYPICAL DAY: 1 OR 2
HOW OFTEN DO YOU HAVE A DRINK CONTAINING ALCOHOL: 2-4 TIMES A MONTH

## 2023-11-28 ASSESSMENT — PATIENT HEALTH QUESTIONNAIRE - PHQ9
SUM OF ALL RESPONSES TO PHQ QUESTIONS 1-9: 1
1. LITTLE INTEREST OR PLEASURE IN DOING THINGS: 0
SUM OF ALL RESPONSES TO PHQ QUESTIONS 1-9: 1
SUM OF ALL RESPONSES TO PHQ QUESTIONS 1-9: 1
2. FEELING DOWN, DEPRESSED OR HOPELESS: 1
SUM OF ALL RESPONSES TO PHQ QUESTIONS 1-9: 1
SUM OF ALL RESPONSES TO PHQ9 QUESTIONS 1 & 2: 1

## 2023-11-28 NOTE — PATIENT INSTRUCTIONS
visit is recommended every 6 months. Try to get at least 150 minutes of exercise per week or 10,000 steps per day on a pedometer . Order or download the FREE \"Exercise & Physical Activity: Your Everyday Guide\" from [employer]. Call 8-452.756.7952 or search [employer] online. You need 0600-2129 mg of calcium and 7418-8002 IU of vitamin D per day. It is possible to meet your calcium requirement with diet alone, but a vitamin D supplement is usually necessary to meet this goal.  When exposed to the sun, use a sunscreen that protects against both UVA and UVB radiation with an SPF of 30 or greater. Reapply every 2 to 3 hours or after sweating, drying off with a towel, or swimming. Always wear a seat belt when traveling in a car. Always wear a helmet when riding a bicycle or motorcycle.

## 2023-11-29 LAB
ALBUMIN SERPL-MCNC: 3.8 G/DL (ref 3.5–5)
ALBUMIN/GLOB SERPL: 1 (ref 1.1–2.2)
ALP SERPL-CCNC: 80 U/L (ref 45–117)
ALT SERPL-CCNC: 63 U/L (ref 12–78)
ANION GAP SERPL CALC-SCNC: 2 MMOL/L (ref 5–15)
AST SERPL-CCNC: 46 U/L (ref 15–37)
BILIRUB SERPL-MCNC: 0.6 MG/DL (ref 0.2–1)
BUN SERPL-MCNC: 20 MG/DL (ref 6–20)
BUN/CREAT SERPL: 24 (ref 12–20)
CALCIUM SERPL-MCNC: 9 MG/DL (ref 8.5–10.1)
CHLORIDE SERPL-SCNC: 106 MMOL/L (ref 97–108)
CHOLEST SERPL-MCNC: 194 MG/DL
CO2 SERPL-SCNC: 30 MMOL/L (ref 21–32)
CREAT SERPL-MCNC: 0.82 MG/DL (ref 0.55–1.02)
CREAT UR-MCNC: 473 MG/DL
ERYTHROCYTE [DISTWIDTH] IN BLOOD BY AUTOMATED COUNT: 13.1 % (ref 11.5–14.5)
EST. AVERAGE GLUCOSE BLD GHB EST-MCNC: 143 MG/DL
GLOBULIN SER CALC-MCNC: 3.8 G/DL (ref 2–4)
GLUCOSE SERPL-MCNC: 130 MG/DL (ref 65–100)
HBA1C MFR BLD: 6.6 % (ref 4–5.6)
HCT VFR BLD AUTO: 45.2 % (ref 35–47)
HDLC SERPL-MCNC: 66 MG/DL
HDLC SERPL: 2.9 (ref 0–5)
HGB BLD-MCNC: 14 G/DL (ref 11.5–16)
LDLC SERPL CALC-MCNC: 106.8 MG/DL (ref 0–100)
MCH RBC QN AUTO: 30 PG (ref 26–34)
MCHC RBC AUTO-ENTMCNC: 31 G/DL (ref 30–36.5)
MCV RBC AUTO: 97 FL (ref 80–99)
MICROALBUMIN UR-MCNC: 58 MG/DL
MICROALBUMIN/CREAT UR-RTO: 123 MG/G (ref 0–30)
NRBC # BLD: 0 K/UL (ref 0–0.01)
NRBC BLD-RTO: 0 PER 100 WBC
PLATELET # BLD AUTO: 261 K/UL (ref 150–400)
PMV BLD AUTO: 10.8 FL (ref 8.9–12.9)
POTASSIUM SERPL-SCNC: 4.5 MMOL/L (ref 3.5–5.1)
PROT SERPL-MCNC: 7.6 G/DL (ref 6.4–8.2)
RBC # BLD AUTO: 4.66 M/UL (ref 3.8–5.2)
SODIUM SERPL-SCNC: 138 MMOL/L (ref 136–145)
SPECIMEN HOLD: NORMAL
TRIGL SERPL-MCNC: 106 MG/DL
VLDLC SERPL CALC-MCNC: 21.2 MG/DL
WBC # BLD AUTO: 8.9 K/UL (ref 3.6–11)

## 2023-12-01 ENCOUNTER — TELEPHONE (OUTPATIENT)
Age: 78
End: 2023-12-01

## 2023-12-01 NOTE — TELEPHONE ENCOUNTER
----- Message from Carly Tanner MD sent at 11/29/2023 12:24 PM EST -----  Please call her sugars are not better, I want to try ozempic and see if that works; it is a once a week injection

## 2024-02-23 DIAGNOSIS — F34.1 DYSTHYMIC DISORDER: ICD-10-CM

## 2024-02-23 DIAGNOSIS — E78.5 HYPERLIPIDEMIA, UNSPECIFIED: ICD-10-CM

## 2024-02-23 DIAGNOSIS — I10 ESSENTIAL (PRIMARY) HYPERTENSION: ICD-10-CM

## 2024-02-23 RX ORDER — FLUOXETINE HYDROCHLORIDE 20 MG/1
CAPSULE ORAL
Qty: 90 CAPSULE | Refills: 0 | Status: SHIPPED | OUTPATIENT
Start: 2024-02-23

## 2024-02-23 RX ORDER — OLMESARTAN MEDOXOMIL 20 MG/1
20 TABLET ORAL DAILY
Qty: 90 TABLET | Refills: 0 | Status: SHIPPED | OUTPATIENT
Start: 2024-02-23

## 2024-02-23 RX ORDER — ATORVASTATIN CALCIUM 20 MG/1
20 TABLET, FILM COATED ORAL DAILY
Qty: 90 TABLET | Refills: 0 | Status: SHIPPED | OUTPATIENT
Start: 2024-02-23

## 2024-02-23 NOTE — TELEPHONE ENCOUNTER
The patient is requesting a Rx refill on the medication listed below:     olmesartan (BENICAR) 20 MG tablet       FLUoxetine (PROZAC) 20 MG capsule       atorvastatin (LIPITOR) 20 MG tablet   [id number] - [place], [place] - [address] [place] [address] [phone] - F [phone]

## 2024-02-23 NOTE — TELEPHONE ENCOUNTER
Chief Complaint   Patient presents with    Other       Requested Prescriptions     Pending Prescriptions Disp Refills    atorvastatin (LIPITOR) 20 MG tablet 30 tablet 0     Sig: Take 1 tablet by mouth daily    FLUoxetine (PROZAC) 20 MG capsule 90 capsule 0     Sig: TAKE 1 CAPSULE BY MOUTH EVERY DAY    olmesartan (BENICAR) 20 MG tablet 30 tablet 0     Sig: Take 1 tablet by mouth daily       Allergies:  Allergies   Allergen Reactions    Ciprofloxacin Swelling    Penicillins Swelling     Unsure of reaction    Sulfa Antibiotics      Other reaction(s): Unknown (comments)  She doesn't remember       Last visit with clinic:  11/28/2023   Next visit with clinic: 3/1/2024     Last visit with this provider: 11/28/2023   Next Visit with this provider: 3/1/2024    Signed by Jacky BE  02/23/24  4:03 PM

## 2024-02-27 NOTE — PROGRESS NOTES
Harriet Mcmillan (:  1945) is a 78 y.o. female,Established patient, here for evaluation of the following chief complaint(s):  Medication Check and Diabetes (Pt is fasting)         ASSESSMENT/PLAN:  1. Type 2 diabetes mellitus without complication, without long-term current use of insulin (HCC)- add metformin and we can add ozempic if we cannot get sugars down or cannot tolerate, she is going to meet with Lauren regarding this   -     metFORMIN (GLUCOPHAGE) 500 MG tablet; Take 1 tablet by mouth 2 times daily (with meals) Start off one pill a day for 7-10 days then go to two a day, Disp-60 tablet, R-5Normal  2. Essential (primary) hypertension- verys tressed today and BP up, will have her repeat with Lauren- cont current dose of benicar   3. Hyperlipidemia LDL goal <100-cont with lipitor   4. Obstructive sleep apnea (adult) (pediatric)-tolerating CPAP   5. Sleep disorder, unspecified- cont with CPAP  6. Morbid (severe) obesity due to excess calories (HCC)-start metformin and focus on insulin resistance   7. Lymphedema-waiting on lymphedema clinic      No follow-ups on file.     Diabetic diet  Benicar   Prozac  Lipitor  Benicar,lasix  CPAP  Compression stockings    Subjective   SUBJECTIVE/OBJECTIVE:  HPI    Hypertension ROS: taking medications as instructed, no medication side effects noted, no TIA's, no chest pain on exertion, no dyspnea on exertion, no swelling of ankles.   New concerns: BP in office today is normally under control but could not get through the phones and was late today   Pt is followed by Dr. Rand.     Hyperlipidemia:  Cardiovascular risk analysis - 75 y.o. female LDL goal is under 130.   ROS: taking medications as instructed, no medication side effects noted, no TIA's, no chest pain on exertion, no dyspnea on exertion, no swelling of ankles. Tolerating meds, no myalgias or other side effects noted. New concerns:           Lab Results   Component Value Date    CHOL 194 2023     Lab

## 2024-03-01 ENCOUNTER — OFFICE VISIT (OUTPATIENT)
Age: 79
End: 2024-03-01
Payer: MEDICARE

## 2024-03-01 VITALS
HEART RATE: 78 BPM | HEIGHT: 59 IN | WEIGHT: 293 LBS | DIASTOLIC BLOOD PRESSURE: 90 MMHG | TEMPERATURE: 97.9 F | RESPIRATION RATE: 18 BRPM | SYSTOLIC BLOOD PRESSURE: 160 MMHG | BODY MASS INDEX: 59.07 KG/M2 | OXYGEN SATURATION: 95 %

## 2024-03-01 DIAGNOSIS — E66.01 MORBID (SEVERE) OBESITY DUE TO EXCESS CALORIES (HCC): ICD-10-CM

## 2024-03-01 DIAGNOSIS — E78.5 HYPERLIPIDEMIA LDL GOAL <100: ICD-10-CM

## 2024-03-01 DIAGNOSIS — G47.9 SLEEP DISORDER, UNSPECIFIED: ICD-10-CM

## 2024-03-01 DIAGNOSIS — E11.9 TYPE 2 DIABETES MELLITUS WITHOUT COMPLICATION, WITHOUT LONG-TERM CURRENT USE OF INSULIN (HCC): Primary | ICD-10-CM

## 2024-03-01 DIAGNOSIS — I89.0 LYMPHEDEMA: ICD-10-CM

## 2024-03-01 DIAGNOSIS — G47.33 OBSTRUCTIVE SLEEP APNEA (ADULT) (PEDIATRIC): ICD-10-CM

## 2024-03-01 DIAGNOSIS — I10 ESSENTIAL (PRIMARY) HYPERTENSION: ICD-10-CM

## 2024-03-01 PROCEDURE — 99214 OFFICE O/P EST MOD 30 MIN: CPT | Performed by: INTERNAL MEDICINE

## 2024-03-01 PROCEDURE — 1123F ACP DISCUSS/DSCN MKR DOCD: CPT | Performed by: INTERNAL MEDICINE

## 2024-03-01 PROCEDURE — 3077F SYST BP >= 140 MM HG: CPT | Performed by: INTERNAL MEDICINE

## 2024-03-01 PROCEDURE — 3080F DIAST BP >= 90 MM HG: CPT | Performed by: INTERNAL MEDICINE

## 2024-03-01 ASSESSMENT — PATIENT HEALTH QUESTIONNAIRE - PHQ9
SUM OF ALL RESPONSES TO PHQ QUESTIONS 1-9: 0
SUM OF ALL RESPONSES TO PHQ QUESTIONS 1-9: 0
SUM OF ALL RESPONSES TO PHQ9 QUESTIONS 1 & 2: 0
2. FEELING DOWN, DEPRESSED OR HOPELESS: 0
SUM OF ALL RESPONSES TO PHQ QUESTIONS 1-9: 0
SUM OF ALL RESPONSES TO PHQ QUESTIONS 1-9: 0
1. LITTLE INTEREST OR PLEASURE IN DOING THINGS: 0

## 2024-03-26 ENCOUNTER — PHARMACY VISIT (OUTPATIENT)
Age: 79
End: 2024-03-26

## 2024-03-26 DIAGNOSIS — E11.9 TYPE 2 DIABETES MELLITUS WITHOUT COMPLICATION, WITHOUT LONG-TERM CURRENT USE OF INSULIN (HCC): Primary | ICD-10-CM

## 2024-03-26 RX ORDER — METFORMIN HYDROCHLORIDE 500 MG/1
1000 TABLET, EXTENDED RELEASE ORAL DAILY
Qty: 60 TABLET | Refills: 1 | Status: SHIPPED | OUTPATIENT
Start: 2024-03-26

## 2024-03-26 NOTE — PROGRESS NOTES
134/80     Pulse Readings from Last 3 Encounters:   03/01/24 78   11/28/23 80   06/08/22 90     Hemoglobin A1C   Date Value Ref Range Status   11/28/2023 6.6 (H) 4.0 - 5.6 % Final     Comment:     (NOTE)  HbA1C Interpretive Ranges  <5.7              Normal  5.7 - 6.4         Consider Prediabetes  >6.5              Consider Diabetes           Estimated Creatinine Clearance: 44 mL/min (based on SCr of 0.82 mg/dL).      Medication reconciliation was completed during the visit.    There are no discontinued medications.    Patient verbalized understanding of the information presented and all of the patient’s questions were answered.  AVS was handed to the patient. Patient advised to call the office with any additional questions or concerns.    Thank you for the consult,  Lauren Youssef, PharmD, BCPS, CDCES      For Pharmacy Admin Tracking Only    Program: Medical Group  CPA in place:  Yes  Recommendation Provided To: Patient/Caregiver: 2 via In person  Intervention Detail: New Rx: 1, reason: STACIE and Scheduled Appointment  Intervention Accepted By: Patient/Caregiver: 2  Gap Closed?: No   Time Spent (min): 60

## 2024-03-26 NOTE — PATIENT INSTRUCTIONS
Start taking Metformin TWICE daily - once in the morning/earlier part of the day and once later in the day - try to take it with food    Once you finish what you have -  the extended release from [facility] - you can take 2 tablets at the same time when you are on the ER product

## 2024-04-18 RX ORDER — METFORMIN HYDROCHLORIDE 500 MG/1
1000 TABLET, EXTENDED RELEASE ORAL DAILY
Qty: 180 TABLET | Refills: 1 | Status: SHIPPED | OUTPATIENT
Start: 2024-04-18

## 2024-04-24 ENCOUNTER — PHARMACY VISIT (OUTPATIENT)
Age: 79
End: 2024-04-24

## 2024-04-24 DIAGNOSIS — E11.9 TYPE 2 DIABETES MELLITUS WITHOUT COMPLICATION, WITHOUT LONG-TERM CURRENT USE OF INSULIN (HCC): Primary | ICD-10-CM

## 2024-04-24 NOTE — PROGRESS NOTES
Diabetic: Yes      Tobacco smoker: No      Systolic Blood Pressure: 160 mmHg      Is BP treated: Yes      HDL Cholesterol: 66 MG/DL      Total Cholesterol: 194 MG/DL     Vitals:  Wt Readings from Last 3 Encounters:   03/01/24 (!) 140.3 kg (309 lb 3.2 oz)   11/28/23 (!) 140.3 kg (309 lb 6.4 oz)   06/08/22 (!) 138.9 kg (306 lb 3.2 oz)     BP Readings from Last 3 Encounters:   03/01/24 (!) 160/90   11/28/23 104/60   06/08/22 134/80     Pulse Readings from Last 3 Encounters:   03/01/24 78   11/28/23 80   06/08/22 90     Hemoglobin A1C   Date Value Ref Range Status   11/28/2023 6.6 (H) 4.0 - 5.6 % Final     Comment:     (NOTE)  HbA1C Interpretive Ranges  <5.7              Normal  5.7 - 6.4         Consider Prediabetes  >6.5              Consider Diabetes           Estimated Creatinine Clearance: 77 mL/min (based on SCr of 0.82 mg/dL).      Medication reconciliation was completed during the visit.    There are no discontinued medications.    Patient verbalized understanding of the information presented and all of the patient’s questions were answered.  AVS was handed to the patient. Patient advised to call the office with any additional questions or concerns.    Thank you for the consult,  Lauren Youssef, PharmD, BCPS, CDCES      For Pharmacy Admin Tracking Only    Program: Medical Group  CPA in place:  Yes  Recommendation Provided To: Patient/Caregiver: 1 via In person  Intervention Detail: Scheduled Appointment  Intervention Accepted By: Patient/Caregiver: 1  Gap Closed?: No   Time Spent (min): 30

## 2024-05-23 DIAGNOSIS — E78.5 HYPERLIPIDEMIA, UNSPECIFIED: ICD-10-CM

## 2024-05-23 DIAGNOSIS — F34.1 DYSTHYMIC DISORDER: ICD-10-CM

## 2024-05-23 RX ORDER — ATORVASTATIN CALCIUM 20 MG/1
20 TABLET, FILM COATED ORAL DAILY
Qty: 90 TABLET | Refills: 1 | Status: SHIPPED | OUTPATIENT
Start: 2024-05-23

## 2024-05-23 RX ORDER — FLUOXETINE HYDROCHLORIDE 20 MG/1
CAPSULE ORAL
Qty: 90 CAPSULE | Refills: 1 | Status: SHIPPED | OUTPATIENT
Start: 2024-05-23

## 2024-05-26 NOTE — PROGRESS NOTES
Extraocular movements intact.      Conjunctiva/sclera: Conjunctivae normal.   Cardiovascular:      Rate and Rhythm: Normal rate and regular rhythm.   Pulmonary:      Effort: Pulmonary effort is normal.      Breath sounds: Normal breath sounds.   Abdominal:      General: Bowel sounds are normal.      Palpations: Abdomen is soft.   Musculoskeletal:         General: Normal range of motion.      Cervical back: Normal range of motion.   Skin:     General: Skin is warm.   Neurological:      General: No focal deficit present.      Mental Status: She is alert and oriented to person, place, and time. Mental status is at baseline.   Psychiatric:         Mood and Affect: Mood normal.         Behavior: Behavior normal.         Thought Content: Thought content normal.         Judgment: Judgment normal.            On this date 5/28/2024 I have spent 35 minutes reviewing previous notes, test results and face to face with the patient discussing the diagnosis and importance of compliance with the treatment plan as well as documenting on the day of the visit.    The patient (or guardian, if applicable) and other individuals in attendance with the patient were advised that Artificial Intelligence will be utilized during this visit to record and process the conversation to generate a clinical note. The patient (or guardian, if applicable) and other individuals in attendance at the appointment consented to the use of AI, including the recording.                   An electronic signature was used to authenticate this note.    --Martha Whiting MD

## 2024-05-28 ENCOUNTER — OFFICE VISIT (OUTPATIENT)
Age: 79
End: 2024-05-28
Payer: MEDICARE

## 2024-05-28 ENCOUNTER — PHARMACY VISIT (OUTPATIENT)
Age: 79
End: 2024-05-28

## 2024-05-28 VITALS
RESPIRATION RATE: 16 BRPM | BODY MASS INDEX: 57.36 KG/M2 | HEART RATE: 83 BPM | HEIGHT: 59 IN | DIASTOLIC BLOOD PRESSURE: 62 MMHG | SYSTOLIC BLOOD PRESSURE: 124 MMHG | WEIGHT: 284.5 LBS | OXYGEN SATURATION: 90 % | TEMPERATURE: 97.7 F

## 2024-05-28 DIAGNOSIS — E78.5 HYPERLIPIDEMIA LDL GOAL <100: ICD-10-CM

## 2024-05-28 DIAGNOSIS — E11.9 TYPE 2 DIABETES MELLITUS WITHOUT COMPLICATION, WITHOUT LONG-TERM CURRENT USE OF INSULIN (HCC): Primary | ICD-10-CM

## 2024-05-28 DIAGNOSIS — E66.01 MORBID (SEVERE) OBESITY DUE TO EXCESS CALORIES (HCC): ICD-10-CM

## 2024-05-28 DIAGNOSIS — I10 ESSENTIAL (PRIMARY) HYPERTENSION: ICD-10-CM

## 2024-05-28 DIAGNOSIS — G47.9 SLEEP DISORDER, UNSPECIFIED: ICD-10-CM

## 2024-05-28 DIAGNOSIS — I89.0 LYMPHEDEMA: ICD-10-CM

## 2024-05-28 DIAGNOSIS — G47.33 OBSTRUCTIVE SLEEP APNEA (ADULT) (PEDIATRIC): ICD-10-CM

## 2024-05-28 DIAGNOSIS — F34.1 DYSTHYMIC DISORDER: ICD-10-CM

## 2024-05-28 PROCEDURE — 3074F SYST BP LT 130 MM HG: CPT | Performed by: INTERNAL MEDICINE

## 2024-05-28 PROCEDURE — 1123F ACP DISCUSS/DSCN MKR DOCD: CPT | Performed by: INTERNAL MEDICINE

## 2024-05-28 PROCEDURE — 3078F DIAST BP <80 MM HG: CPT | Performed by: INTERNAL MEDICINE

## 2024-05-28 PROCEDURE — 99214 OFFICE O/P EST MOD 30 MIN: CPT | Performed by: INTERNAL MEDICINE

## 2024-05-28 RX ORDER — OLMESARTAN MEDOXOMIL 20 MG/1
20 TABLET ORAL DAILY
Qty: 90 TABLET | Refills: 1 | Status: SHIPPED | OUTPATIENT
Start: 2024-05-28

## 2024-05-28 NOTE — PROGRESS NOTES
Pharmacy Progress Note - Diabetes Management    Assessment / Plan:   Diabetes Management:  - Per ADA guidelines, Pt's A1c is at goal of < 7% but is due to be rechecked today.   - Current SMBG(s)/CGM trend not evaluated as patient is not checking  - Patient still thinks she's having some stomach issue with even 1 metformin/day but maybe less diarrhea. Discussed other options - ozempic/trulicity vs mounjaro vs rybelsus vs jardiance/farxiga. Discussed the added benefit of weight loss would be helpful. Discussed the price cut off for patient assistance and that outside of that, the biggest issue is the coverage gap. Patient to check finances and to check with insurance about cost during coverage gap.  - [facility] and could fill it for her to     Follow up: 6/26/2024       S/O: Harriet Mcmillan is a 78 y.o. female referred by Martha Roberson MD for diabetes management.     Knows she's been in a funk since her friend dying.   Decreased metformin to 1 tablet daily and thinks the diarrhea issue has been a little better    Current diabetes regimen include(s):    - Metformin ER 500mg 2 tabs daily    ROS:  Today, Pt endorses:  - Symptoms of Hyperglycemia: none  - Symptoms of Hypoglycemia: none    Blood Glucose Monitoring (BGM) or CGM:  Not checking    Nutrition:  No significant changes    Physical Activity:   Tries to go to the pool - will start going once it warms up a little    Last eye exam:  confirm with patient    The 10-year ASCVD risk score (Marie BECKMAN, et al., 2019) is: 42.2%    Values used to calculate the score:      Age: 78 years      Sex: Female      Is Non- : No      Diabetic: Yes      Tobacco smoker: No      Systolic Blood Pressure: 124 mmHg      Is BP treated: Yes      HDL Cholesterol: 66 MG/DL      Total Cholesterol: 194 MG/DL     Vitals:  Wt Readings from Last 3 Encounters:   05/28/24 129 kg (284 lb 8 oz)   03/01/24 (!) 140.3 kg (309

## 2024-05-29 LAB
ALBUMIN SERPL-MCNC: 3.7 G/DL (ref 3.5–5)
ALBUMIN/GLOB SERPL: 0.9 (ref 1.1–2.2)
ALP SERPL-CCNC: 87 U/L (ref 45–117)
ALT SERPL-CCNC: 34 U/L (ref 12–78)
ANION GAP SERPL CALC-SCNC: 7 MMOL/L (ref 5–15)
AST SERPL-CCNC: 26 U/L (ref 15–37)
BILIRUB SERPL-MCNC: 1 MG/DL (ref 0.2–1)
BUN SERPL-MCNC: 26 MG/DL (ref 6–20)
BUN/CREAT SERPL: 27 (ref 12–20)
CALCIUM SERPL-MCNC: 9.8 MG/DL (ref 8.5–10.1)
CHLORIDE SERPL-SCNC: 105 MMOL/L (ref 97–108)
CHOLEST SERPL-MCNC: 177 MG/DL
CO2 SERPL-SCNC: 27 MMOL/L (ref 21–32)
CREAT SERPL-MCNC: 0.96 MG/DL (ref 0.55–1.02)
EST. AVERAGE GLUCOSE BLD GHB EST-MCNC: 126 MG/DL
GLOBULIN SER CALC-MCNC: 4 G/DL (ref 2–4)
GLUCOSE SERPL-MCNC: 127 MG/DL (ref 65–100)
HBA1C MFR BLD: 6 % (ref 4–5.6)
HDLC SERPL-MCNC: 59 MG/DL
HDLC SERPL: 3 (ref 0–5)
LDLC SERPL CALC-MCNC: 96.4 MG/DL (ref 0–100)
POTASSIUM SERPL-SCNC: 4.5 MMOL/L (ref 3.5–5.1)
PROT SERPL-MCNC: 7.7 G/DL (ref 6.4–8.2)
SODIUM SERPL-SCNC: 139 MMOL/L (ref 136–145)
TRIGL SERPL-MCNC: 108 MG/DL
VLDLC SERPL CALC-MCNC: 21.6 MG/DL

## 2024-06-26 ENCOUNTER — PHARMACY VISIT (OUTPATIENT)
Age: 79
End: 2024-06-26

## 2024-06-26 DIAGNOSIS — E11.9 TYPE 2 DIABETES MELLITUS WITHOUT COMPLICATION, WITHOUT LONG-TERM CURRENT USE OF INSULIN (HCC): Primary | ICD-10-CM

## 2024-06-26 NOTE — PROGRESS NOTES
Pharmacy Progress Note - Diabetes Management    Assessment / Plan:   Diabetes Management:  - Per ADA guidelines, Pt's A1c is at goal of < 7%.   - Current SMBG(s)/CGM trend not evaluated  - discussed options of other medications - patient to check on financial info to see if she would qualify for patient assistance  - will follow up in about a month to see how things are going    Follow up: 7/31/2024       S/O: Harrite Mcmillan is a 78 y.o. female referred by Martha Roberson MD for diabetes management.     Current diabetes regimen include(s):    - Metformin ER 500mg 2 tabs daily     ROS:  Today, Pt endorses:  - Symptoms of Hyperglycemia: none  - Symptoms of Hypoglycemia: none    Blood Glucose Monitoring (BGM) or CGM:  Not checking    Nutrition:  Working on dietary changes - not noticing scale changes    Physical Activity:   Has been to the pool 15/17 days        The 10-year ASCVD risk score (Marie BECKMAN, et al., 2019) is: 41.8%    Values used to calculate the score:      Age: 78 years      Sex: Female      Is Non- : No      Diabetic: Yes      Tobacco smoker: No      Systolic Blood Pressure: 124 mmHg      Is BP treated: Yes      HDL Cholesterol: 59 MG/DL      Total Cholesterol: 177 MG/DL     Vitals:  Wt Readings from Last 3 Encounters:   05/28/24 129 kg (284 lb 8 oz)   03/01/24 (!) 140.3 kg (309 lb 3.2 oz)   11/28/23 (!) 140.3 kg (309 lb 6.4 oz)     BP Readings from Last 3 Encounters:   05/28/24 124/62   03/01/24 (!) 160/90   11/28/23 104/60     Pulse Readings from Last 3 Encounters:   05/28/24 83   03/01/24 78   11/28/23 80       Lab Results   Component Value Date/Time    LDL 96.4 05/28/2024 11:27 AM    .8 11/28/2023 03:59 PM     Hemoglobin A1C   Date Value Ref Range Status   05/28/2024 6.0 (H) 4.0 - 5.6 % Final     Comment:     (NOTE)  HbA1C Interpretive Ranges  <5.7              Normal  5.7 - 6.4         Consider Prediabetes  >6.5              Consider Diabetes           Estimated

## 2024-07-31 ENCOUNTER — PHARMACY VISIT (OUTPATIENT)
Age: 79
End: 2024-07-31

## 2024-07-31 DIAGNOSIS — E11.9 TYPE 2 DIABETES MELLITUS WITHOUT COMPLICATION, WITHOUT LONG-TERM CURRENT USE OF INSULIN (HCC): Primary | ICD-10-CM

## 2024-07-31 NOTE — PROGRESS NOTES
Pharmacy Progress Note - Diabetes Management    Assessment / Plan:   Diabetes Management:  - Per ADA guidelines, Pt's A1c is at goal of < 7%.   - Current SMBG(s)/CGM trend not evaluated as patient is not checking  - discussed options again, patietn still having side effects with metformin  - discussed patient sasistance income requirements, drug class options and option of using emely to check blood sugars  - patient to follow up and let me know how things are going    Follow up: 8/28/2024       S/O: Harriet Mcmillan is a 78 y.o. female referred by Martha Roberson MD for diabetes management. Last visit discussed medication options and patient was to check on financial information to see if she will qualify for patient assistance.     Current diabetes regimen include(s):    - Metformin ER 500mg 2 tabs daily     Sunday evening, all day Monday - bad diarrhea    ROS:  Today, Pt endorses:  - Symptoms of Hyperglycemia: none  - Symptoms of Hypoglycemia: none    Blood Glucose Monitoring (BGM) or CGM:  Not checking    Nutrition:  Working on positive changes    Physical Activity:   Still going to the pool    Last eye exam:  confirm    The 10-year ASCVD risk score (Marie BECKMAN, et al., 2019) is: 41.8%    Values used to calculate the score:      Age: 78 years      Sex: Female      Is Non- : No      Diabetic: Yes      Tobacco smoker: No      Systolic Blood Pressure: 124 mmHg      Is BP treated: Yes      HDL Cholesterol: 59 MG/DL      Total Cholesterol: 177 MG/DL     Vitals:  Wt Readings from Last 3 Encounters:   05/28/24 129 kg (284 lb 8 oz)   03/01/24 (!) 140.3 kg (309 lb 3.2 oz)   11/28/23 (!) 140.3 kg (309 lb 6.4 oz)     BP Readings from Last 3 Encounters:   05/28/24 124/62   03/01/24 (!) 160/90   11/28/23 104/60     Pulse Readings from Last 3 Encounters:   05/28/24 83   03/01/24 78   11/28/23 80       Lab Results   Component Value Date/Time    LDL 96.4 05/28/2024 11:27 AM    .8 11/28/2023

## 2024-08-28 ENCOUNTER — PHARMACY VISIT (OUTPATIENT)
Age: 79
End: 2024-08-28

## 2024-08-28 DIAGNOSIS — E11.9 TYPE 2 DIABETES MELLITUS WITHOUT COMPLICATION, WITHOUT LONG-TERM CURRENT USE OF INSULIN (HCC): Primary | ICD-10-CM

## 2024-08-28 NOTE — PROGRESS NOTES
Pharmacy Progress Note - Diabetes Management    Assessment / Plan:   Diabetes Management:  - Per ADA guidelines, Pt's A1c is at goal of < 7%.   - Current SMBG(s)/CGM trend is not evaluated as patient is not currently checking her blood sugars  - Patient to call her insurance to discuss price of Ozempic/Trulicity and Jardiance/Farxiga  - Discussed in detail the health benefits of weight loss and the improved quality of life she would see by being able to stop the metformin 2/2 side effects  - will evaluate next month what our plan is with medication    Follow up: 9/25/2024       S/O: Harriet Mcmillan is a 78 y.o. female referred by Martha Roberson MD for diabetes management. Patient checked on her financial situation and she would NOT qualify for patient assistance at this time. She's still having unpredictable diarrhea with metformin which really limits her QOL as there are a lot of things she'd like to do but won't because of this.     Current diabetes regimen include(s):    - Metformin ER 500mg 2 tabs daily     ROS:  Today, Pt endorses:  - Symptoms of Hyperglycemia: none  - Symptoms of Hypoglycemia: none    Blood Glucose Monitoring (BGM) or CGM:  Not checking     Nutrition:  Doesn't feel like she's done as well on her diet recently  Notes this is a trend where she does well for a while and then gets off track    Physical Activity:   Still going to the pool regularly  Plans to do something for activity this fall        The 10-year ASCVD risk score (Marie BECKMAN, et al., 2019) is: 41.8%    Values used to calculate the score:      Age: 78 years      Sex: Female      Is Non- : No      Diabetic: Yes      Tobacco smoker: No      Systolic Blood Pressure: 124 mmHg      Is BP treated: Yes      HDL Cholesterol: 59 MG/DL      Total Cholesterol: 177 MG/DL     Vitals:  Wt Readings from Last 3 Encounters:   05/28/24 129 kg (284 lb 8 oz)   03/01/24 (!) 140.3 kg (309 lb 3.2 oz)   11/28/23 (!) 140.3 kg  (309 lb 6.4 oz)     BP Readings from Last 3 Encounters:   05/28/24 124/62   03/01/24 (!) 160/90   11/28/23 104/60     Pulse Readings from Last 3 Encounters:   05/28/24 83   03/01/24 78   11/28/23 80       Lab Results   Component Value Date/Time    LDL 96.4 05/28/2024 11:27 AM    .8 11/28/2023 03:59 PM     Hemoglobin A1C   Date Value Ref Range Status   05/28/2024 6.0 (H) 4.0 - 5.6 % Final     Comment:     (NOTE)  HbA1C Interpretive Ranges  <5.7              Normal  5.7 - 6.4         Consider Prediabetes  >6.5              Consider Diabetes           Estimated Creatinine Clearance: 63 mL/min (based on SCr of 0.96 mg/dL).      Medication reconciliation was completed during the visit.    There are no discontinued medications.    Patient verbalized understanding of the information presented and all of the patient’s questions were answered.  AVS was handed to the patient. Patient advised to call the office with any additional questions or concerns.    Thank you for the consult,  Lauren Youssef, PharmD, BCPS, CDCES      For Pharmacy Admin Tracking Only    Program: Medical Group  CPA in place:  Yes  Recommendation Provided To: Patient/Caregiver: 2 via In person  Intervention Detail: Patient Access Assistance/Sample Provided and Scheduled Appointment  Intervention Accepted By: Patient/Caregiver: 2  Gap Closed?: No   Time Spent (min): 30

## 2024-08-28 NOTE — PATIENT INSTRUCTIONS
Before the next visit:    Call your prescription insurance company and ask the following questions:  What would your cost be for Ozempic right now?   DOSE: 0.25mg once weekly  What would the cost be when you fall into the coverage gap?   Where are you at as far as your spending toward the coverage gap?  What would the cost be for Trulicity?  DOSE: 0.75mg once weekly  What would the cost be when you fall into the coverage gap?   What would the cost be for Jardiance?  DOSE: 25mg once daily  What would the cost be when you fall into the coverage gap?   What would the cost be for Farxiga?  DOSE: 10mg once daily  What would the cost be when you fall into the coverage gap?

## 2024-08-29 DIAGNOSIS — I10 ESSENTIAL (PRIMARY) HYPERTENSION: ICD-10-CM

## 2024-08-29 DIAGNOSIS — E78.5 HYPERLIPIDEMIA, UNSPECIFIED: ICD-10-CM

## 2024-08-29 DIAGNOSIS — F34.1 DYSTHYMIC DISORDER: ICD-10-CM

## 2024-08-29 RX ORDER — ATORVASTATIN CALCIUM 20 MG/1
20 TABLET, FILM COATED ORAL DAILY
Qty: 90 TABLET | Refills: 1 | Status: SHIPPED | OUTPATIENT
Start: 2024-08-29

## 2024-08-29 RX ORDER — OLMESARTAN MEDOXOMIL 20 MG/1
20 TABLET ORAL DAILY
Qty: 90 TABLET | Refills: 1 | Status: SHIPPED | OUTPATIENT
Start: 2024-08-29

## 2024-09-26 ENCOUNTER — TELEPHONE (OUTPATIENT)
Dept: PHARMACY | Facility: CLINIC | Age: 79
End: 2024-09-26

## 2024-11-23 NOTE — PROGRESS NOTES
Harriet Mcmillan (:  1945) is a 79 y.o. female,Established patient, here for evaluation of the following chief complaint(s):  Medicare AWV (Patient is fasting)          Diagnosis Orders   1. Medicare annual wellness visit, subsequent        2. Type 2 diabetes mellitus without complication, without long-term current use of insulin (HCC)  Microalbumin / Creatinine Urine Ratio    Hemoglobin A1C      3. Essential (primary) hypertension  Comprehensive Metabolic Panel    CBC      4. Hyperlipidemia LDL goal <100  Lipid Panel      5. Obstructive sleep apnea (adult) (pediatric)        6. Sleep disorder, unspecified        7. Morbid (severe) obesity due to excess calories        8. Lymphedema  [facility], [place]      9. Dysthymic disorder           Metformin ? ozempic  Benicar   Lipitor  CPAP  Compression stockings/ lymphedema clinic  prozac  Assessment & Plan  1. Fatigue.  Her vital signs are within normal limits and oxygen saturation is excellent. The fatigue could be a residual effect of her recent COVID-19 infection. Notably, she has lost 20 pounds since her last visit, which could be contributing to her improved oxygenation compared to May 2024. Physical therapy could potentially enhance her condition. She was advised to maintain a 50% activity level. A referral for physical therapy will be provided. She was encouraged to resume using her CPAP machine.    2. Diabetes Mellitus.  She is currently taking metformin once a day. She was advised to check with her insurance about the cost of Ozempic, as it could help with weight loss and improve her condition.    3. Hypertension.  She is taking Benicar daily. Her blood pressure is well-controlled at 135/66.    4. Hyperlipidemia.  She is taking atorvastatin for high cholesterol. Lab orders will be given to check her cholesterol levels.    5. Depression.  She is on a stable dose of Prozac, which seems to be effective.    6. Asthma.  She uses Symbicort

## 2024-11-25 ENCOUNTER — OFFICE VISIT (OUTPATIENT)
Facility: CLINIC | Age: 79
End: 2024-11-25

## 2024-11-25 VITALS
OXYGEN SATURATION: 95 % | DIASTOLIC BLOOD PRESSURE: 66 MMHG | HEART RATE: 74 BPM | BODY MASS INDEX: 58.58 KG/M2 | RESPIRATION RATE: 16 BRPM | HEIGHT: 59 IN | TEMPERATURE: 98 F | WEIGHT: 290.6 LBS | SYSTOLIC BLOOD PRESSURE: 135 MMHG

## 2024-11-25 DIAGNOSIS — F34.1 DYSTHYMIC DISORDER: ICD-10-CM

## 2024-11-25 DIAGNOSIS — E78.5 HYPERLIPIDEMIA LDL GOAL <100: ICD-10-CM

## 2024-11-25 DIAGNOSIS — E11.9 TYPE 2 DIABETES MELLITUS WITHOUT COMPLICATION, WITHOUT LONG-TERM CURRENT USE OF INSULIN (HCC): ICD-10-CM

## 2024-11-25 DIAGNOSIS — I89.0 LYMPHEDEMA: ICD-10-CM

## 2024-11-25 DIAGNOSIS — G47.33 OBSTRUCTIVE SLEEP APNEA (ADULT) (PEDIATRIC): ICD-10-CM

## 2024-11-25 DIAGNOSIS — I10 ESSENTIAL (PRIMARY) HYPERTENSION: ICD-10-CM

## 2024-11-25 DIAGNOSIS — R26.89 BALANCE DISORDER: ICD-10-CM

## 2024-11-25 DIAGNOSIS — E66.01 MORBID (SEVERE) OBESITY DUE TO EXCESS CALORIES: ICD-10-CM

## 2024-11-25 DIAGNOSIS — G47.9 SLEEP DISORDER, UNSPECIFIED: ICD-10-CM

## 2024-11-25 DIAGNOSIS — Z00.00 MEDICARE ANNUAL WELLNESS VISIT, SUBSEQUENT: Primary | ICD-10-CM

## 2024-11-25 LAB
ALBUMIN SERPL-MCNC: 3.5 G/DL (ref 3.5–5)
ALBUMIN/GLOB SERPL: 0.9 (ref 1.1–2.2)
ALP SERPL-CCNC: 83 U/L (ref 45–117)
ALT SERPL-CCNC: 37 U/L (ref 12–78)
ANION GAP SERPL CALC-SCNC: 7 MMOL/L (ref 2–12)
AST SERPL-CCNC: 36 U/L (ref 15–37)
BILIRUB SERPL-MCNC: 0.7 MG/DL (ref 0.2–1)
BUN SERPL-MCNC: 17 MG/DL (ref 6–20)
BUN/CREAT SERPL: 22 (ref 12–20)
CALCIUM SERPL-MCNC: 9.3 MG/DL (ref 8.5–10.1)
CHLORIDE SERPL-SCNC: 106 MMOL/L (ref 97–108)
CHOLEST SERPL-MCNC: 186 MG/DL
CO2 SERPL-SCNC: 28 MMOL/L (ref 21–32)
CREAT SERPL-MCNC: 0.78 MG/DL (ref 0.55–1.02)
CREAT UR-MCNC: 331 MG/DL
ERYTHROCYTE [DISTWIDTH] IN BLOOD BY AUTOMATED COUNT: 13.1 % (ref 11.5–14.5)
EST. AVERAGE GLUCOSE BLD GHB EST-MCNC: 131 MG/DL
GLOBULIN SER CALC-MCNC: 3.8 G/DL (ref 2–4)
GLUCOSE SERPL-MCNC: 134 MG/DL (ref 65–100)
HBA1C MFR BLD: 6.2 % (ref 4–5.6)
HCT VFR BLD AUTO: 44.1 % (ref 35–47)
HDLC SERPL-MCNC: 67 MG/DL
HDLC SERPL: 2.8 (ref 0–5)
HGB BLD-MCNC: 14.1 G/DL (ref 11.5–16)
LDLC SERPL CALC-MCNC: 95.2 MG/DL (ref 0–100)
MCH RBC QN AUTO: 29.9 PG (ref 26–34)
MCHC RBC AUTO-ENTMCNC: 32 G/DL (ref 30–36.5)
MCV RBC AUTO: 93.4 FL (ref 80–99)
MICROALBUMIN UR-MCNC: 7.57 MG/DL
MICROALBUMIN/CREAT UR-RTO: 23 MG/G (ref 0–30)
NRBC # BLD: 0 K/UL (ref 0–0.01)
NRBC BLD-RTO: 0 PER 100 WBC
PLATELET # BLD AUTO: 269 K/UL (ref 150–400)
PMV BLD AUTO: 10.8 FL (ref 8.9–12.9)
POTASSIUM SERPL-SCNC: 4.7 MMOL/L (ref 3.5–5.1)
PROT SERPL-MCNC: 7.3 G/DL (ref 6.4–8.2)
RBC # BLD AUTO: 4.72 M/UL (ref 3.8–5.2)
SODIUM SERPL-SCNC: 141 MMOL/L (ref 136–145)
TRIGL SERPL-MCNC: 119 MG/DL
VLDLC SERPL CALC-MCNC: 23.8 MG/DL
WBC # BLD AUTO: 7.5 K/UL (ref 3.6–11)

## 2024-11-25 ASSESSMENT — PATIENT HEALTH QUESTIONNAIRE - PHQ9
SUM OF ALL RESPONSES TO PHQ QUESTIONS 1-9: 0
10. IF YOU CHECKED OFF ANY PROBLEMS, HOW DIFFICULT HAVE THESE PROBLEMS MADE IT FOR YOU TO DO YOUR WORK, TAKE CARE OF THINGS AT HOME, OR GET ALONG WITH OTHER PEOPLE: NOT DIFFICULT AT ALL
5. POOR APPETITE OR OVEREATING: NOT AT ALL
9. THOUGHTS THAT YOU WOULD BE BETTER OFF DEAD, OR OF HURTING YOURSELF: NOT AT ALL
2. FEELING DOWN, DEPRESSED OR HOPELESS: NOT AT ALL
SUM OF ALL RESPONSES TO PHQ QUESTIONS 1-9: 0
SUM OF ALL RESPONSES TO PHQ9 QUESTIONS 1 & 2: 0
1. LITTLE INTEREST OR PLEASURE IN DOING THINGS: NOT AT ALL
8. MOVING OR SPEAKING SO SLOWLY THAT OTHER PEOPLE COULD HAVE NOTICED. OR THE OPPOSITE, BEING SO FIGETY OR RESTLESS THAT YOU HAVE BEEN MOVING AROUND A LOT MORE THAN USUAL: NOT AT ALL
3. TROUBLE FALLING OR STAYING ASLEEP: NOT AT ALL
6. FEELING BAD ABOUT YOURSELF - OR THAT YOU ARE A FAILURE OR HAVE LET YOURSELF OR YOUR FAMILY DOWN: NOT AT ALL
SUM OF ALL RESPONSES TO PHQ QUESTIONS 1-9: 0
SUM OF ALL RESPONSES TO PHQ QUESTIONS 1-9: 0
7. TROUBLE CONCENTRATING ON THINGS, SUCH AS READING THE NEWSPAPER OR WATCHING TELEVISION: NOT AT ALL
4. FEELING TIRED OR HAVING LITTLE ENERGY: NOT AT ALL

## 2024-11-25 ASSESSMENT — LIFESTYLE VARIABLES
HOW MANY STANDARD DRINKS CONTAINING ALCOHOL DO YOU HAVE ON A TYPICAL DAY: PATIENT DOES NOT DRINK
HOW OFTEN DO YOU HAVE A DRINK CONTAINING ALCOHOL: MONTHLY OR LESS

## 2024-11-25 NOTE — PATIENT INSTRUCTIONS
Learning About Being Active as an Older Adult  Why is being active important as you get older?     Being active is one of the best things you can do for your health. And it's never too late to start. Being active--or getting active, if you aren't already--has definite benefits. It can:  Give you more energy,  Keep your mind sharp.  Improve balance to reduce your risk of falls.  Help you manage chronic illness with fewer medicines.  No matter how old you are, how fit you are, or what health problems you have, there is a form of activity that will work for you. And the more physical activity you can do, the better your overall health will be.  What kinds of activity can help you stay healthy?  Being more active will make your daily activities easier. Physical activity includes planned exercise and things you do in daily life. There are four types of activity:  Aerobic.  Doing aerobic activity makes your heart and lungs strong.  Includes walking, dancing, and gardening.  Aim for at least 2½ hours spread throughout the week.  It improves your energy and can help you sleep better.  Muscle-strengthening.  This type of activity can help maintain muscle and strengthen bones.  Includes climbing stairs, using resistance bands, and lifting or carrying heavy loads.  Aim for at least twice a week.  It can help protect the knees and other joints.  Stretching.  Stretching gives you better range of motion in joints and muscles.  Includes upper arm stretches, calf stretches, and gentle yoga.  Aim for at least twice a week, preferably after your muscles are warmed up from other activities.  It can help you function better in daily life.  Balancing.  This helps you stay coordinated and have good posture.  Includes heel-to-toe walking, allie chi, and certain types of yoga.  Aim for at least 3 days a week.  It can reduce your risk of falling.  Even if you have a hard time meeting the recommendations, it's better to be more active

## 2025-03-06 ENCOUNTER — TELEPHONE (OUTPATIENT)
Facility: CLINIC | Age: 80
End: 2025-03-06

## 2025-03-06 ENCOUNTER — OFFICE VISIT (OUTPATIENT)
Facility: CLINIC | Age: 80
End: 2025-03-06

## 2025-03-06 VITALS
HEART RATE: 90 BPM | OXYGEN SATURATION: 95 % | DIASTOLIC BLOOD PRESSURE: 72 MMHG | RESPIRATION RATE: 16 BRPM | TEMPERATURE: 98.5 F | WEIGHT: 293 LBS | HEIGHT: 59 IN | BODY MASS INDEX: 59.07 KG/M2 | SYSTOLIC BLOOD PRESSURE: 138 MMHG

## 2025-03-06 DIAGNOSIS — L03.119 CELLULITIS OF LOWER EXTREMITY, UNSPECIFIED LATERALITY: Primary | ICD-10-CM

## 2025-03-06 DIAGNOSIS — I10 PRIMARY HYPERTENSION: ICD-10-CM

## 2025-03-06 DIAGNOSIS — I89.0 LYMPHEDEMA: ICD-10-CM

## 2025-03-06 DIAGNOSIS — E11.9 TYPE 2 DIABETES MELLITUS WITHOUT COMPLICATION, WITHOUT LONG-TERM CURRENT USE OF INSULIN (HCC): ICD-10-CM

## 2025-03-06 RX ORDER — MUPIROCIN 20 MG/G
OINTMENT TOPICAL
Qty: 22 G | Refills: 0 | Status: SHIPPED | OUTPATIENT
Start: 2025-03-06 | End: 2025-03-13

## 2025-03-06 RX ORDER — CEPHALEXIN 500 MG/1
500 CAPSULE ORAL 4 TIMES DAILY
Qty: 28 CAPSULE | Refills: 0 | Status: SHIPPED | OUTPATIENT
Start: 2025-03-06 | End: 2025-03-13

## 2025-03-06 ASSESSMENT — ENCOUNTER SYMPTOMS
CONSTIPATION: 0
COUGH: 0
VOMITING: 0
EYES NEGATIVE: 1
SINUS PAIN: 0
NAUSEA: 0
SHORTNESS OF BREATH: 0
RESPIRATORY NEGATIVE: 1
SINUS PRESSURE: 0
EYE REDNESS: 0
ABDOMINAL PAIN: 0
EYE PAIN: 0
GASTROINTESTINAL NEGATIVE: 1
COLOR CHANGE: 1
BLOOD IN STOOL: 0
DIARRHEA: 0
CHEST TIGHTNESS: 0
BACK PAIN: 0
RHINORRHEA: 0

## 2025-03-06 NOTE — TELEPHONE ENCOUNTER
Spoke to pt informed pt that discussion about taking ozempic can be discussed during her follow up appt with Michelle LEDESMA. Pt verbally acknowledged understanding

## 2025-03-06 NOTE — PROGRESS NOTES
Assessment and Plan     1. Cellulitis of lower extremity, unspecified laterality: Abx treatment with Cephalexin started, mode of use discussed. Recommended to keep area clean and dry. Topical Mupirocin recommended. Will reassess in a week. Pt agreed with plan  -     cephALEXin (KEFLEX) 500 MG capsule; Take 1 capsule by mouth 4 times daily for 7 days, Disp-28 capsule, R-0Normal  -     mupirocin (BACTROBAN) 2 % ointment; Apply topically 3 times daily., Disp-22 g, R-0, Normal  2. Lymphedema: Has an appointment at lymphedema clinic next month. Low sodium diet and elevating lower extremities when sitting/lying down recommended.   3. Primary hypertension: At goal, continue with Olmesartan 20 mg daily  4. Type 2 diabetes mellitus without complication, without long-term current use of insulin (HCC): A1C 6.2% Nov. Of 2024. Continue with Metformin 500 mg daily     BP Readings from Last 3 Encounters:   03/06/25 138/72   11/25/24 135/66   05/28/24 124/62   Recent Relevant Labs:    Lab Results   Component Value Date    LABA1C 6.2 (H) 11/25/2024    LABA1C 6.0 (H) 05/28/2024    LABA1C 6.6 (H) 11/28/2023     Lab Results   Component Value Date    CREATININE 0.78 11/25/2024     Hemoglobin A1C   Date Value Ref Range Status   11/25/2024 6.2 (H) 4.0 - 5.6 % Final     Comment:     (NOTE)  HbA1C Interpretive Ranges  <5.7              Normal  5.7 - 6.4         Consider Prediabetes  >6.5              Consider Diabetes        Benefits, risks, possible drug interactions, and side effects of all new medications were reviewed with the patient. Pt verbalized understanding.    An electronic signature was used to authenticate this note.  Michelle Liang, APRN - CNP  3/7/2025      Follow-up and Dispositions    Return in about 1 week (around 3/13/2025).          History of Present Illness   Chief Complaint     Harriet Mcimllan is a 79 y.o. female here for had concerns including Wound Check.   Mrs. Mcmillan presents today accompanied by son with

## 2025-03-06 NOTE — TELEPHONE ENCOUNTER
The patient is requesting a call back to discuss an PA for a medication that was recommended a year ago. Ozempic. Psr offered an appointment she decline and stated that she will like to be advised.  [phone]

## 2025-03-13 ENCOUNTER — OFFICE VISIT (OUTPATIENT)
Facility: CLINIC | Age: 80
End: 2025-03-13

## 2025-03-13 VITALS
TEMPERATURE: 98.3 F | BODY MASS INDEX: 59.07 KG/M2 | HEIGHT: 59 IN | RESPIRATION RATE: 16 BRPM | DIASTOLIC BLOOD PRESSURE: 70 MMHG | SYSTOLIC BLOOD PRESSURE: 124 MMHG | HEART RATE: 77 BPM | WEIGHT: 293 LBS | OXYGEN SATURATION: 95 %

## 2025-03-13 DIAGNOSIS — L97.911 ULCER OF RIGHT LOWER EXTREMITY, LIMITED TO BREAKDOWN OF SKIN (HCC): ICD-10-CM

## 2025-03-13 DIAGNOSIS — I89.0 LYMPHEDEMA: ICD-10-CM

## 2025-03-13 DIAGNOSIS — L03.119 CELLULITIS OF LOWER EXTREMITY, UNSPECIFIED LATERALITY: Primary | ICD-10-CM

## 2025-03-13 DIAGNOSIS — I10 PRIMARY HYPERTENSION: ICD-10-CM

## 2025-03-13 RX ORDER — MUPIROCIN 20 MG/G
OINTMENT TOPICAL
Qty: 15 G | Refills: 0 | Status: SHIPPED | OUTPATIENT
Start: 2025-03-13 | End: 2025-03-20

## 2025-03-13 RX ORDER — CEPHALEXIN 500 MG/1
500 CAPSULE ORAL 4 TIMES DAILY
Qty: 12 CAPSULE | Refills: 0 | Status: SHIPPED | OUTPATIENT
Start: 2025-03-13 | End: 2025-03-16

## 2025-03-13 SDOH — ECONOMIC STABILITY: FOOD INSECURITY: WITHIN THE PAST 12 MONTHS, THE FOOD YOU BOUGHT JUST DIDN'T LAST AND YOU DIDN'T HAVE MONEY TO GET MORE.: NEVER TRUE

## 2025-03-13 SDOH — ECONOMIC STABILITY: FOOD INSECURITY: WITHIN THE PAST 12 MONTHS, YOU WORRIED THAT YOUR FOOD WOULD RUN OUT BEFORE YOU GOT MONEY TO BUY MORE.: NEVER TRUE

## 2025-03-13 ASSESSMENT — ENCOUNTER SYMPTOMS
SINUS PAIN: 0
CONSTIPATION: 0
SINUS PRESSURE: 0
ABDOMINAL PAIN: 0
BACK PAIN: 0
DIARRHEA: 0
RESPIRATORY NEGATIVE: 1
RHINORRHEA: 0
EYES NEGATIVE: 1
GASTROINTESTINAL NEGATIVE: 1
COUGH: 0
SHORTNESS OF BREATH: 0
EYE PAIN: 0
CHEST TIGHTNESS: 0
BLOOD IN STOOL: 0
EYE REDNESS: 0
NAUSEA: 0
VOMITING: 0
COLOR CHANGE: 1

## 2025-03-13 ASSESSMENT — PATIENT HEALTH QUESTIONNAIRE - PHQ9
7. TROUBLE CONCENTRATING ON THINGS, SUCH AS READING THE NEWSPAPER OR WATCHING TELEVISION: NOT AT ALL
5. POOR APPETITE OR OVEREATING: SEVERAL DAYS
6. FEELING BAD ABOUT YOURSELF - OR THAT YOU ARE A FAILURE OR HAVE LET YOURSELF OR YOUR FAMILY DOWN: SEVERAL DAYS
1. LITTLE INTEREST OR PLEASURE IN DOING THINGS: SEVERAL DAYS
8. MOVING OR SPEAKING SO SLOWLY THAT OTHER PEOPLE COULD HAVE NOTICED. OR THE OPPOSITE, BEING SO FIGETY OR RESTLESS THAT YOU HAVE BEEN MOVING AROUND A LOT MORE THAN USUAL: NOT AT ALL
10. IF YOU CHECKED OFF ANY PROBLEMS, HOW DIFFICULT HAVE THESE PROBLEMS MADE IT FOR YOU TO DO YOUR WORK, TAKE CARE OF THINGS AT HOME, OR GET ALONG WITH OTHER PEOPLE: SOMEWHAT DIFFICULT
4. FEELING TIRED OR HAVING LITTLE ENERGY: SEVERAL DAYS
SUM OF ALL RESPONSES TO PHQ QUESTIONS 1-9: 7
2. FEELING DOWN, DEPRESSED OR HOPELESS: SEVERAL DAYS
SUM OF ALL RESPONSES TO PHQ QUESTIONS 1-9: 7
9. THOUGHTS THAT YOU WOULD BE BETTER OFF DEAD, OR OF HURTING YOURSELF: NOT AT ALL
3. TROUBLE FALLING OR STAYING ASLEEP: MORE THAN HALF THE DAYS

## 2025-03-13 NOTE — PROGRESS NOTES
Assessment and Plan     1. Cellulitis of lower extremity, unspecified laterality: Some improvement noted, will extend Cephalexin PO for 3 month days. Recommended to keep area dry and clear. SXS to return discussed.   -     cephALEXin (KEFLEX) 500 MG capsule; Take 1 capsule by mouth 4 times daily for 3 days, Disp-12 capsule, R-0Normal  -     mupirocin (BACTROBAN) 2 % ointment; Apply topically 3 times daily., Disp-15 g, R-0, Normal  -     [facility]  2. Ulcer of right lower extremity, limited to breakdown of skin (HCC): Referred to wound clinic given  -     [facility]  3. Lymphedema: Will follow at lymphedema clinic. Compression stocking recommended  4. Primary hypertension: At goal. Continue with Olmesartan 20 mg daily     BP Readings from Last 3 Encounters:   03/13/25 124/70   03/06/25 138/72   11/25/24 135/66     Benefits, risks, possible drug interactions, and side effects of all new medications were reviewed with the patient. Pt verbalized understanding.    An electronic signature was used to authenticate this note.  Michelle Liang, APRN - CNP  3/13/2025      Follow-up and Dispositions    Return if symptoms worsen or fail to improve.          History of Present Illness   Chief Complaint     Harriet Mcmillan is a 79 y.o. female here for had concerns including Follow-up (Cellulitis of lower extremity, unspecified laterality).   Mrs. Mcmillan presents today for follow up on cellulitis  of lower extremities. She was seen last week and started on Cephalexin and Mupirocin ointment which she is taking with compliance. Has one more dose of Cephalexin left. Reports erythema has improved, and R posterior ulcer seems to have stopped oozing. PMH includes lymphedema, HTN, type II DM, hyperlipidemia, dysthymia. Denies fever, chills, fatigue.     Review of Systems  Review of Systems   Constitutional: Negative.  Negative for chills, fatigue, fever and

## 2025-03-17 ENCOUNTER — HOSPITAL ENCOUNTER (OUTPATIENT)
Facility: HOSPITAL | Age: 80
Discharge: HOME | End: 2025-03-17
Attending: PODIATRIST
Payer: MEDICARE

## 2025-03-17 VITALS
SYSTOLIC BLOOD PRESSURE: 151 MMHG | HEART RATE: 75 BPM | TEMPERATURE: 98.4 F | DIASTOLIC BLOOD PRESSURE: 66 MMHG | RESPIRATION RATE: 20 BRPM

## 2025-03-17 DIAGNOSIS — L97.922 NON-PRESSURE CHRONIC ULCER OF LOWER LEG WITH FAT LAYER EXPOSED, LEFT (HCC): Primary | ICD-10-CM

## 2025-03-17 PROCEDURE — 99213 OFFICE O/P EST LOW 20 MIN: CPT

## 2025-03-17 PROCEDURE — 11042 DBRDMT SUBQ TIS 1ST 20SQCM/<: CPT

## 2025-03-17 RX ORDER — GINSENG 100 MG
CAPSULE ORAL PRN
OUTPATIENT
Start: 2025-03-17

## 2025-03-17 RX ORDER — CLOBETASOL PROPIONATE 0.5 MG/G
OINTMENT TOPICAL PRN
OUTPATIENT
Start: 2025-03-17

## 2025-03-17 RX ORDER — BETAMETHASONE DIPROPIONATE 0.5 MG/G
CREAM TOPICAL PRN
OUTPATIENT
Start: 2025-03-17

## 2025-03-17 RX ORDER — LIDOCAINE HYDROCHLORIDE 40 MG/ML
SOLUTION TOPICAL PRN
OUTPATIENT
Start: 2025-03-17

## 2025-03-17 RX ORDER — MUPIROCIN 20 MG/G
OINTMENT TOPICAL PRN
OUTPATIENT
Start: 2025-03-17

## 2025-03-17 RX ORDER — GENTAMICIN SULFATE 1 MG/G
OINTMENT TOPICAL PRN
OUTPATIENT
Start: 2025-03-17

## 2025-03-17 RX ORDER — TRIAMCINOLONE ACETONIDE 1 MG/G
OINTMENT TOPICAL PRN
OUTPATIENT
Start: 2025-03-17

## 2025-03-17 RX ORDER — LIDOCAINE 50 MG/G
OINTMENT TOPICAL PRN
OUTPATIENT
Start: 2025-03-17

## 2025-03-17 RX ORDER — BACITRACIN ZINC AND POLYMYXIN B SULFATE 500; 1000 [USP'U]/G; [USP'U]/G
OINTMENT TOPICAL PRN
OUTPATIENT
Start: 2025-03-17

## 2025-03-17 RX ORDER — NEOMYCIN/BACITRACIN/POLYMYXINB 3.5-400-5K
OINTMENT (GRAM) TOPICAL PRN
OUTPATIENT
Start: 2025-03-17

## 2025-03-17 RX ORDER — LIDOCAINE HYDROCHLORIDE 20 MG/ML
JELLY TOPICAL PRN
OUTPATIENT
Start: 2025-03-17

## 2025-03-17 RX ORDER — LIDOCAINE 40 MG/G
CREAM TOPICAL PRN
OUTPATIENT
Start: 2025-03-17

## 2025-03-17 RX ORDER — SODIUM CHLOR/HYPOCHLOROUS ACID 0.033 %
SOLUTION, IRRIGATION IRRIGATION PRN
OUTPATIENT
Start: 2025-03-17

## 2025-03-17 RX ORDER — SILVER SULFADIAZINE 10 MG/G
CREAM TOPICAL PRN
OUTPATIENT
Start: 2025-03-17

## 2025-03-17 ASSESSMENT — PAIN SCALES - GENERAL: PAINLEVEL_OUTOF10: 7

## 2025-03-17 ASSESSMENT — PAIN DESCRIPTION - LOCATION: LOCATION: LEG

## 2025-03-17 ASSESSMENT — PAIN DESCRIPTION - DESCRIPTORS: DESCRIPTORS: SORE

## 2025-03-17 ASSESSMENT — PAIN DESCRIPTION - ORIENTATION: ORIENTATION: LEFT;RIGHT

## 2025-03-17 NOTE — WOUND CARE
Home Health Referral for skilled Nursing wound care     ACCEPTED START OF CARE TO BE 3/20    Company:     [employer]    Ordering Center:     [facility]  [address]  [place] [address] [phone]  WOUND CARE [phone]  FAX NUMBER [phone]    Patient Information:      Chapin Dwyer  [address]  [address]  [place] [facility] [address] [phone]   : 1945  AGE: 79 y.o.     GENDER: female   TODAYS DATE:  3/17/2025    Insurance:      PRIMARY INSURANCE:  [id number] - (Medicare Managed)    Payer/Plan Subscr  Sex Relation Sub. Ins. ID Effective Group Num   1. 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

## 2025-03-17 NOTE — WOUND CARE
[facility]   Wound Care and Hyper[facility]ic Oxygen Therapy Center   Medical Staff Progress Note     Harriet Mcmillan  MEDICAL RECORD NUMBER:  [id number]  AGE: 79 y.o.   GENDER: female  : 1945  EPISODE DATE:  3/17/2025    Chief complaint and reason for visit:     Chief Complaint   Patient presents with    Wound Check     Bilateral lower legs      Patient presents today with chief complaint of bilateral lower leg wounds. Patient has a history of lymphedema and has an appointment coming up at the clinic in a month. She states the wounds started recently and she has been applying antibiotic ointment.     Medical Decision Making:     Problem List Items Addressed This Visit    None      Wounds and Treatment Plan:      O  Wound 25 Leg Left;Lower #1 circumferential (Active)   Wound Image    25   Wound Etiology Venous 25   Wound Cleansed Soap and water 25   Dressing/Treatment Gauze dressing/dressing sponge;Xeroform 25   Wound Length (cm) 21 cm 25   Wound Width (cm) 60 cm 25   Wound Depth (cm) 0.1 cm 25   Wound Surface Area (cm^2) 1260 cm^2 25   Wound Volume (cm^3) 126 cm^3 25   Post-Procedure Length (cm) 21 cm 25 0912   Post-Procedure Width (cm) 60 cm 25   Post-Procedure Depth (cm) 0.2 cm 25   Post-Procedure Surface Area (cm^2) 1260 cm^2 25   Post-Procedure Volume (cm^3) 252 cm^3 25   Wound Assessment Slough;Pink/red;Other (Comment) 25   Drainage Amount Moderate (25-50%) 25   Drainage Description Serous 25   Odor None 25   Linda-wound Assessment Blanchable erythema;Maceration 25   Margins Undefined edges 25   Number of days: 0       Wound 25 Leg Right;Lower #2 circumferential (Active)   Wound Image     25   Wound Etiology Venous 25 0912   Wound Cleansed Soap and

## 2025-03-17 NOTE — FLOWSHEET NOTE
03/17/25 0829   Anesthetic   Anesthetic 4% Lidocaine Liquid Topical   Right Leg Edema Point of Measurement   Leg circumference 69 cm   Ankle circumference 39.2 cm   Left Leg Edema Point of Measurement   Leg circumference 63 cm   Ankle circumference 39 cm   RLE Neurovascular Assessment   Capillary Refill Less than/Equal to 3 seconds   Color Pink   Temperature Cool   R Pedal Pulse Doppler   LLE Neurovascular Assessment   Capillary Refill Less than/Equal to 3 seconds   Color Pink   Temperature Cool   L Pedal Pulse Doppler   Wound 03/17/25 Leg Left;Lower #1 circumferential   Date First Assessed/Time First Assessed: 03/17/25 0838   Present on Original Admission: Yes  Location: Leg  Wound Location Orientation: Left;Lower  Wound Description (Comments): #1 circumferential   Wound Image     Wound Cleansed Soap and water   Wound Length (cm) 21 cm   Wound Width (cm) 60 cm   Wound Depth (cm) 0.1 cm   Wound Surface Area (cm^2) 1260 cm^2   Wound Volume (cm^3) 126 cm^3   Wound Assessment Slough;Pink/red;Other (Comment)  (dried exudate)   Drainage Amount Moderate (25-50%)   Drainage Description Serous   Odor None   Linda-wound Assessment Blanchable erythema;Maceration   Margins Undefined edges   Wound 03/17/25 Leg Right;Lower #2 circumferential   Date First Assessed/Time First Assessed: 03/17/25 0838   Present on Original Admission: Yes  Location: Leg  Wound Location Orientation: Right;Lower  Wound Description (Comments): #2 circumferential   Wound Image      Wound Cleansed Soap and water   Wound Length (cm) 19.5 cm   Wound Width (cm) 64 cm   Wound Depth (cm) 0.1 cm   Wound Surface Area (cm^2) 1248 cm^2   Wound Volume (cm^3) 124.8 cm^3   Wound Assessment Slough;Pink/red;Other (Comment)  (dried exduate)   Drainage Amount Moderate (25-50%)   Drainage Description Serous   Odor None   Linda-wound Assessment Blanchable erythema;Maceration   Margins Undefined edges   Pain Assessment   Pain Assessment 0-10   Pain Level 7   Pain Location 
signed by Rae Lara LPN on 3/17/2025 at 9:32 AM

## 2025-03-17 NOTE — PATIENT INSTRUCTIONS
Other:     Edema Control: Every morning immediately when getting up should be applied to affected leg(s) from mid foot to knee making sure to cover the heel.  Remove every night before going to bed if desired.  Apply: [] Compression Stocking      []Right Leg           []Left Leg   [] Tubigrip                             [] Right Leg Single Layer  [] Right Leg Double Layer                             [] Left Leg Single Layer [] Left Leg Double Layer      Compression: Do not get leg(s) with wrap wet.  If wraps become too tight call the center or completely remove the wrap.  Apply: [] Three Layer Compression Wrap  []RightLeg []Left Leg  [] Four layer Compression Wrap      []RightLeg []Left Leg   [] Unna's boot                                  [] Right Leg   [] Left Leg              [x] Two Layer Calamine Wrap Lite         [x] Right Leg   [x] Left Leg       [x] Elevate leg(s) when sitting.   [x] Avoid prolonged standing in one place.    Dietary:  [] Diet as tolerated [x] Diabetic Diet   [x] Increase Protein: examples (Meat, cheese, eggs, greek yogurt, fish, nuts)   [] Rufino Therapeutic Nutrition Powder  [] Other:  [] Dial a Dietician : Call [email or web address] at [phone] enter code (249) when prompted. M-F 9am-5pm EST.     Return Appointment:  [] Nurse Visit at wound center   [x] Return Appointment: With Dr. Ibeth Raza in 1 week(s)  [] Ordered tests:     Electronically signed on 3/17/2025 at 9:08 AM     PLEASE NOTE: IF YOU ARE UNABLE TO OBTAIN WOUND SUPPLIES, CONTINUE TO USE THE SUPPLIES YOU HAVE AVAILABLE UNTIL YOU ARE ABLE TO REACH US. IT IS MOST IMPORTANT TO KEEP THE WOUND COVERED AT ALL TIMES.     Physician Signature:_______________________  Dr. Ibeth Raza

## 2025-03-18 ENCOUNTER — TELEPHONE (OUTPATIENT)
Facility: CLINIC | Age: 80
End: 2025-03-18

## 2025-03-18 ENCOUNTER — PHARMACY VISIT (OUTPATIENT)
Facility: CLINIC | Age: 80
End: 2025-03-18

## 2025-03-18 DIAGNOSIS — E11.9 TYPE 2 DIABETES MELLITUS WITHOUT COMPLICATION, WITHOUT LONG-TERM CURRENT USE OF INSULIN: Primary | ICD-10-CM

## 2025-03-18 NOTE — TELEPHONE ENCOUNTER
*Incoming Call*    Patient requested appointment with Lauren Youssef for medication education (Ozempic)    Spoke to patient at home number and advised them of the previous message.    Patient scheduled a in person appointment .  Appointment scheduled for 3/19/25 at 10:30 am.    [facility]   Ambulatory Pharmacy Clinical   [phone]  Department, toll free: [phone], option 2       For Pharmacy Admin Tracking Only    Program: Medical Group  Gap Closed?: Yes   Time Spent (min): 10

## 2025-03-18 NOTE — PROGRESS NOTES
Pharmacy Progress Note - Diabetes Management    Assessment / Plan:   Diabetes Management:  - Per ADA guidelines, Pt's A1c is at goal of < 7%.   - Current SMBG(s)/CGM trend not evaluated as patient is not checking  STOP metformin 2/2 side effects  Start Ozempic for diabetes control and will help with additional weight loss as a benefit. Also will provide cardiovascular and kidney protection. Patient has severe lymphedema so getting this medication with weight loss benefit is really important  Patient to call when she is able to  Ozempic so she can come in for teaching and first dose administration    Follow up: TBD       S/O: Harriet Mcmillan is a 79 y.o. female referred by Martha Roberson MD for diabetes management.     Current diabetes regimen include(s):    - Metformin ER 500mg -- still having issues -- doesn't go places because of worrying about having an accident/diarrhea    ROS:  Today, Pt endorses:  - Symptoms of Hyperglycemia: none  - Symptoms of Hypoglycemia: none    Blood Glucose Monitoring (BGM) or CGM:  Not checking        The 10-year ASCVD risk score (Marie BECKMAN, et al., 2019) is: 60.4%    Values used to calculate the score:      Age: 79 years      Sex: Female      Is Non- : No      Diabetic: Yes      Tobacco smoker: No      Systolic Blood Pressure: 151 mmHg      Is BP treated: Yes      HDL Cholesterol: 67 MG/DL      Total Cholesterol: 186 MG/DL     Vitals:  Wt Readings from Last 3 Encounters:   03/13/25 (!) 137 kg (302 lb)   03/06/25 (!) 137 kg (302 lb)   11/25/24 131.8 kg (290 lb 9.6 oz)     BP Readings from Last 3 Encounters:   03/17/25 (!) 151/66   03/13/25 124/70   03/06/25 138/72     Pulse Readings from Last 3 Encounters:   03/17/25 75   03/13/25 77   03/06/25 90       Lab Results   Component Value Date/Time    LDL 95.2 11/25/2024 12:17 PM    .8 11/28/2023 03:59 PM     Hemoglobin A1C   Date Value Ref Range Status   11/25/2024 6.2 (H) 4.0 - 5.6 % Final

## 2025-03-19 ENCOUNTER — PHARMACY VISIT (OUTPATIENT)
Facility: CLINIC | Age: 80
End: 2025-03-19

## 2025-03-19 DIAGNOSIS — E11.9 TYPE 2 DIABETES MELLITUS WITHOUT COMPLICATION, WITHOUT LONG-TERM CURRENT USE OF INSULIN: Primary | ICD-10-CM

## 2025-03-19 NOTE — PROGRESS NOTES
Pharmacy Progress Note - Diabetes Management    Assessment / Plan:   Diabetes Management:  - Per ADA guidelines, Pt's A1c is at goal of < 7%.   - Current SMBG(s)/CGM trend not evaluated as patient is not checking  - Educated patient on Ozempic administration, mechanism, side effects and storage  - Patient gave herself first injection in office with no issues  - Would like to come back in 1 week to give 2nd shot in the office to make sure she knows what she's doing      Follow up: 3/26/2025       S/O: Harriet Mcmillan is a 79 y.o. female referred by Martha Roberson MD for diabetes management. Patient here for Ozempic device education. She was able to pick it up yesterday.    Current diabetes regimen include(s):    - was on metformin but we're changing to Ozempic 0.25mg once weekly today    ROS:  Today, Pt endorses:  - Symptoms of Hyperglycemia: none  - Symptoms of Hypoglycemia: none    Blood Glucose Monitoring (BGM) or CGM:  Not checking    Nutrition:  Patient trying to lose weight - watch carbs, increase veggies    Physical Activity:   Limited by lymphedema    The 10-year ASCVD risk score (Marie BECKMAN, et al., 2019) is: 60.4%    Values used to calculate the score:      Age: 79 years      Sex: Female      Is Non- : No      Diabetic: Yes      Tobacco smoker: No      Systolic Blood Pressure: 151 mmHg      Is BP treated: Yes      HDL Cholesterol: 67 MG/DL      Total Cholesterol: 186 MG/DL     Vitals:  Wt Readings from Last 3 Encounters:   03/13/25 (!) 137 kg (302 lb)   03/06/25 (!) 137 kg (302 lb)   11/25/24 131.8 kg (290 lb 9.6 oz)     BP Readings from Last 3 Encounters:   03/17/25 (!) 151/66   03/13/25 124/70   03/06/25 138/72     Pulse Readings from Last 3 Encounters:   03/17/25 75   03/13/25 77   03/06/25 90       Lab Results   Component Value Date/Time    LDL 95.2 11/25/2024 12:17 PM    .8 11/28/2023 03:59 PM     Hemoglobin A1C   Date Value Ref Range Status   11/25/2024 6.2 (H)

## 2025-03-21 ENCOUNTER — HOSPITAL ENCOUNTER (OUTPATIENT)
Facility: HOSPITAL | Age: 80
Discharge: HOME | End: 2025-03-21
Attending: PODIATRIST
Payer: MEDICARE

## 2025-03-21 VITALS
SYSTOLIC BLOOD PRESSURE: 159 MMHG | TEMPERATURE: 97 F | RESPIRATION RATE: 20 BRPM | HEART RATE: 84 BPM | DIASTOLIC BLOOD PRESSURE: 88 MMHG

## 2025-03-21 PROCEDURE — 29581 APPL MULTLAYER CMPRN SYS LEG: CPT

## 2025-03-21 NOTE — FLOWSHEET NOTE
Wound Care Center  Nurse Visit          03/21/25 1401   Right Leg Edema Point of Measurement   Compression Therapy 2 layer compression wrap   Left Leg Edema Point of Measurement   Compression Therapy 2 layer compression wrap   Wound 03/17/25 Leg Left;Lower #1 circumferential   Date First Assessed/Time First Assessed: 03/17/25 0838   Present on Original Admission: Yes  Location: Leg  Wound Location Orientation: Left;Lower  Wound Description (Comments): #1 circumferential   Wound Cleansed Soap and water   Dressing/Treatment Xeroform;Other (comment)  (superabsorber)   Wound 03/17/25 Leg Right;Lower #2 circumferential   Date First Assessed/Time First Assessed: 03/17/25 0838   Present on Original Admission: Yes  Location: Leg  Wound Location Orientation: Right;Lower  Wound Description (Comments): #2 circumferential   Wound Cleansed Soap and water   Dressing/Treatment Xeroform;Other (comment)  (superabsorber)     BP (!) 159/88   Pulse 84   Temp 97 °F (36.1 °C) (Temporal)   Resp 20       Discharge Condition: Stable    Pain: 0    Ambulatory Status: Rolling Walker    Discharge Destination: home    Transportation:car    Accompanied by: SELF    Discharge instructions reviewed with SELF and copy or written instructions have been provided. All questions/concerns have been addressed at this time.     Multilayer Compression Wrap   (Not Unna) Below the Knee    NAME:  Harriet Mcmillan  YOB: 1945  MEDICAL RECORD NUMBER:  [id number]  DATE:  3/21/2025    Multilayer compression wrap: Removed old Multilayer wrap if indicated and wash leg with mild soap/water.  Applied moisturizing agent to dry skin as needed.   Applied primary and secondary dressing as ordered.  Applied multilayered dressing below the knee to right lower leg.  Applied multilayered dressing below the knee to left lower leg.  Instructed patient/caregiver not to remove dressing and to keep it clean and dry.   Instructed patient/caregiver on complications to

## 2025-03-24 ENCOUNTER — HOSPITAL ENCOUNTER (OUTPATIENT)
Facility: HOSPITAL | Age: 80
Discharge: HOME | End: 2025-03-24
Attending: PODIATRIST
Payer: MEDICARE

## 2025-03-24 VITALS
DIASTOLIC BLOOD PRESSURE: 64 MMHG | RESPIRATION RATE: 20 BRPM | SYSTOLIC BLOOD PRESSURE: 140 MMHG | TEMPERATURE: 97.7 F | HEART RATE: 77 BPM

## 2025-03-24 DIAGNOSIS — L97.922 NON-PRESSURE CHRONIC ULCER OF LOWER LEG WITH FAT LAYER EXPOSED, LEFT (HCC): Primary | ICD-10-CM

## 2025-03-24 PROCEDURE — 97597 DBRDMT OPN WND 1ST 20 CM/<: CPT

## 2025-03-24 PROCEDURE — 29581 APPL MULTLAYER CMPRN SYS LEG: CPT

## 2025-03-24 RX ORDER — LIDOCAINE 50 MG/G
OINTMENT TOPICAL PRN
OUTPATIENT
Start: 2025-03-24

## 2025-03-24 RX ORDER — NEOMYCIN/BACITRACIN/POLYMYXINB 3.5-400-5K
OINTMENT (GRAM) TOPICAL PRN
OUTPATIENT
Start: 2025-03-24

## 2025-03-24 RX ORDER — LIDOCAINE HYDROCHLORIDE 20 MG/ML
JELLY TOPICAL PRN
OUTPATIENT
Start: 2025-03-24

## 2025-03-24 RX ORDER — SODIUM CHLOR/HYPOCHLOROUS ACID 0.033 %
SOLUTION, IRRIGATION IRRIGATION PRN
OUTPATIENT
Start: 2025-03-24

## 2025-03-24 RX ORDER — GINSENG 100 MG
CAPSULE ORAL PRN
OUTPATIENT
Start: 2025-03-24

## 2025-03-24 RX ORDER — TRIAMCINOLONE ACETONIDE 1 MG/G
OINTMENT TOPICAL PRN
OUTPATIENT
Start: 2025-03-24

## 2025-03-24 RX ORDER — MUPIROCIN 20 MG/G
OINTMENT TOPICAL PRN
OUTPATIENT
Start: 2025-03-24

## 2025-03-24 RX ORDER — BETAMETHASONE DIPROPIONATE 0.5 MG/G
CREAM TOPICAL PRN
OUTPATIENT
Start: 2025-03-24

## 2025-03-24 RX ORDER — SILVER SULFADIAZINE 10 MG/G
CREAM TOPICAL PRN
OUTPATIENT
Start: 2025-03-24

## 2025-03-24 RX ORDER — BACITRACIN ZINC AND POLYMYXIN B SULFATE 500; 1000 [USP'U]/G; [USP'U]/G
OINTMENT TOPICAL PRN
OUTPATIENT
Start: 2025-03-24

## 2025-03-24 RX ORDER — LIDOCAINE HYDROCHLORIDE 40 MG/ML
SOLUTION TOPICAL PRN
OUTPATIENT
Start: 2025-03-24

## 2025-03-24 RX ORDER — CLOBETASOL PROPIONATE 0.5 MG/G
OINTMENT TOPICAL PRN
OUTPATIENT
Start: 2025-03-24

## 2025-03-24 RX ORDER — LIDOCAINE 40 MG/G
CREAM TOPICAL PRN
OUTPATIENT
Start: 2025-03-24

## 2025-03-24 RX ORDER — GENTAMICIN SULFATE 1 MG/G
OINTMENT TOPICAL PRN
OUTPATIENT
Start: 2025-03-24

## 2025-03-24 ASSESSMENT — PAIN DESCRIPTION - LOCATION: LOCATION: LEG

## 2025-03-24 ASSESSMENT — PAIN DESCRIPTION - DESCRIPTORS: DESCRIPTORS: SORE

## 2025-03-24 ASSESSMENT — PAIN DESCRIPTION - ORIENTATION: ORIENTATION: LEFT;RIGHT

## 2025-03-24 ASSESSMENT — PAIN SCALES - GENERAL: PAINLEVEL_OUTOF10: 3

## 2025-03-24 NOTE — PATIENT INSTRUCTIONS
Discharge Instructions for  [facility]  [address] [place] [address]  Telephone: [phone]     FAX [phone]    Wound Care Center Information: Should you experience any significant changes in your wound(s) or have questions about your wound care, please contact the [facility] at MONDAY - FRIDAY 8:00 am - 4:30.  If you need help with your wound outside these hours and cannot wait until we are again available, contact your PCP or go to the hospital emergency room.     NAME:  Harriet Mcmillan  YOB: 1945  DATE:  3/24/2025    : Alison     [x]  [facility]    Wound Cleansing:   Do not scrub or use excessive force.  Cleanse wound prior to applying a clean dressing with:  [] Vashe - moisten gauze with Vashe, let sit for 2-3 minutes then pat dry    [x] Keep Wound Dry in Shower - may purchase a cast cover at local pharmacy     [x] Cleanse wound with Mild Soap & Water with home health changes   [] May Shower: coordinate with dressing changes, remove dressing 1st, wash with mild soap and water, pat dry, and redress wound right after with a new dressing  [] Do not shower  [] cleanse with baby shampoo lather leave 2-3 minutes then rinse with water    Topical Treatments:  Do not apply lotions, creams, or ointments to wound bed unless directed.   [x] Apply moisturizing lotion such as A&D ointment to skin surrounding the wound prior to dressing change.  [] Other:     Dressings:                   Wound Location Bilateral Lower Legs     Apply Primary Dressing:      [x] Xeroform     Cover and Secure with:  [x] Gauze [] ABD [x] Optilock    [] Drawtex  [] Alfonso [] Kerlix [] Mepilex Border  [] Ace Wrap [] Roll Tape   [x] Other: two layer calamine lite     Change dressing:   [] Daily      [] Every Other Day   [] Three times per week  [x] Once a week in wound care clinic  (Twice a week with home health)  [] Do Not Change

## 2025-03-24 NOTE — FLOWSHEET NOTE
03/24/25 0932   Right Leg Edema Point of Measurement   Leg circumference 64 cm   Ankle circumference 38 cm   Left Leg Edema Point of Measurement   Leg circumference 62.5 cm   Ankle circumference 36 cm   RLE Neurovascular Assessment   Capillary Refill Less than/Equal to 3 seconds   Color Pink   Temperature Warm   R Pedal Pulse +1   LLE Neurovascular Assessment   Capillary Refill Less than/Equal to 3 seconds   Color Pink   Temperature Warm   L Pedal Pulse +1   Wound 03/17/25 Leg Left;Lower #1 circumferential   Date First Assessed/Time First Assessed: 03/17/25 0838   Present on Original Admission: Yes  Location: Leg  Wound Location Orientation: Left;Lower  Wound Description (Comments): #1 circumferential   Wound Image      Wound Cleansed Soap and water   Wound Length (cm) 15 cm   Wound Width (cm) 53.5 cm   Wound Depth (cm) 0.1 cm   Wound Surface Area (cm^2) 802.5 cm^2   Change in Wound Size % (l*w) 36.31   Wound Volume (cm^3) 80.25 cm^3   Wound Healing % 36   Wound Assessment Slough;Pink/red;Other (Comment)  (dried exudate)   Drainage Amount Small (< 25%)   Drainage Description Serous   Odor None   Linda-wound Assessment Blanchable erythema;Maceration   Margins Undefined edges   Wound Thickness Description not for Pressure Injury Partial thickness   Wound 03/17/25 Leg Right;Lower #2 circumferential   Date First Assessed/Time First Assessed: 03/17/25 0838   Present on Original Admission: Yes  Location: Leg  Wound Location Orientation: Right;Lower  Wound Description (Comments): #2 circumferential   Wound Image      Wound Cleansed Soap and water   Wound Length (cm) 17 cm   Wound Width (cm) 59.5 cm   Wound Depth (cm) 0.1 cm   Wound Surface Area (cm^2) 1011.5 cm^2   Change in Wound Size % (l*w) 18.95   Wound Volume (cm^3) 101.15 cm^3   Wound Healing % 19   Wound Assessment Slough;Pink/red;Other (Comment)  (dried exudate)   Drainage Amount Small (< 25%)   Drainage Description Serous   Odor None   Linda-wound Assessment 
   03/24/25 1001   Right Leg Edema Point of Measurement   Compression Therapy 2 layer compression wrap   Left Leg Edema Point of Measurement   Compression Therapy 2 layer compression wrap   Wound 03/17/25 Leg Left;Lower #1 circumferential   Date First Assessed/Time First Assessed: 03/17/25 0838   Present on Original Admission: Yes  Location: Leg  Wound Location Orientation: Left;Lower  Wound Description (Comments): #1 circumferential   Dressing/Treatment Xeroform;Gauze dressing/dressing sponge;ABD  (A&D)   Wound 03/17/25 Leg Right;Lower #2 circumferential   Date First Assessed/Time First Assessed: 03/17/25 0838   Present on Original Admission: Yes  Location: Leg  Wound Location Orientation: Right;Lower  Wound Description (Comments): #2 circumferential   Dressing/Treatment Xeroform;ABD;Gauze dressing/dressing sponge  (A&D)     Multilayer Compression Wrap   (Not Unna) Below the Knee    NAME:  Harriet Mcmillan  YOB: 1945  MEDICAL RECORD NUMBER:  [id number]  DATE:  3/24/2025    Multilayer compression wrap: Removed old Multilayer wrap if indicated and wash leg with mild soap/water.  Applied moisturizing agent to dry skin as needed.   Applied primary and secondary dressing as ordered.  Applied multilayered dressing below the knee to right lower leg.  Applied multilayered dressing below the knee to left lower leg.  Instructed patient/caregiver not to remove dressing and to keep it clean and dry.   Instructed patient/caregiver on complications to report to provider, such as pain, numbness in toes, heavy drainage, and slippage of dressing.  Instructed patient on purpose of compression dressing and on activity and exercise recommendations.      Electronically signed by MARSHAL RODRIGUEZ RN on 3/24/2025 at 10:02 AM      Discharge Condition: Stable    Pain: 2    Ambulatory Status: Walker    Discharge Destination: home    Transportation:car    Accompanied by: SELF    Discharge instructions reviewed with SELF and copy 
NPO

## 2025-03-24 NOTE — WOUND CARE
Procedure Note  Indications: Based on my examination of this patient's wound(s)/ulcer(s) today, debridement is required to promote healing and evaluate the wound base.    Debridement: Excisional Debridement    Using: #15 surgical blade the wound(s)/ulcer(s) was/were debrided down through and including the removal of dermis.  Performed by: Ibeth Raza DPM  Consent obtained: Yes  Time out taken: No:   Pain Control:         Devitalized Tissue Debrided: callus    Pre Debridement Measurements:  Are located in the Wound/Ulcer Documentation Flow Sheet    Diabetic/Pressure/Non Pressure Ulcers only:  Ulcer: Non-Pressure ulcer, limited to breakdown of skin     Wound/Ulcer #: 1  Post Debridement Measurements:  Wound/Ulcer Descriptions are Pre Debridement except measurements:  Wound 03/17/25 Leg Left;Lower #1 circumferential (Active)   Wound Image     03/24/25 0932   Wound Etiology Venous 03/24/25 0951   Wound Cleansed Soap and water 03/24/25 0932   Dressing/Treatment Xeroform;Gauze dressing/dressing sponge;ABD 03/24/25 1001   Wound Length (cm) 15 cm 03/24/25 0932   Wound Width (cm) 53.5 cm 03/24/25 0932   Wound Depth (cm) 0.1 cm 03/24/25 0932   Wound Surface Area (cm^2) 802.5 cm^2 03/24/25 0932   Change in Wound Size % (l*w) 36.31 03/24/25 0932   Wound Volume (cm^3) 80.25 cm^3 03/24/25 0932   Wound Healing % 36 03/24/25 0932   Post-Procedure Length (cm) 15 cm 03/24/25 0954   Post-Procedure Width (cm) 53.5 cm 03/24/25 0954   Post-Procedure Depth (cm) 0.2 cm 03/24/25 0954   Post-Procedure Surface Area (cm^2) 802.5 cm^2 03/24/25 0954   Post-Procedure Volume (cm^3) 160.5 cm^3 03/24/25 0954   Wound Assessment Slough;Pink/red;Other (Comment) 03/24/25 0932   Drainage Amount Small (< 25%) 03/24/25 0932   Drainage Description Serous 03/24/25 0932   Odor None 03/24/25 0932   Linda-wound Assessment Blanchable erythema;Maceration 03/24/25 0932   Margins Undefined edges 03/24/25 0932   Wound Thickness Description not for Pressure Injury

## 2025-03-26 ENCOUNTER — PHARMACY VISIT (OUTPATIENT)
Facility: CLINIC | Age: 80
End: 2025-03-26

## 2025-03-26 DIAGNOSIS — E11.9 TYPE 2 DIABETES MELLITUS WITHOUT COMPLICATION, WITHOUT LONG-TERM CURRENT USE OF INSULIN (HCC): Primary | ICD-10-CM

## 2025-03-31 ENCOUNTER — HOSPITAL ENCOUNTER (OUTPATIENT)
Facility: HOSPITAL | Age: 80
Discharge: HOME | End: 2025-03-31
Attending: PODIATRIST
Payer: MEDICARE

## 2025-03-31 VITALS
RESPIRATION RATE: 22 BRPM | TEMPERATURE: 97 F | SYSTOLIC BLOOD PRESSURE: 135 MMHG | DIASTOLIC BLOOD PRESSURE: 91 MMHG | HEART RATE: 77 BPM

## 2025-03-31 DIAGNOSIS — L97.922 NON-PRESSURE CHRONIC ULCER OF LOWER LEG WITH FAT LAYER EXPOSED, LEFT (HCC): Primary | ICD-10-CM

## 2025-03-31 PROCEDURE — 29581 APPL MULTLAYER CMPRN SYS LEG: CPT

## 2025-03-31 RX ORDER — LIDOCAINE HYDROCHLORIDE 40 MG/ML
SOLUTION TOPICAL PRN
OUTPATIENT
Start: 2025-03-31

## 2025-03-31 RX ORDER — NEOMYCIN/BACITRACIN/POLYMYXINB 3.5-400-5K
OINTMENT (GRAM) TOPICAL PRN
OUTPATIENT
Start: 2025-03-31

## 2025-03-31 RX ORDER — LIDOCAINE HYDROCHLORIDE 20 MG/ML
JELLY TOPICAL PRN
OUTPATIENT
Start: 2025-03-31

## 2025-03-31 RX ORDER — MUPIROCIN 20 MG/G
OINTMENT TOPICAL PRN
OUTPATIENT
Start: 2025-03-31

## 2025-03-31 RX ORDER — BACITRACIN ZINC AND POLYMYXIN B SULFATE 500; 1000 [USP'U]/G; [USP'U]/G
OINTMENT TOPICAL PRN
OUTPATIENT
Start: 2025-03-31

## 2025-03-31 RX ORDER — LIDOCAINE 50 MG/G
OINTMENT TOPICAL PRN
OUTPATIENT
Start: 2025-03-31

## 2025-03-31 RX ORDER — CLOBETASOL PROPIONATE 0.5 MG/G
OINTMENT TOPICAL PRN
OUTPATIENT
Start: 2025-03-31

## 2025-03-31 RX ORDER — BETAMETHASONE DIPROPIONATE 0.5 MG/G
CREAM TOPICAL PRN
OUTPATIENT
Start: 2025-03-31

## 2025-03-31 RX ORDER — TRIAMCINOLONE ACETONIDE 1 MG/G
OINTMENT TOPICAL PRN
OUTPATIENT
Start: 2025-03-31

## 2025-03-31 RX ORDER — GINSENG 100 MG
CAPSULE ORAL PRN
OUTPATIENT
Start: 2025-03-31

## 2025-03-31 RX ORDER — SILVER SULFADIAZINE 10 MG/G
CREAM TOPICAL PRN
OUTPATIENT
Start: 2025-03-31

## 2025-03-31 RX ORDER — SODIUM CHLOR/HYPOCHLOROUS ACID 0.033 %
SOLUTION, IRRIGATION IRRIGATION PRN
OUTPATIENT
Start: 2025-03-31

## 2025-03-31 RX ORDER — LIDOCAINE 40 MG/G
CREAM TOPICAL PRN
OUTPATIENT
Start: 2025-03-31

## 2025-03-31 RX ORDER — GENTAMICIN SULFATE 1 MG/G
OINTMENT TOPICAL PRN
OUTPATIENT
Start: 2025-03-31

## 2025-03-31 NOTE — FLOWSHEET NOTE
03/31/25 0923   Anesthetic   Anesthetic 4% Lidocaine Liquid Topical   Right Leg Edema Point of Measurement   Leg circumference 65 cm   Ankle circumference 38 cm   Left Leg Edema Point of Measurement   Leg circumference 60 cm   Ankle circumference 36 cm   RLE Neurovascular Assessment   Capillary Refill Less than/Equal to 3 seconds   Color Pink   Temperature Warm   R Pedal Pulse Doppler   LLE Neurovascular Assessment   Capillary Refill Less than/Equal to 3 seconds   Color Pink   Temperature Warm   L Pedal Pulse Doppler   Wound 03/17/25 Leg Left;Lower #1 circumferential   Date First Assessed/Time First Assessed: 03/17/25 0838   Present on Original Admission: Yes  Location: Leg  Wound Location Orientation: Left;Lower  Wound Description (Comments): #1 circumferential   Wound Image     Wound Etiology Venous   Dressing Status Old drainage noted   Wound Cleansed Soap and water   Post-Procedure Length (cm) 9 cm   Post-Procedure Width (cm) 43 cm   Post-Procedure Depth (cm) 0.1 cm   Post-Procedure Surface Area (cm^2) 387 cm^2   Post-Procedure Volume (cm^3) 38.7 cm^3   Wound Assessment Mills/red;Slough   Drainage Amount Moderate (25-50%)   Drainage Description Clear   Odor None   Linda-wound Assessment Blanchable erythema   Margins Undefined edges   Wound Thickness Description not for Pressure Injury Partial thickness   Wound 03/17/25 Leg Right;Lower #2 circumferential   Date First Assessed/Time First Assessed: 03/17/25 0838   Present on Original Admission: Yes  Location: Leg  Wound Location Orientation: Right;Lower  Wound Description (Comments): #2 circumferential   Wound Image     Wound Etiology Venous   Dressing Status Old drainage noted   Wound Cleansed Soap and water   Wound Length (cm) 11 cm   Wound Width (cm) 31 cm   Wound Depth (cm) 0.1 cm   Wound Surface Area (cm^2) 341 cm^2   Change in Wound Size % (l*w) 72.68   Wound Volume (cm^3) 34.1 cm^3   Wound Healing % 73   Wound Assessment Slough;Pink/red  (dried exudate) 
right lower leg.  Applied multilayered dressing below the knee to left lower leg.  Instructed patient/caregiver not to remove dressing and to keep it clean and dry.   Instructed patient/caregiver on complications to report to provider, such as pain, numbness in toes, heavy drainage, and slippage of dressing.  Instructed patient on purpose of compression dressing and on activity and exercise recommendations.      Electronically signed by Mary Arriola RN on 3/31/2025 at 10:15 AM

## 2025-03-31 NOTE — PATIENT INSTRUCTIONS
Discharge Instructions for  [facility]  [address] [place] [address]  Telephone: [phone]     FAX [phone]    Wound Care Center Information: Should you experience any significant changes in your wound(s) or have questions about your wound care, please contact the [facility] at MONDAY - FRIDAY 8:00 am - 4:30.  If you need help with your wound outside these hours and cannot wait until we are again available, contact your PCP or go to the hospital emergency room.     NAME:  Harriet Mcmillan  YOB: 1945  DATE:  3/31/2025    : Alison     [x]  [facility]    Wound Cleansing:   Do not scrub or use excessive force.  Cleanse wound prior to applying a clean dressing with:  [] Vashe - moisten gauze with Vashe, let sit for 2-3 minutes then pat dry    [x] Keep Wound Dry in Shower - may purchase a cast cover at local pharmacy     [x] Cleanse wound with Mild Soap & Water with home health changes   [] May Shower: coordinate with dressing changes, remove dressing 1st, wash with mild soap and water, pat dry, and redress wound right after with a new dressing  [] Do not shower  [] cleanse with baby shampoo lather leave 2-3 minutes then rinse with water    Topical Treatments:  Do not apply lotions, creams, or ointments to wound bed unless directed.   [x] Apply moisturizing lotion such as A&D ointment to skin surrounding the wound prior to dressing change.      Dressings:                   Wound Location Bilateral Lower Legs     Apply Primary Dressing:      [x] Xeroform     Cover and Secure with:  [x] Gauze [] ABD [x] Optilock    [] Drawtex  [] Alfonso [] Kerlix [] Mepilex Border  [] Ace Wrap [] Roll Tape   [x] Other: two layer calamine lite     Change dressing:   [] Daily      [] Every Other Day   [] Three times per week  [x] Once a week in wound care clinic  (Twice a week with home health)-patient to not come back for

## 2025-03-31 NOTE — WOUND CARE
[facility]   Wound Care and Hyperbaric Oxygen Therapy Center   Medical Staff Progress Note     Harriet Mcmillan  MEDICAL RECORD NUMBER:  [id number]  AGE: 79 y.o.   GENDER: female  : 1945  EPISODE DATE:  3/31/2025    Chief complaint and reason for visit:     Chief Complaint   Patient presents with    Wound Check     Right ad left lower leg wounds      Patient presents back for bilateral leg wounds and lymphedema.     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, left (HCC) - Primary    Relevant Medications    lidocaine 4 % jelly    Other Relevant Orders    Initiate Outpatient Wound Care Protocol    Initiate Oxygen Therapy Protocol       Wounds and Treatment Plan:        Wound 25 Leg Left;Lower #1 circumferential (Active)   Wound Image    25   Wound Etiology Venous 25   Dressing Status New dressing applied 25 1008   Wound Cleansed Soap and water 25   Dressing/Treatment Xeroform;Gauze dressing/dressing sponge;ABD 25 1001   Wound Length (cm) 15 cm 25   Wound Width (cm) 53.5 cm 25   Wound Depth (cm) 0.1 cm 25   Wound Surface Area (cm^2) 802.5 cm^2 25   Change in Wound Size % (l*w) 36.31 2532   Wound Volume (cm^3) 80.25 cm^3 25   Wound Healing % 36 2532   Post-Procedure Length (cm) 9 cm 25   Post-Procedure Width (cm) 43 cm 25   Post-Procedure Depth (cm) 0.1 cm 25   Post-Procedure Surface Area (cm^2) 387 cm^2 25   Post-Procedure Volume (cm^3) 38.7 cm^3 25   Wound Assessment Pink/red;Slough 25   Drainage Amount Moderate (25-50%) 25   Drainage Description Clear 25   Odor None 25   Linda-wound Assessment Blanchable erythema 25   Margins Undefined edges 25 0974

## 2025-04-12 DIAGNOSIS — E11.9 TYPE 2 DIABETES MELLITUS WITHOUT COMPLICATION, WITHOUT LONG-TERM CURRENT USE OF INSULIN: Primary | ICD-10-CM

## 2025-04-14 ENCOUNTER — HOSPITAL ENCOUNTER (OUTPATIENT)
Facility: HOSPITAL | Age: 80
Discharge: HOME | End: 2025-04-14
Attending: PODIATRIST
Payer: MEDICARE

## 2025-04-14 VITALS
HEART RATE: 62 BPM | SYSTOLIC BLOOD PRESSURE: 135 MMHG | RESPIRATION RATE: 20 BRPM | TEMPERATURE: 98.2 F | DIASTOLIC BLOOD PRESSURE: 63 MMHG

## 2025-04-14 DIAGNOSIS — L97.922 NON-PRESSURE CHRONIC ULCER OF LOWER LEG WITH FAT LAYER EXPOSED, LEFT (HCC): Primary | ICD-10-CM

## 2025-04-14 PROCEDURE — 29581 APPL MULTLAYER CMPRN SYS LEG: CPT

## 2025-04-14 RX ORDER — MUPIROCIN 20 MG/G
OINTMENT TOPICAL PRN
OUTPATIENT
Start: 2025-04-14

## 2025-04-14 RX ORDER — LIDOCAINE 40 MG/G
CREAM TOPICAL PRN
OUTPATIENT
Start: 2025-04-14

## 2025-04-14 RX ORDER — GINSENG 100 MG
CAPSULE ORAL PRN
OUTPATIENT
Start: 2025-04-14

## 2025-04-14 RX ORDER — TRIAMCINOLONE ACETONIDE 1 MG/G
OINTMENT TOPICAL PRN
OUTPATIENT
Start: 2025-04-14

## 2025-04-14 RX ORDER — LIDOCAINE 50 MG/G
OINTMENT TOPICAL PRN
OUTPATIENT
Start: 2025-04-14

## 2025-04-14 RX ORDER — LIDOCAINE HYDROCHLORIDE 20 MG/ML
JELLY TOPICAL PRN
OUTPATIENT
Start: 2025-04-14

## 2025-04-14 RX ORDER — BACITRACIN ZINC AND POLYMYXIN B SULFATE 500; 1000 [USP'U]/G; [USP'U]/G
OINTMENT TOPICAL PRN
OUTPATIENT
Start: 2025-04-14

## 2025-04-14 RX ORDER — SILVER SULFADIAZINE 10 MG/G
CREAM TOPICAL PRN
OUTPATIENT
Start: 2025-04-14

## 2025-04-14 RX ORDER — SODIUM CHLOR/HYPOCHLOROUS ACID 0.033 %
SOLUTION, IRRIGATION IRRIGATION PRN
OUTPATIENT
Start: 2025-04-14

## 2025-04-14 RX ORDER — BETAMETHASONE DIPROPIONATE 0.5 MG/G
CREAM TOPICAL PRN
OUTPATIENT
Start: 2025-04-14

## 2025-04-14 RX ORDER — NEOMYCIN/BACITRACIN/POLYMYXINB 3.5-400-5K
OINTMENT (GRAM) TOPICAL PRN
OUTPATIENT
Start: 2025-04-14

## 2025-04-14 RX ORDER — CLOBETASOL PROPIONATE 0.5 MG/G
OINTMENT TOPICAL PRN
OUTPATIENT
Start: 2025-04-14

## 2025-04-14 RX ORDER — METFORMIN HYDROCHLORIDE 500 MG/1
1000 TABLET, EXTENDED RELEASE ORAL DAILY
Qty: 180 TABLET | Refills: 1 | Status: SHIPPED | OUTPATIENT
Start: 2025-04-14

## 2025-04-14 RX ORDER — GENTAMICIN SULFATE 1 MG/G
OINTMENT TOPICAL PRN
OUTPATIENT
Start: 2025-04-14

## 2025-04-14 RX ORDER — LIDOCAINE HYDROCHLORIDE 40 MG/ML
SOLUTION TOPICAL PRN
OUTPATIENT
Start: 2025-04-14

## 2025-04-14 NOTE — FLOWSHEET NOTE
04/14/25 0916   Anesthetic   Anesthetic 4% Lidocaine Liquid Topical   Right Leg Edema Point of Measurement   Leg circumference 62.7 cm   Ankle circumference 37.5 cm   Left Leg Edema Point of Measurement   Leg circumference 59 cm   Ankle circumference 36 cm   RLE Neurovascular Assessment   Capillary Refill Less than/Equal to 3 seconds   Color Pink   Temperature Cool   R Pedal Pulse +1   LLE Neurovascular Assessment   Capillary Refill Less than/Equal to 3 seconds   Color Pink   Temperature Cool   L Pedal Pulse +1   Wound 03/17/25 Leg Left;Lower #1 circumferential   Date First Assessed/Time First Assessed: 03/17/25 0838   Present on Original Admission: Yes  Location: Leg  Wound Location Orientation: Left;Lower  Wound Description (Comments): #1 circumferential   Wound Image    Wound Etiology Venous   Dressing Status Clean;Dry;Intact   Wound Cleansed Soap and water   Wound Length (cm) 0.4 cm   Wound Width (cm) 0.8 cm   Wound Depth (cm) 0.1 cm   Wound Surface Area (cm^2) 0.32 cm^2   Change in Wound Size % (l*w) 99.97   Wound Volume (cm^3) 0.032 cm^3   Wound Healing % 100   Wound Assessment Pink/red   Drainage Amount None (dry)   Odor None   Linda-wound Assessment Blanchable erythema;Hyperpigmented   Margins Flat/open edges   Wound Thickness Description not for Pressure Injury Partial thickness   Wound 03/17/25 Leg Right;Lower #2 circumferential   Date First Assessed/Time First Assessed: 03/17/25 0838   Present on Original Admission: Yes  Location: Leg  Wound Location Orientation: Right;Lower  Wound Description (Comments): #2 circumferential   Wound Image    Wound Etiology Venous   Dressing Status Clean;Dry;Intact   Wound Cleansed Soap and water   Wound Length (cm) 5.2 cm   Wound Width (cm) 6.3 cm   Wound Depth (cm) 0.1 cm   Wound Surface Area (cm^2) 32.76 cm^2   Change in Wound Size % (l*w) 97.38   Wound Volume (cm^3) 3.276 cm^3   Wound Healing % 97   Wound Assessment Pink/red   Drainage Amount None (dry)   Odor None

## 2025-04-14 NOTE — PATIENT INSTRUCTIONS
Discharge Instructions for  [facility]  [address] [place] [address]  Telephone: [phone]     FAX [phone]    Wound Care Center Information: Should you experience any significant changes in your wound(s) or have questions about your wound care, please contact the [facility] at MONDAY - FRIDAY 8:00 am - 4:30.  If you need help with your wound outside these hours and cannot wait until we are again available, contact your PCP or go to the hospital emergency room.     NAME:  Harriet Mcmillan  YOB: 1945  DATE:  4/14/2025    : Alison     [x]  [facility]    Wound Cleansing:   Do not scrub or use excessive force.  Cleanse wound prior to applying a clean dressing with:  [] Vashe - moisten gauze with Vashe, let sit for 2-3 minutes then pat dry    [x] Keep Wound Dry in Shower - may purchase a cast cover at local pharmacy     [x] Cleanse wound with Mild Soap & Water with home health changes   [] May Shower: coordinate with dressing changes, remove dressing 1st, wash with mild soap and water, pat dry, and redress wound right after with a new dressing  [] Do not shower  [] cleanse with baby shampoo lather leave 2-3 minutes then rinse with water    Topical Treatments:  Do not apply lotions, creams, or ointments to wound bed unless directed.   [x] Apply moisturizing lotion such as A&D ointment to skin surrounding the wound prior to dressing change.      Dressings:                   Wound Location Bilateral Lower Legs     Apply Primary Dressing:      [x] Xeroform     Cover and Secure with:  [x] Gauze [] ABD [x] Optilock    [] Drawtex  [] Alfonso [] Kerlix [] Mepilex Border  [] Ace Wrap [] Roll Tape   [x] Other: two layer calamine lite     Change dressing:   [] Daily      [] Every Other Day   [] Three times per week  [x] Once a week in wound care clinic  (Twice a week with home health)-patient to not come back for

## 2025-04-14 NOTE — FLOWSHEET NOTE
04/14/25 0955   Right Leg Edema Point of Measurement   Compression Therapy 2 layer compression wrap  (Calamine Lite)   Left Leg Edema Point of Measurement   Compression Therapy 2 layer compression wrap  (Calamine Lite)   Wound 03/17/25 Leg Left;Lower #1 circumferential   Date First Assessed/Time First Assessed: 03/17/25 0838   Present on Original Admission: Yes  Location: Leg  Wound Location Orientation: Left;Lower  Wound Description (Comments): #1 circumferential   Dressing Status New dressing applied   Dressing/Treatment Xeroform;Gauze dressing/dressing sponge  (Optiloc; 2-layer wrap)   Offloading for Diabetic Foot Ulcers Offloading not required   Wound 03/17/25 Leg Right;Lower #2 circumferential   Date First Assessed/Time First Assessed: 03/17/25 0838   Present on Original Admission: Yes  Location: Leg  Wound Location Orientation: Right;Lower  Wound Description (Comments): #2 circumferential   Wound Etiology Venous   Dressing Status New dressing applied   Dressing/Treatment Xeroform;Gauze dressing/dressing sponge  (Optiloc; 2-layer wrap)   Pain Assessment   Pain Assessment None - Denies Pain     Discharge Condition: Stable    Pain: 0    Ambulatory Status:Rollator Walker    Discharge Destination: Home    Transportation:Car    Accompanied by: Self    Discharge instructions reviewed with Self and copy or written instructions have been provided. All questions/concerns have been addressed at this ttime.    Multilayer Compression Wrap   (Not Unna) Below the Knee    NAME:  Harriet Mcmillan  YOB: 1945  MEDICAL RECORD NUMBER:  [id number]  DATE:  4/14/2025    Multilayer compression wrap: Removed old Multilayer wrap if indicated and wash leg with mild soap/water.  Applied moisturizing agent to dry skin as needed.   Applied primary and secondary dressing as ordered.  Applied multilayered dressing below the knee to right lower leg.  Applied multilayered dressing below the knee to left lower leg.  Instructed

## 2025-04-14 NOTE — WOUND CARE
[facility]   Wound Care and Hyperbaric Oxygen Therapy Center   Medical Staff Progress Note     Harriet Mcmillan  MEDICAL RECORD NUMBER:  [id number]  AGE: 79 y.o.   GENDER: female  : 1945  EPISODE DATE:  2025    Chief complaint and reason for visit:     Chief Complaint   Patient presents with    Wound Check     Bilateral lower leg       Patient presents back for bilateral leg wounds and lymphedema.   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    Wound 25 Leg Left;Lower #1 circumferential (Active)   Wound Image   2516   Wound Etiology Venous 25 0916   Dressing Status New dressing applied 2555   Wound Cleansed Soap and water 25   Dressing/Treatment Xeroform;Gauze dressing/dressing sponge 25 0955   Offloading for Diabetic Foot Ulcers Offloading not required 25 0955   Wound Length (cm) 0.4 cm 2516   Wound Width (cm) 0.8 cm 25 0916   Wound Depth (cm) 0.1 cm 2516   Wound Surface Area (cm^2) 0.32 cm^2 25 0916   Change in Wound Size % (l*w) 99.97 25 0916   Wound Volume (cm^3) 0.032 cm^3 2516   Wound Healing % 100 25 0916   Post-Procedure Length (cm) 9 cm 2523   Post-Procedure Width (cm) 43 cm 2523   Post-Procedure Depth (cm) 0.1 cm 2523   Post-Procedure Surface Area (cm^2) 387 cm^2 2523   Post-Procedure Volume (cm^3) 38.7 cm^3 25 0923   Wound Assessment Pink/red 25 0916   Drainage Amount None (dry) 2516   Drainage Description Clear 25

## 2025-04-14 NOTE — TELEPHONE ENCOUNTER
Last visit 3/13/25 with NP Michelle  Follow up not scheduled   Lab Results   Component Value Date     11/25/2024    K 4.7 11/25/2024     11/25/2024    CO2 28 11/25/2024    BUN 17 11/25/2024    CREATININE 0.78 11/25/2024    GLUCOSE 134 (H) 11/25/2024    CALCIUM 9.3 11/25/2024    BILITOT 0.7 11/25/2024    ALKPHOS 83 11/25/2024    AST 36 11/25/2024    ALT 37 11/25/2024    LABGLOM 78 11/25/2024    GFRAA 100 01/14/2022    AGRATIO 1.6 01/14/2022    GLOB 3.8 11/25/2024     Lab Results   Component Value Date    WBC 7.5 11/25/2024    HGB 14.1 11/25/2024    HCT 44.1 11/25/2024    MCV 93.4 11/25/2024     11/25/2024

## 2025-04-16 ENCOUNTER — TELEPHONE (OUTPATIENT)
Facility: CLINIC | Age: 80
End: 2025-04-16

## 2025-04-16 NOTE — TELEPHONE ENCOUNTER
**[facility]**    Attempt made to contact patient at the home number.    Missed appointment (no show) with Lauren oYussef on 4/15/25 at 10:00 am    Left a message requesting a call back at [phone] to schedule the appointment    Letter Sent    Racquel Wong [facility]   Ambulatory Pharmacy Technician Clinical   [phone]  Department, toll free: [phone], option 2       For Pharmacy Admin Tracking Only    Program: Medical Group  Gap Closed?: No   Time Spent (min): 5

## 2025-04-22 ENCOUNTER — TELEPHONE (OUTPATIENT)
Facility: HOSPITAL | Age: 80
End: 2025-04-22

## 2025-04-22 NOTE — TELEPHONE ENCOUNTER
Jordyn from [facility], stating patient to see lymphedema tomorrow, but while  is to see patient, patient cannot go to lymphedema clinic, Jordyn stated wounds are healed and closed, patient to be discharged from home health so [facility] can [facility] and will see in wound clinic on Monday for final discharge.

## 2025-04-23 ENCOUNTER — HOSPITAL ENCOUNTER (OUTPATIENT)
Facility: HOSPITAL | Age: 80
Setting detail: RECURRING SERIES
Discharge: HOME | End: 2025-04-26
Payer: MEDICARE

## 2025-04-23 ENCOUNTER — PHARMACY VISIT (OUTPATIENT)
Facility: CLINIC | Age: 80
End: 2025-04-23

## 2025-04-23 DIAGNOSIS — E11.9 TYPE 2 DIABETES MELLITUS WITHOUT COMPLICATION, WITHOUT LONG-TERM CURRENT USE OF INSULIN (HCC): Primary | ICD-10-CM

## 2025-04-23 PROCEDURE — 97162 PT EVAL MOD COMPLEX 30 MIN: CPT

## 2025-04-23 PROCEDURE — 97535 SELF CARE MNGMENT TRAINING: CPT

## 2025-04-23 PROCEDURE — 97140 MANUAL THERAPY 1/> REGIONS: CPT

## 2025-04-23 NOTE — THERAPY EVALUATION
[facility]   a part of [facility]  [address], [address]   [place] [address]                                                    Phone:[phone]   Fax:[phone]                                                                                 PHYSICAL THERAPY LYMPHEDEMA - MEDICARE EVALUATION/PLAN OF CARE NOTE (updated 3/23)      Date: 2025          Patient Name:  Harriet Mcmillan :  1945   Medical   Diagnosis:  Lymphedema, not elsewhere classified [I89.0] Treatment Diagnosis:  I89.0     Lymphedema, not elsewhere classified    Referral Source:  Martha Whiting MD Provider #:  [id number]                Insurance: Payor: [employer] MEDICARE / Plan: [employer] MEDICARE-ADVANTAGE PPO / Product Type: Medicare /        Patient  verified yes     Visit #   Current  / Total 1 24   Time   In / Out 1030 1310   Total Treatment Time 100   Total Timed Codes 75   1:1 Treatment Time 75       BC Totals Reminder:  bill using total billable   min of TIMED therapeutic procedures and modalities.   8-22 min = 1 unit; 23-37 min = 2 units; 38-52 min = 3 units;  53-67 min = 4 units; 68-82 min = 5 units         SUBJECTIVE  Pain Level (0-10 scale): No report of pain   []constant []intermittent []improving []worsening []no change since onset    Any medication changes, allergies to medications, adverse drug reactions, diagnosis change, or new procedure performed?: [x] No    [] Yes (see summary sheet for update)  Medications: Verified on Patient Summary List    Subjective functional status/changes:     Patient reports progressive bilateral LE swelling over the last 10 years with worsening over the last few months. Seen by wound clinic to manage wounds and weeping. Reports significant weight gain over last 10 years that likely has contributed to worsening of swelling. Notes difficulty with long distance walking, requiring a rollator     Start of Care: 2025  Onset Date: ~10 years

## 2025-04-23 NOTE — PROGRESS NOTES
Creatinine Clearance: 79 mL/min (based on SCr of 0.78 mg/dL).      Medication reconciliation was completed during the visit. The following portions of the patient's chart were reviewed in this encounter and updated as appropriate:  Reviewed and updated this visit:  Allergies  Meds         No orders of the defined types were placed in this encounter.       Patient verbalized understanding of the information presented and all of the patient’s questions were answered.  AVS was handed to the patient. Patient advised to call the office with any additional questions or concerns.    Thank you for the consult,  Lauren Youssef, PharmD, BCPS, CDCES      For Pharmacy Admin Tracking Only    Program: Medical Group  CPA in place:  Yes  Recommendation Provided To: Patient/Caregiver: 2 via In person  Intervention Detail: Dose Adjustment: 1, reason: Therapy Optimization and Scheduled Appointment  Intervention Accepted By: Patient/Caregiver: 2  Gap Closed?: No   Time Spent (min): 30

## 2025-04-23 NOTE — PROGRESS NOTES
Patient here for brief 1 week follow up to give her injection in the office. She was able to give injection with no issues.    Lauren Youssef, PharmD, BCPS, CDCES    For Pharmacy Admin Tracking Only    Program: Medical Group  CPA in place:  Yes  Recommendation Provided To: Patient/Caregiver: 1 via In person  Intervention Detail: Device Training  Intervention Accepted By: Patient/Caregiver: 1  Gap Closed?: No   Time Spent (min): 20

## 2025-04-28 ENCOUNTER — HOSPITAL ENCOUNTER (OUTPATIENT)
Facility: HOSPITAL | Age: 80
Discharge: HOME | End: 2025-04-28
Attending: PODIATRIST
Payer: MEDICARE

## 2025-04-28 VITALS
SYSTOLIC BLOOD PRESSURE: 142 MMHG | HEART RATE: 73 BPM | DIASTOLIC BLOOD PRESSURE: 60 MMHG | TEMPERATURE: 98 F | RESPIRATION RATE: 18 BRPM

## 2025-04-28 PROCEDURE — 29581 APPL MULTLAYER CMPRN SYS LEG: CPT

## 2025-04-28 PROCEDURE — 99213 OFFICE O/P EST LOW 20 MIN: CPT

## 2025-04-28 RX ORDER — LIDOCAINE 50 MG/G
OINTMENT TOPICAL PRN
OUTPATIENT
Start: 2025-04-28

## 2025-04-28 RX ORDER — LIDOCAINE HYDROCHLORIDE 20 MG/ML
JELLY TOPICAL PRN
OUTPATIENT
Start: 2025-04-28

## 2025-04-28 RX ORDER — MUPIROCIN 20 MG/G
OINTMENT TOPICAL PRN
OUTPATIENT
Start: 2025-04-28

## 2025-04-28 RX ORDER — BETAMETHASONE DIPROPIONATE 0.5 MG/G
CREAM TOPICAL PRN
OUTPATIENT
Start: 2025-04-28

## 2025-04-28 RX ORDER — GINSENG 100 MG
CAPSULE ORAL PRN
OUTPATIENT
Start: 2025-04-28

## 2025-04-28 RX ORDER — NEOMYCIN/BACITRACIN/POLYMYXINB 3.5-400-5K
OINTMENT (GRAM) TOPICAL PRN
OUTPATIENT
Start: 2025-04-28

## 2025-04-28 RX ORDER — CLOBETASOL PROPIONATE 0.5 MG/G
OINTMENT TOPICAL PRN
OUTPATIENT
Start: 2025-04-28

## 2025-04-28 RX ORDER — LIDOCAINE 40 MG/G
CREAM TOPICAL PRN
OUTPATIENT
Start: 2025-04-28

## 2025-04-28 RX ORDER — SILVER SULFADIAZINE 10 MG/G
CREAM TOPICAL PRN
OUTPATIENT
Start: 2025-04-28

## 2025-04-28 RX ORDER — TRIAMCINOLONE ACETONIDE 1 MG/G
OINTMENT TOPICAL PRN
OUTPATIENT
Start: 2025-04-28

## 2025-04-28 RX ORDER — BACITRACIN ZINC AND POLYMYXIN B SULFATE 500; 1000 [USP'U]/G; [USP'U]/G
OINTMENT TOPICAL PRN
OUTPATIENT
Start: 2025-04-28

## 2025-04-28 RX ORDER — LIDOCAINE HYDROCHLORIDE 40 MG/ML
SOLUTION TOPICAL PRN
OUTPATIENT
Start: 2025-04-28

## 2025-04-28 RX ORDER — SODIUM CHLOR/HYPOCHLOROUS ACID 0.033 %
SOLUTION, IRRIGATION IRRIGATION PRN
OUTPATIENT
Start: 2025-04-28

## 2025-04-28 RX ORDER — GENTAMICIN SULFATE 1 MG/G
OINTMENT TOPICAL PRN
OUTPATIENT
Start: 2025-04-28

## 2025-04-28 ASSESSMENT — PAIN DESCRIPTION - DESCRIPTORS: DESCRIPTORS: DISCOMFORT

## 2025-04-28 ASSESSMENT — PAIN SCALES - GENERAL: PAINLEVEL_OUTOF10: 2

## 2025-04-28 ASSESSMENT — PAIN DESCRIPTION - LOCATION: LOCATION: LEG

## 2025-04-28 ASSESSMENT — PAIN DESCRIPTION - ORIENTATION: ORIENTATION: RIGHT;LEFT

## 2025-04-28 ASSESSMENT — PAIN DESCRIPTION - FREQUENCY: FREQUENCY: CONTINUOUS

## 2025-04-28 ASSESSMENT — PAIN DESCRIPTION - PAIN TYPE: TYPE: CHRONIC PAIN

## 2025-04-28 NOTE — WOUND CARE
History   Problem Relation Age of Onset    Cancer Father         Prostate    Alzheimer's Disease Mother     Hypertension Mother     Hypertension Father        SOCIAL HISTORY    Social History     Tobacco Use    Smoking status: Never     Passive exposure: Never    Smokeless tobacco: Never   Vaping Use    Vaping status: Never Used   Substance Use Topics    Alcohol use: Yes    Drug use: Never       ALLERGIES    Allergies   Allergen Reactions    Ciprofloxacin Swelling    Penicillins Swelling     Unsure of reaction    Sulfa Antibiotics      Other reaction(s): Unknown (comments)  She doesn't remember       MEDICATIONS    Current Outpatient Medications on File Prior to Encounter   Medication Sig Dispense Refill    Semaglutide,0.25 or 0.5MG/DOS, 2 MG/3ML SOPN Inject 0.25 mg into the skin every 7 days for 4 weeks then increase to 0.5mg once weekly 9 mL 0    atorvastatin (LIPITOR) 20 MG tablet TAKE 1 TABLET BY MOUTH EVERY DAY 90 tablet 1    FLUoxetine (PROZAC) 20 MG capsule TAKE 1 CAPSULE BY MOUTH EVERY DAY 90 capsule 1    olmesartan (BENICAR) 20 MG tablet TAKE 1 TABLET BY MOUTH EVERY DAY 90 tablet 1    albuterol (PROVENTIL) (2.5 MG/3ML) 0.083% nebulizer solution Inhale into the lungs      budesonide-formoterol (SYMBICORT) 160-4.5 MCG/ACT AERO Inhale 2 puffs into the lungs 2 times daily       No current facility-administered medications on file prior to encounter.         Written patient dismissal instructions given to patient and signed by patient or POA.         Electronically signed by Ibeth Raza DPM on 4/28/2025 at 12:13 PM

## 2025-04-28 NOTE — FLOWSHEET NOTE
04/28/25 0931   Anesthetic   Anesthetic 4% Lidocaine Liquid Topical   Right Leg Edema Point of Measurement   Leg circumference 63 cm   Ankle circumference 42 cm   Compression Therapy 2 layer compression wrap   Left Leg Edema Point of Measurement   Leg circumference 59 cm   Ankle circumference 37 cm   Compression Therapy 2 layer compression wrap   RLE Neurovascular Assessment   Capillary Refill Less than/Equal to 3 seconds   Color Pink   Temperature Cool   R Pedal Pulse +1   LLE Neurovascular Assessment   Capillary Refill Less than/Equal to 3 seconds   Color Pink   Temperature Cool   L Pedal Pulse +1     BP (!) 142/60   Pulse 73   Temp 98 °F (36.7 °C) (Temporal)   Resp 18

## 2025-04-28 NOTE — PATIENT INSTRUCTIONS
Discharge Instructions for  [facility]  [address] [place] [address]  Telephone: [phone]     FAX [phone]    Wound Care Center Information: Should you experience any significant changes in your wound(s) or have questions about your wound care, please contact the [facility] at MONDAY - FRIDAY 8:00 am - 4:30.  If you need help with your wound outside these hours and cannot wait until we are again available, contact your PCP or go to the hospital emergency room.     NAME:  Harriet Mcmillan  YOB: 1945  DATE:  4/28/2025    : NENA Goodrich     [x]  [facility] please continue with OT therapy     Wound Cleansing:   Do not scrub or use excessive force.  Cleanse wound prior to applying a clean dressing with:  [] Vashe - moisten gauze with Vashe, let sit for 2-3 minutes then pat dry    [x] Keep Wound Dry in Shower - may purchase a cast cover at local pharmacy     [] Cleanse wound with Mild Soap & Water with home health changes   [] May Shower: coordinate with dressing changes, remove dressing 1st, wash with mild soap and water, pat dry, and redress wound right after with a new dressing  [] Do not shower  [] cleanse with baby shampoo lather leave 2-3 minutes then rinse with water    Topical Treatments:  Do not apply lotions, creams, or ointments to wound bed unless directed.   [x] Apply moisturizing lotion such as A&D ointment to skin surrounding the wound prior to dressing change.      Dressings:                   Wound Location Bilateral Lower Legs     Apply Primary Dressing:      [x] Iodine to left medial ankle and cover with mepiltel foam     Cover and Secure with:  [x] Gauze [] ABD [] Optilock    [] Drawtex  [] Alfonso [] Kerlix [] Mepilex Border  [] Ace Wrap [] Roll Tape   [x] Other: Three layer compression wrap      Change dressing:   [] Daily      [] Every Other Day   [] Three times per week  [] Once a

## 2025-04-28 NOTE — FLOWSHEET NOTE
04/28/25 1012   Right Leg Edema Point of Measurement   Compression Therapy 3 layer compression wrap   Left Leg Edema Point of Measurement   Compression Therapy 3 layer compression wrap  (betadine to left medial ankle, foam)     Discharge Condition: Stable    Pain: 0    Ambulatory Status: Rolling Walker    Discharge Destination: home    Transportation:car    Accompanied by: SELF    Discharge instructions reviewed with SELF and copy or written instructions have been provided. All questions/concerns have been addressed at this time.     Multilayer Compression Wrap   (Not Unna) Below the Knee    NAME:  Harriet Mcmillan  YOB: 1945  MEDICAL RECORD NUMBER:  [id number]  DATE:  4/28/2025    Multilayer compression wrap: Removed old Multilayer wrap if indicated and wash leg with mild soap/water.  Applied moisturizing agent to dry skin as needed.   Applied primary and secondary dressing as ordered.  Applied multilayered dressing below the knee to right lower leg.  Applied multilayered dressing below the knee to left lower leg.  Instructed patient/caregiver not to remove dressing and to keep it clean and dry.   Instructed patient/caregiver on complications to report to provider, such as pain, numbness in toes, heavy drainage, and slippage of dressing.  Instructed patient on purpose of compression dressing and on activity and exercise recommendations.      Electronically signed by EMILY PEREZ RN on 4/28/2025 at 10:21 AM

## 2025-05-01 ENCOUNTER — HOSPITAL ENCOUNTER (OUTPATIENT)
Facility: HOSPITAL | Age: 80
Setting detail: RECURRING SERIES
Discharge: HOME | End: 2025-05-04
Payer: MEDICARE

## 2025-05-01 PROCEDURE — 97110 THERAPEUTIC EXERCISES: CPT

## 2025-05-01 PROCEDURE — 97140 MANUAL THERAPY 1/> REGIONS: CPT

## 2025-05-01 NOTE — PROGRESS NOTES
PHYSICAL THERAPY/OCCUPATIONAL THERAPY - MEDICARE DAILY TREATMENT NOTE (updated 3/23)      Date: 2025          Patient Name:  Harriet Mcmillan :  1945   Medical   Diagnosis:  Lymphedema, not elsewhere classified [I89.0] Treatment Diagnosis:  I89.0     Lymphedema, not elsewhere classified    Referral Source:  Martha Whiting MD Insurance:   Payor: [facility] MEDICARE / Plan: AET MEDICARE-ADVANTAGE PPO / Product Type: Medicare /                     Patient  verified yes     Visit #   Current  / Total 2 24   Time   In / Out 1130 1258   Total Treatment Time 88   Total Timed Codes 88   1:1 Treatment Time 88      [facility] Totals Reminder:  bill using total billable   min of TIMED therapeutic procedures and modalities.   8-22 min = 1 unit; 23-37 min = 2 units; 38-52 min = 3 units;  53-67 min = 4 units; 68-82 min = 5 units         SUBJECTIVE  If an interpreting service was utilized for treatment of this patient, the contents of this document represent the material reviewed with the patient via the .     Pain Level (0-10 scale): 0    Any medication changes, allergies to medications, adverse drug reactions, diagnosis change, or new procedure performed?: [x] No    [] Yes (see summary sheet for update)  Medications: Verified on Patient Summary List    Subjective functional status/changes:     Pt arrives with profore wraps intact placed by woundcare center on Monday, bandages visibly looser. Patient did not bring short stretch bandages today, but requested wrapping with 2 layer calamine-coban wraps     OBJECTIVE      Therapeutic Procedures:  Tx Min Billable or 1:1 Min (if diff from Tx Min) Procedure, Rationale, Specifics   78  [id number] Manual Therapy (timed):  increase tissue extensibility and decrease edema to improve patient's ability to progress to PLOF and address remaining functional goals.  The manual therapy interventions were performed at a separate and distinct time from the therapeutic activities

## 2025-05-05 ENCOUNTER — HOSPITAL ENCOUNTER (OUTPATIENT)
Facility: HOSPITAL | Age: 80
Discharge: HOME | End: 2025-05-05
Attending: PODIATRIST
Payer: MEDICARE

## 2025-05-05 VITALS
RESPIRATION RATE: 18 BRPM | DIASTOLIC BLOOD PRESSURE: 57 MMHG | SYSTOLIC BLOOD PRESSURE: 116 MMHG | HEART RATE: 63 BPM | TEMPERATURE: 97.5 F

## 2025-05-05 DIAGNOSIS — L97.922 NON-PRESSURE CHRONIC ULCER OF LOWER LEG WITH FAT LAYER EXPOSED, LEFT (HCC): Primary | ICD-10-CM

## 2025-05-05 PROCEDURE — 99213 OFFICE O/P EST LOW 20 MIN: CPT | Performed by: SURGERY

## 2025-05-05 PROCEDURE — 99212 OFFICE O/P EST SF 10 MIN: CPT

## 2025-05-05 RX ORDER — LIDOCAINE 50 MG/G
OINTMENT TOPICAL PRN
Status: CANCELLED | OUTPATIENT
Start: 2025-05-05

## 2025-05-05 RX ORDER — MUPIROCIN 20 MG/G
OINTMENT TOPICAL PRN
Status: CANCELLED | OUTPATIENT
Start: 2025-05-05

## 2025-05-05 RX ORDER — BACITRACIN ZINC AND POLYMYXIN B SULFATE 500; 1000 [USP'U]/G; [USP'U]/G
OINTMENT TOPICAL PRN
Status: CANCELLED | OUTPATIENT
Start: 2025-05-05

## 2025-05-05 RX ORDER — LIDOCAINE HYDROCHLORIDE 20 MG/ML
JELLY TOPICAL PRN
Status: CANCELLED | OUTPATIENT
Start: 2025-05-05

## 2025-05-05 RX ORDER — GINSENG 100 MG
CAPSULE ORAL PRN
Status: CANCELLED | OUTPATIENT
Start: 2025-05-05

## 2025-05-05 RX ORDER — LIDOCAINE HYDROCHLORIDE 40 MG/ML
SOLUTION TOPICAL PRN
Status: CANCELLED | OUTPATIENT
Start: 2025-05-05

## 2025-05-05 RX ORDER — LIDOCAINE 40 MG/G
CREAM TOPICAL PRN
Status: CANCELLED | OUTPATIENT
Start: 2025-05-05

## 2025-05-05 RX ORDER — SILVER SULFADIAZINE 10 MG/G
CREAM TOPICAL PRN
Status: CANCELLED | OUTPATIENT
Start: 2025-05-05

## 2025-05-05 RX ORDER — BETAMETHASONE DIPROPIONATE 0.5 MG/G
CREAM TOPICAL PRN
Status: CANCELLED | OUTPATIENT
Start: 2025-05-05

## 2025-05-05 RX ORDER — TRIAMCINOLONE ACETONIDE 1 MG/G
OINTMENT TOPICAL PRN
Status: CANCELLED | OUTPATIENT
Start: 2025-05-05

## 2025-05-05 RX ORDER — NEOMYCIN/BACITRACIN/POLYMYXINB 3.5-400-5K
OINTMENT (GRAM) TOPICAL PRN
Status: CANCELLED | OUTPATIENT
Start: 2025-05-05

## 2025-05-05 RX ORDER — GENTAMICIN SULFATE 1 MG/G
OINTMENT TOPICAL PRN
Status: CANCELLED | OUTPATIENT
Start: 2025-05-05

## 2025-05-05 RX ORDER — CLOBETASOL PROPIONATE 0.5 MG/G
OINTMENT TOPICAL PRN
Status: CANCELLED | OUTPATIENT
Start: 2025-05-05

## 2025-05-05 RX ORDER — SODIUM CHLOR/HYPOCHLOROUS ACID 0.033 %
SOLUTION, IRRIGATION IRRIGATION PRN
Status: CANCELLED | OUTPATIENT
Start: 2025-05-05

## 2025-05-05 NOTE — PATIENT INSTRUCTIONS
[facility] THANK YOU      Your treatment has been completed at [facility] outpatient wound clinic on 5/5/2025  , per Dr. Rios.     We know patients have several options when choosing a health care provider. We would like to express our sincere appreciation for having had the chance to be yours. More importantly, we enjoy having you as part of our family.     We also hope your experience with us has surpassed your expectations and that you have been pleased with our service. We appreciate the confidence you've shown in selecting us as your health care provider and we will continue or commitment to provide the highest quality of service.      Again, thank you for choosing us for your health care needs. If you have any further questions or concerns, please call [phone]. It has been our pleasure serving you and we hope to continue our relationship in the future, if the need occurs.       Sincerely,    [facility] Wound Care Center Team     Discuss with Lymphedema compression for home use long term.     In clinic today use double tubi  bilateral lower legs.

## 2025-05-05 NOTE — PROGRESS NOTES
WOUND CARE PROGRESS       Subjective:     Patient is a 79-year-old female with morbid obesity BMI of 60+, who is seen in wound care by Dr. Raza for chronic bilateral lower extremity lymphedema and chronic bilateral calf open wounds.  She has lymphedema clinic appointment apparently May 6, 2025      04/28/25 1012    Right Leg Edema Point of Measurement   Compression Therapy 3 layer compression wrap   Left Leg Edema Point of Measurement   Compression Therapy 3 layer compression wrap  (betadine to left medial ankle, foam)      Follow-up May 5, 2025 patient updated as her lymphedema appointment is now May 6, 2025 wound essentially off of healed although she still has chronic lymphedema      Review of Systems   Constitutional:         See HPI   All other systems reviewed and are negative.      Objective:     There were no vitals taken for this visit.    No data recorded.      Physical Exam  Vitals and nursing note reviewed. Exam conducted with a chaperone present.   Constitutional:       General: She is not in acute distress.     Appearance: Normal appearance. She is obese. She is not ill-appearing.   HENT:      Head: Normocephalic and atraumatic.      Mouth/Throat:      Pharynx: Oropharynx is clear.   Eyes:      Conjunctiva/sclera: Conjunctivae normal.   Cardiovascular:      Rate and Rhythm: Normal rate.   Pulmonary:      Effort: Pulmonary effort is normal.   Abdominal:      Comments: Obese but soft and flat   Skin:     General: Skin is warm.   Neurological:      General: No focal deficit present.      Mental Status: She is alert and oriented to person, place, and time.   Psychiatric:         Mood and Affect: Mood normal.         Behavior: Behavior normal.         Thought Content: Thought content normal.         Judgment: Judgment normal.       Bilateral calfs with significant chronic lymphedema but no open wounds, palpable pulses faint through the foot edema but no evidence of ischemia  05/05/25 0854    Right Leg

## 2025-05-05 NOTE — FLOWSHEET NOTE
05/05/25 0854   Right Leg Edema Point of Measurement   Leg circumference 60.8 cm   Ankle circumference 40 cm   Left Leg Edema Point of Measurement   Leg circumference 58.8 cm   Ankle circumference 37 cm   RLE Neurovascular Assessment   Capillary Refill Less than/Equal to 3 seconds   Color Appropriate for Ethnicity   Temperature Cool   R Pedal Pulse +1   LLE Neurovascular Assessment   Capillary Refill Less than/Equal to 3 seconds   Color Appropriate for Ethnicity   Temperature Cool   L Pedal Pulse +1   Wound 03/17/25 Leg Left;Lower #1 circumferential   Date First Assessed/Time First Assessed: 03/17/25 0838   Present on Original Admission: Yes  Location: Leg  Wound Location Orientation: Left;Lower  Wound Description (Comments): #1 circumferential   Wound Image     Wound Cleansed Soap and water   Wound Length (cm) 0 cm   Wound Width (cm) 0 cm   Wound Depth (cm) 0 cm   Wound Surface Area (cm^2) 0 cm^2   Change in Wound Size % (l*w) 100   Wound Volume (cm^3) 0 cm^3   Wound Healing % 100   Wound Assessment Pink/red   Drainage Amount None (dry)   Odor None   Linda-wound Assessment Blanchable erythema   Margins Attached edges   Wound 03/17/25 Leg Right;Lower #2 circumferential   Date First Assessed/Time First Assessed: 03/17/25 0838   Present on Original Admission: Yes  Location: Leg  Wound Location Orientation: Right;Lower  Wound Description (Comments): #2 circumferential   Wound Image     Wound Cleansed Soap and water   Wound Length (cm) 0 cm   Wound Width (cm) 0 cm   Wound Depth (cm) 0 cm   Wound Surface Area (cm^2) 0 cm^2   Change in Wound Size % (l*w) 100   Wound Volume (cm^3) 0 cm^3   Wound Healing % 100   Wound Assessment Pink/red   Drainage Amount None (dry)   Odor None   Linda-wound Assessment Blanchable erythema   Margins Attached edges   Pain Assessment   Pain Assessment None - Denies Pain     BP (!) 116/57   Pulse 63   Temp 97.5 °F (36.4 °C) (Temporal)   Resp 18

## 2025-05-05 NOTE — FLOWSHEET NOTE
05/05/25 0915   Right Leg Edema Point of Measurement   Compression Therapy Tubular elastic support bandage   Tubular Elastic Support Bandage Compression Pressure Medium   Left Leg Edema Point of Measurement   Compression Therapy Tubular elastic support bandage   Tubular Elastic Support Bandage Compression Pressure Medium     Discharge Condition: Stable    Pain: 0    Ambulatory Status: Rolling Walker    Discharge Destination: home    Transportation:car    Accompanied by: SELF    Discharge instructions reviewed with SELF and copy or written instructions have been provided. All questions/concerns have been addressed at this time.

## 2025-05-06 ENCOUNTER — HOSPITAL ENCOUNTER (OUTPATIENT)
Facility: HOSPITAL | Age: 80
Setting detail: RECURRING SERIES
Discharge: HOME | End: 2025-05-09
Payer: MEDICARE

## 2025-05-06 PROCEDURE — 97140 MANUAL THERAPY 1/> REGIONS: CPT

## 2025-05-06 NOTE — PROGRESS NOTES
PHYSICAL THERAPY/OCCUPATIONAL THERAPY - MEDICARE DAILY TREATMENT NOTE (updated 3/23)      Date: 2025          Patient Name:  Harriet Mcmillan :  1945   Medical   Diagnosis:  Lymphedema, not elsewhere classified [I89.0] Treatment Diagnosis:  I89.0     Lymphedema, not elsewhere classified    Referral Source:  Martha Whiting MD Insurance:   Payor: [facility] MEDICARE / Plan: AET MEDICARE-ADVANTAGE PPO / Product Type: Medicare /                     Patient  verified yes     Visit #   Current  / Total 3 24   Time   In / Out 1354 1508   Total Treatment Time 74   Total Timed Codes 74   1:1 Treatment Time 74      [facility] Totals Reminder:  bill using total billable   min of TIMED therapeutic procedures and modalities.   8-22 min = 1 unit; 23-37 min = 2 units; 38-52 min = 3 units;  53-67 min = 4 units; 68-82 min = 5 units         SUBJECTIVE  If an interpreting service was utilized for treatment of this patient, the contents of this document represent the material reviewed with the patient via the .     Pain Level (0-10 scale): 0    Any medication changes, allergies to medications, adverse drug reactions, diagnosis change, or new procedure performed?: [x] No    [] Yes (see summary sheet for update)  Medications: Verified on Patient Summary List    Subjective functional status/changes:     Pt arrives with tubigrip placed by woundcare center on Monday, Pt brought box of badnages with her today, agreeable to treatment     OBJECTIVE      Therapeutic Procedures:  Tx Min Billable or 1:1 Min (if diff from Tx Min) Procedure, Rationale, Specifics   74  [id number] Manual Therapy (timed):  increase tissue extensibility and decrease edema to improve patient's ability to progress to PLOF and address remaining functional goals.  The manual therapy interventions were performed at a separate and distinct time from the therapeutic activities interventions . (see flow sheet as applicable)    Details if applicable:    Manual

## 2025-05-09 ENCOUNTER — HOSPITAL ENCOUNTER (OUTPATIENT)
Facility: HOSPITAL | Age: 80
Setting detail: RECURRING SERIES
Discharge: HOME | End: 2025-05-12
Payer: MEDICARE

## 2025-05-09 PROCEDURE — 97140 MANUAL THERAPY 1/> REGIONS: CPT

## 2025-05-09 PROCEDURE — 97166 OT EVAL MOD COMPLEX 45 MIN: CPT

## 2025-05-09 NOTE — THERAPY EVALUATION
[facility]   a part of [facility]  [address], [address]   [place] [address]                                                    Phone:[phone]   Fax:[phone]                                                                                 PHYSICAL THERAPY LYMPHEDEMA - MEDICARE EVALUATION/PLAN OF CARE NOTE (updated 3/23)      Date: 2025          Patient Name:  Harriet Mcmillan :  1945   Medical   Diagnosis:  Lymphedema, not elsewhere classified [I89.0] Treatment Diagnosis:  I89.0     Lymphedema, not elsewhere classified    Referral Source:  Martha Whiting MD Provider #:  [id number]                Insurance: Payor: [employer] MEDICARE / Plan: [employer] MEDICARE-ADVANTAGE PPO / Product Type: Medicare /        Patient  verified yes     Visit #   Current  / Total 1 24   Time   In / Out 1345 1500   Total Treatment Time 75   Total Timed Codes 60   1:1 Treatment Time 60      [facility] Totals Reminder:  bill using total billable   min of TIMED therapeutic procedures and modalities.   8-22 min = 1 unit; 23-37 min = 2 units; 38-52 min = 3 units;  53-67 min = 4 units; 68-82 min = 5 units         SUBJECTIVE  Pain Level (0-10 scale): No report of pain   []constant []intermittent []improving []worsening []no change since onset    Any medication changes, allergies to medications, adverse drug reactions, diagnosis change, or new procedure performed?: [x] No    [] Yes (see summary sheet for update)  Medications: Verified on Patient Summary List    Subjective functional status/changes:     From initial PT evaluation: Patient reports progressive bilateral LE swelling over the last 10 years with worsening over the last few months. Seen by wound clinic to manage wounds and weeping. Reports significant weight gain over last 10 years that likely has contributed to worsening of swelling. Notes difficulty with long distance walking, requiring a rollator     Pt is re-evaluated

## 2025-05-11 DIAGNOSIS — F34.1 DYSTHYMIC DISORDER: ICD-10-CM

## 2025-05-11 DIAGNOSIS — E78.5 HYPERLIPIDEMIA, UNSPECIFIED: ICD-10-CM

## 2025-05-11 DIAGNOSIS — I10 ESSENTIAL (PRIMARY) HYPERTENSION: ICD-10-CM

## 2025-05-11 RX ORDER — ATORVASTATIN CALCIUM 20 MG/1
20 TABLET, FILM COATED ORAL DAILY
Qty: 90 TABLET | Refills: 1 | Status: SHIPPED | OUTPATIENT
Start: 2025-05-11

## 2025-05-11 RX ORDER — OLMESARTAN MEDOXOMIL 20 MG/1
20 TABLET ORAL DAILY
Qty: 90 TABLET | Refills: 1 | Status: SHIPPED | OUTPATIENT
Start: 2025-05-11

## 2025-05-12 ENCOUNTER — APPOINTMENT (OUTPATIENT)
Facility: HOSPITAL | Age: 80
End: 2025-05-12
Payer: MEDICARE

## 2025-05-12 ENCOUNTER — HOSPITAL ENCOUNTER (OUTPATIENT)
Facility: HOSPITAL | Age: 80
Setting detail: RECURRING SERIES
Discharge: HOME | End: 2025-05-15
Payer: MEDICARE

## 2025-05-12 PROCEDURE — 97140 MANUAL THERAPY 1/> REGIONS: CPT

## 2025-05-12 NOTE — PROGRESS NOTES
PHYSICAL THERAPY/OCCUPATIONAL THERAPY - MEDICARE DAILY TREATMENT NOTE (updated 3/23)      Date: 2025          Patient Name:  Harriet Mcmillan :  1945   Medical   Diagnosis:  Lymphedema, not elsewhere classified [I89.0] Treatment Diagnosis:  I89.0     Lymphedema, not elsewhere classified    Referral Source:  Martha Whiting MD Insurance:   Payor: [facility] MEDICARE / Plan: AET MEDICARE-ADVANTAGE PPO / Product Type: Medicare /                     Patient  verified yes     Visit #   Current  / Total 4 24   Time   In / Out 1415 1526   Total Treatment Time 71   Total Timed Codes 71   1:1 Treatment Time 71      [facility] Totals Reminder:  bill using total billable   min of TIMED therapeutic procedures and modalities.   8-22 min = 1 unit; 23-37 min = 2 units; 38-52 min = 3 units;  53-67 min = 4 units; 68-82 min = 5 units         SUBJECTIVE  If an interpreting service was utilized for treatment of this patient, the contents of this document represent the material reviewed with the patient via the .     Pain Level (0-10 scale): 0    Any medication changes, allergies to medications, adverse drug reactions, diagnosis change, or new procedure performed?: [x] No    [] Yes (see summary sheet for update)  Medications: Verified on Patient Summary List    Subjective functional status/changes:     Pt arrives with bandages intact, notes that they stayed on better this time.     OBJECTIVE      Therapeutic Procedures:  Tx Min Billable or 1:1 Min (if diff from Tx Min) Procedure, Rationale, Specifics   71  [id number] Manual Therapy (timed):  increase tissue extensibility and decrease edema to improve patient's ability to progress to PLOF and address remaining functional goals.  The manual therapy interventions were performed at a separate and distinct time from the therapeutic activities interventions . (see flow sheet as applicable)    Details if applicable:    Manual Lymphatic Drainage (MLD):  Area to decongest: LE

## 2025-05-14 ENCOUNTER — APPOINTMENT (OUTPATIENT)
Facility: HOSPITAL | Age: 80
End: 2025-05-14
Payer: MEDICARE

## 2025-05-15 ENCOUNTER — HOSPITAL ENCOUNTER (OUTPATIENT)
Facility: HOSPITAL | Age: 80
Setting detail: RECURRING SERIES
Discharge: HOME | End: 2025-05-18
Payer: MEDICARE

## 2025-05-15 PROCEDURE — 97140 MANUAL THERAPY 1/> REGIONS: CPT

## 2025-05-15 NOTE — PROGRESS NOTES
PHYSICAL THERAPY/OCCUPATIONAL THERAPY - MEDICARE DAILY TREATMENT NOTE (updated 3/23)      Date: 5/15/2025          Patient Name:  Harriet Mcmillan :  1945   Medical   Diagnosis:  Lymphedema, not elsewhere classified [I89.0] Treatment Diagnosis:  I89.0     Lymphedema, not elsewhere classified    Referral Source:  Martha Whiting MD Insurance:   Payor: [facility] MEDICARE / Plan: AET MEDICARE-ADVANTAGE PPO / Product Type: Medicare /                     Patient  verified yes     Visit #   Current  / Total 5 24   Time   In / Out 1427 1542   Total Treatment Time 75   Total Timed Codes 75   1:1 Treatment Time 75      [facility] Totals Reminder:  bill using total billable   min of TIMED therapeutic procedures and modalities.   8-22 min = 1 unit; 23-37 min = 2 units; 38-52 min = 3 units;  53-67 min = 4 units; 68-82 min = 5 units         SUBJECTIVE  If an interpreting service was utilized for treatment of this patient, the contents of this document represent the material reviewed with the patient via the .     Pain Level (0-10 scale): 0    Any medication changes, allergies to medications, adverse drug reactions, diagnosis change, or new procedure performed?: [x] No    [] Yes (see summary sheet for update)  Medications: Verified on Patient Summary List    Subjective functional status/changes:     Pt arrives with bandages intact, notes that bandages slid conserably this time and caused discomfort     OBJECTIVE      Therapeutic Procedures:  Tx Min Billable or 1:1 Min (if diff from Tx Min) Procedure, Rationale, Specifics   [id number] Manual Therapy (timed):  increase tissue extensibility and decrease edema to improve patient's ability to progress to PLOF and address remaining functional goals.  The manual therapy interventions were performed at a separate and distinct time from the therapeutic activities interventions . (see flow sheet as applicable)    Details if applicable:    Manual Lymphatic Drainage

## 2025-05-19 ENCOUNTER — HOSPITAL ENCOUNTER (OUTPATIENT)
Facility: HOSPITAL | Age: 80
Setting detail: RECURRING SERIES
Discharge: HOME | End: 2025-05-22
Payer: MEDICARE

## 2025-05-19 PROCEDURE — 97140 MANUAL THERAPY 1/> REGIONS: CPT

## 2025-05-19 NOTE — PROGRESS NOTES
PHYSICAL THERAPY/OCCUPATIONAL THERAPY - MEDICARE DAILY TREATMENT NOTE (updated 3/23)      Date: 2025          Patient Name:  Harriet Mcmillan :  1945   Medical   Diagnosis:  Lymphedema, not elsewhere classified [I89.0] Treatment Diagnosis:  I89.0     Lymphedema, not elsewhere classified    Referral Source:  Martha Whiting MD Insurance:   Payor: [facility] MEDICARE / Plan: AET MEDICARE-ADVANTAGE PPO / Product Type: Medicare /                     Patient  verified yes     Visit #   Current  / Total 6 24   Time   In / Out 1411 1524   Total Treatment Time 73   Total Timed Codes 73   1:1 Treatment Time 73      [facility] Totals Reminder:  bill using total billable   min of TIMED therapeutic procedures and modalities.   8-22 min = 1 unit; 23-37 min = 2 units; 38-52 min = 3 units;  53-67 min = 4 units; 68-82 min = 5 units         SUBJECTIVE  If an interpreting service was utilized for treatment of this patient, the contents of this document represent the material reviewed with the patient via the .     Pain Level (0-10 scale): 0    Any medication changes, allergies to medications, adverse drug reactions, diagnosis change, or new procedure performed?: [x] No    [] Yes (see summary sheet for update)  Medications: Verified on Patient Summary List    Subjective functional status/changes:     Pt arrives with bandages intact, notes that bandages slid much less this time    OBJECTIVE      Therapeutic Procedures:  Tx Min Billable or 1:1 Min (if diff from Tx Min) Procedure, Rationale, Specifics   73  [id number] Manual Therapy (timed):  increase tissue extensibility and decrease edema to improve patient's ability to progress to PLOF and address remaining functional goals.  The manual therapy interventions were performed at a separate and distinct time from the therapeutic activities interventions . (see flow sheet as applicable)    Details if applicable:    Manual Lymphatic Drainage (MLD):  Area to decongest: LE

## 2025-05-21 ENCOUNTER — APPOINTMENT (OUTPATIENT)
Facility: HOSPITAL | Age: 80
End: 2025-05-21
Payer: MEDICARE

## 2025-05-22 ENCOUNTER — HOSPITAL ENCOUNTER (OUTPATIENT)
Facility: HOSPITAL | Age: 80
Setting detail: RECURRING SERIES
Discharge: HOME | End: 2025-05-25
Payer: MEDICARE

## 2025-05-22 PROCEDURE — 97140 MANUAL THERAPY 1/> REGIONS: CPT

## 2025-05-22 NOTE — PROGRESS NOTES
PHYSICAL THERAPY/OCCUPATIONAL THERAPY - MEDICARE DAILY TREATMENT NOTE (updated 3/23)      Date: 2025          Patient Name:  Harriet Mcmillan :  1945   Medical   Diagnosis:  Lymphedema, not elsewhere classified [I89.0] Treatment Diagnosis:  I89.0     Lymphedema, not elsewhere classified    Referral Source:  Martha Whiting MD Insurance:   Payor: [facility] MEDICARE / Plan: AET MEDICARE-ADVANTAGE PPO / Product Type: Medicare /                     Patient  verified yes     Visit #   Current  / Total 7 24   Time   In / Out 1142 1302   Total Treatment Time 80   Total Timed Codes 80   1:1 Treatment Time 80      [facility] Totals Reminder:  bill using total billable   min of TIMED therapeutic procedures and modalities.   8-22 min = 1 unit; 23-37 min = 2 units; 38-52 min = 3 units;  53-67 min = 4 units; 68-82 min = 5 units         SUBJECTIVE  If an interpreting service was utilized for treatment of this patient, the contents of this document represent the material reviewed with the patient via the .     Pain Level (0-10 scale): 0    Any medication changes, allergies to medications, adverse drug reactions, diagnosis change, or new procedure performed?: [x] No    [] Yes (see summary sheet for update)  Medications: Verified on Patient Summary List    Subjective functional status/changes:     Pt arrives with bandages intact, feels that tubigrip helps them from sliding.     OBJECTIVE      Therapeutic Procedures:  Tx Min Billable or 1:1 Min (if diff from Tx Min) Procedure, Rationale, Specifics   80  [id number] Manual Therapy (timed):  increase tissue extensibility and decrease edema to improve patient's ability to progress to PLOF and address remaining functional goals.  The manual therapy interventions were performed at a separate and distinct time from the therapeutic activities interventions . (see flow sheet as applicable)    Details if applicable:    Manual Lymphatic Drainage (MLD):  Area to decongest: LE

## 2025-05-22 NOTE — PROGRESS NOTES
[facility]   a part of [facility]  [address], [address]   [place] [address]   Phone: [phone]  Fax: [phone]      PHYSICAL THERAPY PROGRESS NOTE  Patient Name:  Harriet Mcmillan :  1945   Treatment/Medical Diagnosis: Lymphedema, not elsewhere classified [I89.0]   Referral Source:  Martha Whiting MD     Date of Initial Visit:  25 Attended Visits:  7 Missed Visits:  0     SUMMARY OF TREATMENT/ASSESSMENT:  Mrs. Harriet Mcmillan is a 78yo female with a history of morbid obesity, HTN, ROBERTH, impaired mobility and ADL's who presents with stage III Lymphedema of bilateral lower legs. She has recent wound history, lymphorrhea, redness, fibrotic tissue changes. It has impacted her ability to complete car transfers, bed mobility, walk long distances, she requires assist with showering and struggles with donning footwear. We have initiated phase I of CDT with patient receiving MLD twice per week, MLB, HEP, and skincare regimen. Wounds have closed, lymphorrhea has subsided, and patient has seen impressive volume and circumference reduction since initiating treatment. 656mL reduction on left leg and 1163 on right leg. Right calf down nearly 8 cm, left calf down over 8cm. Patient continues to have fibrotic changes in ankle and medial thigh, redness in distal lower lower legs, dry flaky skin, loss of anatomical limb shape.     CURRENT STATUS/GOALS  Short Term Goals: To be accomplished in 12 treatments.  Patient will demonstrate decreased volumetric measurement of right lower extremity to <20958 ml, in order to reduce risk for infection, decrease feeling of limb heaviness, and increase independence/tolerance for car transfers, bed mobility, walking activity completion 6 weeks.    Patient will demonstrate decreased volumetric measurements of left lower extremity to <23763 ml, in order to reduce risk for infection, decrease feeling of limb heaviness, and increase

## 2025-05-23 ENCOUNTER — APPOINTMENT (OUTPATIENT)
Facility: HOSPITAL | Age: 80
End: 2025-05-23
Payer: MEDICARE

## 2025-05-27 ENCOUNTER — HOSPITAL ENCOUNTER (OUTPATIENT)
Facility: HOSPITAL | Age: 80
Setting detail: RECURRING SERIES
Discharge: HOME | End: 2025-05-30
Payer: MEDICARE

## 2025-05-27 ENCOUNTER — PHARMACY VISIT (OUTPATIENT)
Facility: CLINIC | Age: 80
End: 2025-05-27

## 2025-05-27 VITALS
HEART RATE: 72 BPM | BODY MASS INDEX: 55.84 KG/M2 | WEIGHT: 277 LBS | DIASTOLIC BLOOD PRESSURE: 72 MMHG | HEIGHT: 59 IN | OXYGEN SATURATION: 94 % | SYSTOLIC BLOOD PRESSURE: 118 MMHG

## 2025-05-27 DIAGNOSIS — E11.9 TYPE 2 DIABETES MELLITUS WITHOUT COMPLICATION, WITHOUT LONG-TERM CURRENT USE OF INSULIN (HCC): Primary | ICD-10-CM

## 2025-05-27 PROCEDURE — 97140 MANUAL THERAPY 1/> REGIONS: CPT

## 2025-05-27 NOTE — PROGRESS NOTES
PHYSICAL THERAPY/OCCUPATIONAL THERAPY - MEDICARE DAILY TREATMENT NOTE (updated 3/23)      Date: 2025          Patient Name:  Harriet Mcmillan :  1945   Medical   Diagnosis:  Lymphedema, not elsewhere classified [I89.0] Treatment Diagnosis:  I89.0     Lymphedema, not elsewhere classified    Referral Source:  Martha Whiting MD Insurance:   Payor: [facility] MEDICARE / Plan: AET MEDICARE-ADVANTAGE PPO / Product Type: Medicare /                     Patient  verified yes     Visit #   Current  / Total 8 24   Time   In / Out 1404 1600   Total Treatment Time 116   Total Timed Codes 41   1:1 Treatment Time 41      [facility] Totals Reminder:  bill using total billable   min of TIMED therapeutic procedures and modalities.   8-22 min = 1 unit; 23-37 min = 2 units; 38-52 min = 3 units;  53-67 min = 4 units; 68-82 min = 5 units         SUBJECTIVE  If an interpreting service was utilized for treatment of this patient, the contents of this document represent the material reviewed with the patient via the .     Pain Level (0-10 scale): 0    Any medication changes, allergies to medications, adverse drug reactions, diagnosis change, or new procedure performed?: [x] No    [] Yes (see summary sheet for update)  Medications: Verified on Patient Summary List    Subjective functional status/changes:     Pt arrives with bandages intact, notes that she has continued to have success with ozempic, noting nearly 30 pound weight loss. Endorses greater ease with transfers, still feels that gait speed is quite slow.      OBJECTIVE      Therapeutic Procedures:  Tx Min Billable or 1:1 Min (if diff from Tx Min) Procedure, Rationale, Specifics   41  [id number] Manual Therapy (timed):  increase tissue extensibility and decrease edema to improve patient's ability to progress to PLOF and address remaining functional goals.  The manual therapy interventions were performed at a separate and distinct time from the therapeutic activities

## 2025-05-27 NOTE — PROGRESS NOTES
Pharmacy Progress Note - Diabetes Management    Assessment / Plan:   Diabetes Management:  - Per ADA guidelines, Pt's A1c is at goal of < 7%.   - Current SMBG(s)/CGM trend not evaluated as patient is not checking her blood sugars  - weight is coming off nicely likely due to lymphedema treatment along with   - continue current dose of Ozempic and follow up to see if additional titrations are warranted in future    Follow up: 6/24/2025       S/O: Harriet Mcmillan is a 79 y.o. female referred by Martha Roberson MD for diabetes management. Feels better with the Ozempic vs the Metformin. Did have 1 issue with diarrhea after [employer]. Overall feels like her weight has plateaud.   May be down ~25lbs     Current diabetes regimen include(s):    - Ozempic 0.5mg once weekly     ROS:  Today, Pt endorses:  - Symptoms of Hyperglycemia: none  - Symptoms of Hypoglycemia: none    Blood Glucose Monitoring (BGM) or CGM:  Not checking    Nutrition:  Eating smaller portions    Physical Activity:   Still doing wound care/lymphedema clinic  Wants to eventually get back in the pool     Last eye exam:  hasn't been recently    The 10-year ASCVD risk score (Marie DK, et al., 2019) is: 41.9%    Values used to calculate the score:      Age: 79 years      Sex: Female      Is Non- : No      Diabetic: Yes      Tobacco smoker: No      Systolic Blood Pressure: 116 mmHg      Is BP treated: Yes      HDL Cholesterol: 67 MG/DL      Total Cholesterol: 186 MG/DL     Vitals:  Wt Readings from Last 3 Encounters:   03/13/25 (!) 137 kg (302 lb)   03/06/25 (!) 137 kg (302 lb)   11/25/24 131.8 kg (290 lb 9.6 oz)     BP Readings from Last 3 Encounters:   05/05/25 (!) 116/57   04/28/25 (!) 142/60   04/14/25 135/63     Pulse Readings from Last 3 Encounters:   05/05/25 63   04/28/25 73   04/14/25 62       Lab Results   Component Value Date/Time    LDL 95.2 11/25/2024 12:17 PM    .8 11/28/2023 03:59 PM     Hemoglobin A1C   Date

## 2025-05-28 ENCOUNTER — APPOINTMENT (OUTPATIENT)
Facility: HOSPITAL | Age: 80
End: 2025-05-28
Payer: MEDICARE

## 2025-05-29 ENCOUNTER — HOSPITAL ENCOUNTER (OUTPATIENT)
Facility: HOSPITAL | Age: 80
Setting detail: RECURRING SERIES
End: 2025-05-29
Payer: MEDICARE

## 2025-05-29 PROCEDURE — 97140 MANUAL THERAPY 1/> REGIONS: CPT

## 2025-05-29 NOTE — PROGRESS NOTES
PHYSICAL THERAPY/OCCUPATIONAL THERAPY - MEDICARE DAILY TREATMENT NOTE (updated 3/23)      Date: 2025          Patient Name:  Harriet Mcmillan :  1945   Medical   Diagnosis:  Lymphedema, not elsewhere classified [I89.0] Treatment Diagnosis:  I89.0     Lymphedema, not elsewhere classified    Referral Source:  Martha Whiting MD Insurance:   Payor: [facility] MEDICARE / Plan: AET MEDICARE-ADVANTAGE PPO / Product Type: Medicare /                     Patient  verified yes     Visit #   Current  / Total 9 24   Time   In / Out 1353 1505   Total Treatment Time 72   Total Timed Codes 72   1:1 Treatment Time 72      [facility] Totals Reminder:  bill using total billable   min of TIMED therapeutic procedures and modalities.   8-22 min = 1 unit; 23-37 min = 2 units; 38-52 min = 3 units;  53-67 min = 4 units; 68-82 min = 5 units         SUBJECTIVE  If an interpreting service was utilized for treatment of this patient, the contents of this document represent the material reviewed with the patient via the .     Pain Level (0-10 scale): 0    Any medication changes, allergies to medications, adverse drug reactions, diagnosis change, or new procedure performed?: [x] No    [] Yes (see summary sheet for update)  Medications: Verified on Patient Summary List    Subjective functional status/changes:     Pt arrives with bandages intact. Endorses greater ease with transfers and bed mobility, feels like pants are looser on legs.   OBJECTIVE      Therapeutic Procedures:  Tx Min Billable or 1:1 Min (if diff from Tx Min) Procedure, Rationale, Specifics   72  18955 Manual Therapy (timed):  increase tissue extensibility and decrease edema to improve patient's ability to progress to PLOF and address remaining functional goals.  The manual therapy interventions were performed at a separate and distinct time from the therapeutic activities interventions . (see flow sheet as applicable)    Details if applicable:    Manual  Left    4/23 Left  5/22   MTP: 22.5 20.6 22.5 20.6   Midfoot/navicular: 25.6 25.3 24 23.5   Ankle: 38.1 36.4 39.7 35.4   Calf     (25 cm from floor) 60 52.5 58 53.4   Calf     (33 cm from floor) 61.5 53.8 59 53.1   Knee   (patella) 68.5 69.7 65.5 67   Thigh  (53 cm from floor) 76 70 69.5 67.5       Right Leg: Eval 4/23 5/22/25:    Left leg:     Pain Level at end of session (0-10 scale): 0      Assessment   Mrs. Mcmillan is tolerating MLD and MLB, compliant with keeping bandaging on, skin remains intact, still red, areas of hyperkeratosis and dry/flaky skin. Last session, she completed demo for the Basic pump, Tactile Nimbl, with increased circumferential measurements at calf, thigh, and waist. She also completed demo of the Tactile Flexitouch, with circumferential reduction at each measurement point. She has a diagnosis of Stage III Bilateral LE lymphedema with truncal swelling, history of orthopedic and abdominal surgery. Symptoms have continued to persist after >4 weeks of compression, exercise, and elevation. She is positive for skin changes: hyperkeratosis, hyperpigmentation, history of lymphorrea, wounds, cellulitis. She failed the basic pump demo. Therefore, she would be an excellent candidate for the Flexitouch to help with symptoms management.     Patient will continue to benefit from skilled PT / OT services to address functional mobility deficits, address swelling, analyze compression product fit and use, instruct in home lymphedema management program, measure for compression products, and modify and progress therapeutic interventions to address functional deficits and attain remaining goals.    Progress toward goals / Updated goals:  []  See Progress Note/Recertification    Short Term Goals: To be accomplished in 12 treatments.  Patient will demonstrate decreased volumetric measurement of right lower extremity to <58009 ml, in order to reduce risk for infection, decrease feeling of limb heaviness, and

## 2025-05-30 ENCOUNTER — APPOINTMENT (OUTPATIENT)
Facility: HOSPITAL | Age: 80
End: 2025-05-30
Payer: MEDICARE

## 2025-06-04 ENCOUNTER — HOSPITAL ENCOUNTER (OUTPATIENT)
Facility: HOSPITAL | Age: 80
Setting detail: RECURRING SERIES
Discharge: HOME | End: 2025-06-07
Payer: MEDICARE

## 2025-06-04 PROCEDURE — 97140 MANUAL THERAPY 1/> REGIONS: CPT

## 2025-06-04 NOTE — PROGRESS NOTES
PHYSICAL THERAPY/OCCUPATIONAL THERAPY - MEDICARE DAILY TREATMENT NOTE (updated 3/23)      Date: 2025          Patient Name:  Harriet Mcmillan :  1945   Medical   Diagnosis:  Lymphedema, not elsewhere classified [I89.0] Treatment Diagnosis:  I89.0     Lymphedema, not elsewhere classified    Referral Source:  Martha Whiting MD Insurance:   Payor: [facility] MEDICARE / Plan: AET MEDICARE-ADVANTAGE PPO / Product Type: Medicare /                     Patient  verified yes     Visit #   Current  / Total 2 24   Time   In / Out 1010 1125   Total Treatment Time 75   Total Timed Codes 75   1:1 Treatment Time 75      [facility] Totals Reminder:  bill using total billable   min of TIMED therapeutic procedures and modalities.   8-22 min = 1 unit; 23-37 min = 2 units; 38-52 min = 3 units;  53-67 min = 4 units; 68-82 min = 5 units         SUBJECTIVE  If an interpreting service was utilized for treatment of this patient, the contents of this document represent the material reviewed with the patient via the .     Pain Level (0-10 scale): No pain reported     Any medication changes, allergies to medications, adverse drug reactions, diagnosis change, or new procedure performed?: [x] No    [] Yes (see summary sheet for update)  Medications: Verified on Patient Summary List    Subjective functional status/changes:     Information from OT evaluation (2025):  Patient reports progressive bilateral LE swelling over the last 10 years with worsening over the last few months. Seen by wound clinic to manage wounds and weeping. Reports significant weight gain over last 10 years that likely has contributed to worsening of swelling. Notes difficulty with long distance walking, requiring a rollator      Pt is re-evaluated today by OT for continuity of lymphedema care. She has responded well to complete decongestive therapy including 2x week MLD and bandaging, but continues to have swelling.  Her wounds have closed and she

## 2025-06-06 ENCOUNTER — HOSPITAL ENCOUNTER (OUTPATIENT)
Facility: HOSPITAL | Age: 80
Setting detail: RECURRING SERIES
Discharge: HOME | End: 2025-06-09
Payer: MEDICARE

## 2025-06-06 PROCEDURE — 97140 MANUAL THERAPY 1/> REGIONS: CPT

## 2025-06-06 NOTE — PROGRESS NOTES
PHYSICAL THERAPY/OCCUPATIONAL THERAPY - MEDICARE DAILY TREATMENT NOTE (updated 3/23)      Date: 2025          Patient Name:  Harriet Mcmillan :  1945   Medical   Diagnosis:  Lymphedema, not elsewhere classified [I89.0] Treatment Diagnosis:  I89.0     Lymphedema, not elsewhere classified    Referral Source:  Martha Whiting MD Insurance:   Payor: [facility] MEDICARE / Plan: AET MEDICARE-ADVANTAGE PPO / Product Type: Medicare /                     Patient  verified yes     Visit #   Current  / Total 10 24   Time   In / Out 0834 0958   Total Treatment Time 84   Total Timed Codes 84   1:1 Treatment Time 84      [facility] Totals Reminder:  bill using total billable   min of TIMED therapeutic procedures and modalities.   8-22 min = 1 unit; 23-37 min = 2 units; 38-52 min = 3 units;  53-67 min = 4 units; 68-82 min = 5 units         SUBJECTIVE  If an interpreting service was utilized for treatment of this patient, the contents of this document represent the material reviewed with the patient via the .     Pain Level (0-10 scale): 0    Any medication changes, allergies to medications, adverse drug reactions, diagnosis change, or new procedure performed?: [x] No    [] Yes (see summary sheet for update)  Medications: Verified on Patient Summary List    Subjective functional status/changes:     Pt arrives with bandages intact. Notes some sliding on R LE but much better than last session (seen by OT) where she had increased redness and irritation at ankle.   OBJECTIVE      Therapeutic Procedures:  Tx Min Billable or 1:1 Min (if diff from Tx Min) Procedure, Rationale, Specifics   84  52685 Manual Therapy (timed):  increase tissue extensibility and decrease edema to improve patient's ability to progress to PLOF and address remaining functional goals.  The manual therapy interventions were performed at a separate and distinct time from the therapeutic activities interventions . (see flow sheet as

## 2025-06-08 DIAGNOSIS — E11.9 TYPE 2 DIABETES MELLITUS WITHOUT COMPLICATION, WITHOUT LONG-TERM CURRENT USE OF INSULIN (HCC): ICD-10-CM

## 2025-06-09 RX ORDER — SEMAGLUTIDE 0.68 MG/ML
INJECTION, SOLUTION SUBCUTANEOUS
Qty: 4 ML | Refills: 2 | Status: SHIPPED | OUTPATIENT
Start: 2025-06-09 | End: 2025-06-10 | Stop reason: SDUPTHER

## 2025-06-10 DIAGNOSIS — E11.9 TYPE 2 DIABETES MELLITUS WITHOUT COMPLICATION, WITHOUT LONG-TERM CURRENT USE OF INSULIN (HCC): ICD-10-CM

## 2025-06-10 RX ORDER — SEMAGLUTIDE 0.68 MG/ML
0.25 INJECTION, SOLUTION SUBCUTANEOUS WEEKLY
Qty: 6 ML | Refills: 2 | Status: ACTIVE | OUTPATIENT
Start: 2025-06-10

## 2025-06-11 ENCOUNTER — HOSPITAL ENCOUNTER (OUTPATIENT)
Facility: HOSPITAL | Age: 80
Setting detail: RECURRING SERIES
Discharge: HOME | End: 2025-06-14
Payer: MEDICARE

## 2025-06-11 PROCEDURE — 97140 MANUAL THERAPY 1/> REGIONS: CPT

## 2025-06-11 NOTE — PROGRESS NOTES
Drainage (MLD):  Area to decongest: LE Bilateral, foot, ankle, calf, mid knee, buttock, and lower abdomen   Sequence used and effectiveness: Modifications were made to manual lymph drainage sequence to exclude cervical techniques secondary to patient's age. and Secondary sequence for lower extremities with trunk involvement.  Began and ended with 5x diaphragmatic breaths, superficial abdominal sequence,   Fibrosis techniques medial thighs and medial malleoli  6/11: Focus of MLD on RLE due to more congestion.      Skin/wound care/debridement: Reviewed skin care principles:   Patient's extremity bathed with soap and water., Performed skin care with low pH lotion following manual lymph principles., Skin care products., Hygiene., Prevention of cellulitis., and Wound care  Skin intact, covered lower legs with mixture of aquaphor and ammonium lactate   Applied multi-layer compression bandaging to: Patient arrived with adequate bandage in place that was applied by caregiver.  appears to be 30-40 mm Hg    The following multi-layer bandages were applied:  Protouch Stockinette    Padding layer:  Cellona    Foam:  Deferred foam roll as this seems to worsen bandage sliding   added 1/2\" gray foam to bilateral medial malleoli.   Coban over cast padding proximal lower leg and above ankle  Added vertical strips of dycem between layers to help with sliding     Short stretch bandages:   6 cm, 8 cm, and 10 cm (10cm x2 on RLE)     Added tubigrip to upper portion of bandages to prevent slipping       NP  78500 Therapeutic Exercise (timed):  increase ROM, strength, coordination, balance, and proprioception to improve patient's ability to progress to PLOF and address remaining functional goals. (see flow sheet as applicable)    Details if applicable:    Reviewed HEP: Ankle circles, ankle pumps, LAQ, marching, hamstring curl 10 reps 2-3 times per day. Reviewed diaphragmatic breathing and self MLD as part of HEP          Details if

## 2025-06-13 ENCOUNTER — HOSPITAL ENCOUNTER (OUTPATIENT)
Facility: HOSPITAL | Age: 80
Setting detail: RECURRING SERIES
Discharge: HOME | End: 2025-06-16
Payer: MEDICARE

## 2025-06-13 PROCEDURE — 97140 MANUAL THERAPY 1/> REGIONS: CPT

## 2025-06-13 PROCEDURE — 97760 ORTHOTIC MGMT&TRAING 1ST ENC: CPT

## 2025-06-13 NOTE — PROGRESS NOTES
PHYSICAL THERAPY/OCCUPATIONAL THERAPY - MEDICARE DAILY TREATMENT NOTE (updated 3/23)      Date: 2025          Patient Name:  Harriet Mcmillan :  1945   Medical   Diagnosis:  Lymphedema, not elsewhere classified [I89.0] Treatment Diagnosis:  I89.0     Lymphedema, not elsewhere classified    Referral Source:  Martha Whiting MD Insurance:   Payor: [facility] MEDICARE / Plan: AET MEDICARE-ADVANTAGE PPO / Product Type: Medicare /                     Patient  verified yes     Visit #   Current  / Total 12 24   Time   In / Out 0825 0950   Total Treatment Time 85   Total Timed Codes 65   1:1 Treatment Time 65      [facility] Totals Reminder:  bill using total billable   min of TIMED therapeutic procedures and modalities.   8-22 min = 1 unit; 23-37 min = 2 units; 38-52 min = 3 units;  53-67 min = 4 units; 68-82 min = 5 units         SUBJECTIVE  If an interpreting service was utilized for treatment of this patient, the contents of this document represent the material reviewed with the patient via the .     Pain Level (0-10 scale): 0    Any medication changes, allergies to medications, adverse drug reactions, diagnosis change, or new procedure performed?: [x] No    [] Yes (see summary sheet for update)  Medications: Verified on Patient Summary List    Subjective functional status/changes:     Pt arrives with bandages intact. Notes R side did not slide down this time. Agreeable to treatment and measuring for garments today  OBJECTIVE      Therapeutic Procedures:  Tx Min Billable or 1:1 Min (if diff from Tx Min) Procedure, Rationale, Specifics   40 00 [id number] Manual Therapy (timed):  increase tissue extensibility and decrease edema to improve patient's ability to progress to PLOF and address remaining functional goals.  The manual therapy interventions were performed at a separate and distinct time from the therapeutic activities interventions . (see flow sheet as applicable)    Details if applicable:

## 2025-06-18 ENCOUNTER — TELEPHONE (OUTPATIENT)
Age: 80
End: 2025-06-18

## 2025-06-18 ENCOUNTER — HOSPITAL ENCOUNTER (OUTPATIENT)
Facility: HOSPITAL | Age: 80
Setting detail: RECURRING SERIES
Discharge: HOME | End: 2025-06-21
Payer: MEDICARE

## 2025-06-18 DIAGNOSIS — E11.9 TYPE 2 DIABETES MELLITUS WITHOUT COMPLICATION, WITHOUT LONG-TERM CURRENT USE OF INSULIN (HCC): Primary | ICD-10-CM

## 2025-06-18 DIAGNOSIS — E11.9 TYPE 2 DIABETES MELLITUS WITHOUT COMPLICATION, WITHOUT LONG-TERM CURRENT USE OF INSULIN (HCC): ICD-10-CM

## 2025-06-18 PROCEDURE — 97140 MANUAL THERAPY 1/> REGIONS: CPT

## 2025-06-18 NOTE — TELEPHONE ENCOUNTER
Preventive Health Recommendations  Female Ages 50 - 64    Yearly exam: See your health care provider every year in order to  o Review health changes.   o Discuss preventive care.    o Review your medicines if your doctor has prescribed any.      Get a Pap test every three years (unless you have an abnormal result and your provider advises testing more often).    If you get Pap tests with HPV test, you only need to test every 5 years, unless you have an abnormal result.     You do not need a Pap test if your uterus was removed (hysterectomy) and you have not had cancer.    You should be tested each year for STDs (sexually transmitted diseases) if you're at risk.     Have a mammogram every 1 to 2 years.    Have a colonoscopy at age 50, or have a yearly FIT test (stool test). These exams screen for colon cancer.      Have a cholesterol test every 5 years, or more often if advised.    Have a diabetes test (fasting glucose) every three years. If you are at risk for diabetes, you should have this test more often.     If you are at risk for osteoporosis (brittle bone disease), think about having a bone density scan (DEXA).    Shots: Get a flu shot each year. Get a tetanus shot every 10 years.    Nutrition:     Eat at least 5 servings of fruits and vegetables each day.    Eat whole-grain bread, whole-wheat pasta and brown rice instead of white grains and rice.    Get adequate Calcium and Vitamin D.     Lifestyle    Exercise at least 150 minutes a week (30 minutes a day, 5 days a week). This will help you control your weight and prevent disease.    Limit alcohol to one drink per day.    No smoking.     Wear sunscreen to prevent skin cancer.     See your dentist every six months for an exam and cleaning.    See your eye doctor every 1 to 2 years.     Patient called in and requesting a return call from the pharmacist.  Patient has questions about her appointment and her prescriptions at [facility].    Patient contact number: [phone]     Julita Guillaume [facility]   Population Health Clinical   [facility] Clinical Pharmacy  Department, toll free: [phone], option 2

## 2025-06-18 NOTE — TELEPHONE ENCOUNTER
Verified appointment for next Tuesday. Patient thinks the 0.5mg Ozempic dose is no longer helping quite as much as it was at first. Patient would like to increase to 1mg Ozempic. Will send in order now to pharmacy. Advised patient to expect call from Katja later today. Rescheduled appt since making a change - will follow up July 9th to see how new dose is doing. Patient also inquiring about lymphedema clinic needing a signature from PCP. Sent message to Rufina Soto to see what they need us to sign.    Lauren Youssef, PharmD, BCPS, [facility]      For Pharmacy Admin Tracking Only    Program: Medical Group  CPA in place:  Yes  Recommendation Provided To: Patient/Caregiver: 2 via Telephone  Intervention Detail: Dose Adjustment: 1, reason: Therapy Optimization and Scheduled Appointment  Intervention Accepted By: Patient/Caregiver: 2  Gap Closed?: No   Time Spent (min): 15

## 2025-06-18 NOTE — PROGRESS NOTES
PHYSICAL THERAPY/OCCUPATIONAL THERAPY - MEDICARE DAILY TREATMENT NOTE (updated 3/23)      Date: 2025          Patient Name:  Harriet Mcmillan :  1945   Medical   Diagnosis:  Lymphedema, not elsewhere classified [I89.0] Treatment Diagnosis:  I89.0     Lymphedema, not elsewhere classified    Referral Source:  Martha Whiting MD Insurance:   Payor: [facility] MEDICARE / Plan: AET MEDICARE-ADVANTAGE PPO / Product Type: Medicare /                     Patient  verified yes     Visit #   Current  / Total 13 24   Time   In / Out 1004 1118   Total Treatment Time 74   Total Timed Codes 74   1:1 Treatment Time 74      [facility] Totals Reminder:  bill using total billable   min of TIMED therapeutic procedures and modalities.   8-22 min = 1 unit; 23-37 min = 2 units; 38-52 min = 3 units;  53-67 min = 4 units; 68-82 min = 5 units         SUBJECTIVE  If an interpreting service was utilized for treatment of this patient, the contents of this document represent the material reviewed with the patient via the .     Pain Level (0-10 scale): 0    Any medication changes, allergies to medications, adverse drug reactions, diagnosis change, or new procedure performed?: [x] No    [] Yes (see summary sheet for update)  Medications: Verified on Patient Summary List    Subjective functional status/changes:     Pt arrives with bandages intact. Notes R side did not slide down this time. Agreeable to treatment and measuring for garments today  OBJECTIVE      Therapeutic Procedures:  Tx Min Billable or 1:1 Min (if diff from Tx Min) Procedure, Rationale, Specifics   74  10189 Manual Therapy (timed):  increase tissue extensibility and decrease edema to improve patient's ability to progress to PLOF and address remaining functional goals.  The manual therapy interventions were performed at a separate and distinct time from the therapeutic activities interventions . (see flow sheet as applicable)    Details if applicable:

## 2025-06-20 ENCOUNTER — APPOINTMENT (OUTPATIENT)
Facility: HOSPITAL | Age: 80
End: 2025-06-20
Payer: MEDICARE

## 2025-06-24 ENCOUNTER — TELEPHONE (OUTPATIENT)
Facility: CLINIC | Age: 80
End: 2025-06-24

## 2025-06-24 NOTE — TELEPHONE ENCOUNTER
Returned call to patient. Advised to call [facility] and make sure they are filling the 1mg of Ozempic. Patient states more papers will be coming for Dr Collins to sign for her lymphedema supplies. Advised we would sign them when we get them.    Lauren Youssef, PharmD, BCPS, [facility]    For Pharmacy Admin Tracking Only    Program: Medical Group  CPA in place:  Yes  Recommendation Provided To: Patient/Caregiver: 1 via Telephone  Intervention Detail: Patient Access Assistance/Sample Provided  Intervention Accepted By: Patient/Caregiver: 1  Gap Closed?: No   Time Spent (min): 5

## 2025-06-24 NOTE — TELEPHONE ENCOUNTER
Patient called in and requesting a return call from the pharmacist.  Patient contact number: [phone]    Per patient her Ozempic dose was changed from 0.5 mg to 1.0 mg and there is a mix up at the [facility] pharmacy and she does not have her dose for tomorrow: 6/25/25.    Julita Guillaume CphT   [facility] Clinical   [facility] Clinical Pharmacy  Department, toll free: [phone], option 2

## 2025-06-25 ENCOUNTER — HOSPITAL ENCOUNTER (OUTPATIENT)
Facility: HOSPITAL | Age: 80
Setting detail: RECURRING SERIES
Discharge: HOME | End: 2025-06-28
Payer: MEDICARE

## 2025-06-25 ENCOUNTER — TELEPHONE (OUTPATIENT)
Age: 80
End: 2025-06-25

## 2025-06-25 DIAGNOSIS — E11.9 TYPE 2 DIABETES MELLITUS WITHOUT COMPLICATION, WITHOUT LONG-TERM CURRENT USE OF INSULIN (HCC): ICD-10-CM

## 2025-06-25 PROCEDURE — 97140 MANUAL THERAPY 1/> REGIONS: CPT

## 2025-06-25 RX ORDER — SEMAGLUTIDE 1.34 MG/ML
1 INJECTION, SOLUTION SUBCUTANEOUS
Qty: 9 ML | Refills: 0 | Status: ACTIVE | OUTPATIENT
Start: 2025-06-25

## 2025-06-25 NOTE — PROGRESS NOTES
PHYSICAL THERAPY/OCCUPATIONAL THERAPY - MEDICARE DAILY TREATMENT NOTE (updated 3/23)      Date: 2025          Patient Name:  Harriet Mcmillan :  1945   Medical   Diagnosis:  Lymphedema, not elsewhere classified [I89.0] Treatment Diagnosis:  I89.0     Lymphedema, not elsewhere classified    Referral Source:  Martha Whiting MD Insurance:   Payor: [facility] MEDICARE / Plan: AET MEDICARE-ADVANTAGE PPO / Product Type: Medicare /                     Patient  verified yes     Visit #   Current  / Total 14 24   Time   In / Out 0957 1117   Total Treatment Time 80   Total Timed Codes 80   1:1 Treatment Time 80      [facility] Totals Reminder:  bill using total billable   min of TIMED therapeutic procedures and modalities.   8-22 min = 1 unit; 23-37 min = 2 units; 38-52 min = 3 units;  53-67 min = 4 units; 68-82 min = 5 units         SUBJECTIVE  If an interpreting service was utilized for treatment of this patient, the contents of this document represent the material reviewed with the patient via the .     Pain Level (0-10 scale): 0    Any medication changes, allergies to medications, adverse drug reactions, diagnosis change, or new procedure performed?: [x] No    [] Yes (see summary sheet for update)  Medications: Verified on Patient Summary List    Subjective functional status/changes:     Pt arrives with bandages intact. Notes that two layer coban wrap did well with travel and did not slide down. Pt notes she was able to do a lot of walking on her trip.  Patient has received Tactile Flexitouch but leg piece was too long,  so she is using shorts 15 minutes per day until legs pieces arrive.     OBJECTIVE      Therapeutic Procedures:  Tx Min Billable or 1:1 Min (if diff from Tx Min) Procedure, Rationale, Specifics   80  05362 Manual Therapy (timed):  increase tissue extensibility and decrease edema to improve patient's ability to progress to PLOF and address remaining functional goals.  The manual

## 2025-06-25 NOTE — TELEPHONE ENCOUNTER
This request is for a 90 day fill, requested by pharmacy.    Last visit 3/13/25 with NP Michelle  Follow up not scheduled     No labs done within the past 6 months on file.

## 2025-06-25 NOTE — TELEPHONE ENCOUNTER
Called [facility] to confirm that the Ozempic 1mg is indeed ready for patient to . Returned call to patient and left a message letting her know this. Advised to return call if she had additional questions.    Lauren Youssef, PharmD, BCPS, [facility]    For Pharmacy Admin Tracking Only    Program: Medical Group  CPA in place:  Yes  Recommendation Provided To: Patient/Caregiver: 1 via Telephone  Intervention Detail: Patient Access Assistance/Sample Provided  Intervention Accepted By: Patient/Caregiver: 1  Gap Closed?: No   Time Spent (min): 5

## 2025-06-25 NOTE — TELEPHONE ENCOUNTER
Patient called in and requesting a return call from the pharmacist.  Patient contact number: [phone]    Patient called in to report that she does not have any Ozempic due to a mix up at [facility].    She also wanted to to let you know that her [employer] insurance is looking for a signature from her provider for her leg apparatus.    Julita Guillaume [facility] Clinical   [facility] Pharmacy  Department, toll free: [phone], option 2

## 2025-06-27 ENCOUNTER — HOSPITAL ENCOUNTER (OUTPATIENT)
Facility: HOSPITAL | Age: 80
Setting detail: RECURRING SERIES
Discharge: HOME | End: 2025-06-30
Payer: MEDICARE

## 2025-06-27 PROCEDURE — 97140 MANUAL THERAPY 1/> REGIONS: CPT

## 2025-06-27 NOTE — PROGRESS NOTES
[facility]   a part of [facility]  [address], [address]   [place] [address]   Phone: [phone]  Fax: [phone]      OCCUPATIONAL THERAPY PROGRESS NOTE  Patient Name:  Harriet Mcmillan :  1945   Treatment/Medical Diagnosis:  Lymphedema, not elsewhere classified [I89.0]   Referral Source:  Martha Whiting MD     Date of Initial Visit:  2025 Attended Visits: 3 Missed Visits: 0     SUMMARY OF TREATMENT/ASSESSMENT:  Patient has been seen by OT 3 visits for continuity of care while her primary PT is out of office.  She is doing well with Phase I CDT and now has her pump and is awaiting custom compression.  Her skin quality and swelling continue to improve.     CURRENT STATUS/GOALS  Short Term Goals: To be accomplished in 12 treatments.     Patient will independently identify at least 4 practices of proper skin care/infection prevention over 3 consecutive sessions to reduce risk of infections and wounds in 12 visits. Goal met   Patient will demonstrate independence in lymphedema home program of therapeutic exercises and self MLD over 3 consecutive sessions to improve circulation and decongest limb and for increased independence/tolerance for functional activities within 12 visits. Goal met   Patient will tolerate wearing MLB/compression garment for 3 days between sessions over 3 consecutive visits to improve circulation and decongest limb and for increased independence/tolerance for functional activities within 12 visits. Goal met   Long Term Goals: To be accomplished in 24 treatments.  Patient will demonstrate improved edema management evidenced by performing donning/doffing of garments from dependent to modified independent 3/3 times within session to aid in reducing risk for infection in 24 visits. Ongoing   Patient will demonstrate decrease in self-perceived functional impairment as evidenced by improved score on LLIS outcome measure from 76%

## 2025-06-27 NOTE — PROGRESS NOTES
PHYSICAL THERAPY/OCCUPATIONAL THERAPY - MEDICARE DAILY TREATMENT NOTE (updated 3/23)      Date: 2025          Patient Name:  Harriet Mcmillan :  1945   Medical   Diagnosis:  Lymphedema, not elsewhere classified [I89.0] Treatment Diagnosis:  I89.0     Lymphedema, not elsewhere classified    Referral Source:  Martha Whiting MD Insurance:   Payor: [facility] MEDICARE / Plan: AET MEDICARE-ADVANTAGE PPO / Product Type: Medicare /                     Patient  verified yes     Visit #   Current  / Total 3 24   Time   In / Out 1100 1200   Total Treatment Time 60   Total Timed Codes 60   1:1 Treatment Time 60      [facility] Totals Reminder:  bill using total billable   min of TIMED therapeutic procedures and modalities.   8-22 min = 1 unit; 23-37 min = 2 units; 38-52 min = 3 units;  53-67 min = 4 units; 68-82 min = 5 units         SUBJECTIVE  If an interpreting service was utilized for treatment of this patient, the contents of this document represent the material reviewed with the patient via the .     Pain Level (0-10 scale): No pain reported     Any medication changes, allergies to medications, adverse drug reactions, diagnosis change, or new procedure performed?: [x] No    [] Yes (see summary sheet for update)  Medications: Verified on Patient Summary List    Subjective functional status/changes:     Pt arrived w/bandages in tact, no acute changes noted     OBJECTIVE      Therapeutic Procedures:  Tx Min Billable or 1:1 Min (if diff from Tx Min) Procedure, Rationale, Specifics   60 60 13926 Manual Therapy (timed):  decrease edema to improve patient's ability to progress to PLOF and address remaining functional goals.  The manual therapy interventions were performed at a separate and distinct time from the therapeutic activities interventions . (see flow sheet as applicable)    Details if applicable:      Manual Lymphatic Drainage (MLD):  Area to decongest: LE Bilateral, foot, ankle, calf, mid

## 2025-07-01 ENCOUNTER — HOSPITAL ENCOUNTER (OUTPATIENT)
Facility: HOSPITAL | Age: 80
Setting detail: RECURRING SERIES
Discharge: HOME | End: 2025-07-04
Payer: MEDICARE

## 2025-07-01 PROCEDURE — 97140 MANUAL THERAPY 1/> REGIONS: CPT

## 2025-07-01 NOTE — PROGRESS NOTES
PHYSICAL THERAPY/OCCUPATIONAL THERAPY - MEDICARE DAILY TREATMENT NOTE (updated 3/23)      Date: 2025          Patient Name:  Harriet Mcmillan :  1945   Medical   Diagnosis:  Lymphedema, not elsewhere classified [I89.0] Treatment Diagnosis:  I89.0     Lymphedema, not elsewhere classified    Referral Source:  Martha Whiting MD Insurance:   Payor: [facility] MEDICARE / Plan: AET MEDICARE-ADVANTAGE PPO / Product Type: Medicare /                     Patient  verified yes     Visit #   Current  / Total 15 24   Time   In / Out 1006 1102   Total Treatment Time 56   Total Timed Codes 56   1:1 Treatment Time 56      [facility] Totals Reminder:  bill using total billable   min of TIMED therapeutic procedures and modalities.   8-22 min = 1 unit; 23-37 min = 2 units; 38-52 min = 3 units;  53-67 min = 4 units; 68-82 min = 5 units         SUBJECTIVE  If an interpreting service was utilized for treatment of this patient, the contents of this document represent the material reviewed with the patient via the .     Pain Level (0-10 scale): 0    Any medication changes, allergies to medications, adverse drug reactions, diagnosis change, or new procedure performed?: [x] No    [] Yes (see summary sheet for update)  Medications: Verified on Patient Summary List    Subjective functional status/changes:     Pt arrives with bandages intact. Noted R foot was a little harder to get into shoe, but bandages did not slide and did well overall. Pt continues to use trunk portion of pump while waiting for leg pieces to arrive.     OBJECTIVE      Therapeutic Procedures:  Tx Min Billable or 1:1 Min (if diff from Tx Min) Procedure, Rationale, Specifics   56  19771 Manual Therapy (timed):  increase tissue extensibility and decrease edema to improve patient's ability to progress to PLOF and address remaining functional goals.  The manual therapy interventions were performed at a separate and distinct time from the therapeutic

## 2025-07-03 ENCOUNTER — HOSPITAL ENCOUNTER (OUTPATIENT)
Facility: HOSPITAL | Age: 80
Setting detail: RECURRING SERIES
Discharge: HOME | End: 2025-07-06
Payer: MEDICARE

## 2025-07-03 PROCEDURE — 97140 MANUAL THERAPY 1/> REGIONS: CPT

## 2025-07-08 ENCOUNTER — HOSPITAL ENCOUNTER (OUTPATIENT)
Facility: HOSPITAL | Age: 80
Setting detail: RECURRING SERIES
Discharge: HOME | End: 2025-07-11
Payer: MEDICARE

## 2025-07-08 PROCEDURE — 97140 MANUAL THERAPY 1/> REGIONS: CPT

## 2025-07-08 NOTE — PROGRESS NOTES
PHYSICAL THERAPY/OCCUPATIONAL THERAPY - MEDICARE DAILY TREATMENT NOTE (updated 3/23)      Date: 2025          Patient Name:  Harriet Mcmillan :  1945   Medical   Diagnosis:  Lymphedema, not elsewhere classified [I89.0] Treatment Diagnosis:  I89.0     Lymphedema, not elsewhere classified    Referral Source:  Martha Whiting MD Insurance:   Payor: [facility] MEDICARE / Plan: AET MEDICARE-ADVANTAGE PPO / Product Type: Medicare /                     Patient  verified yes     Visit #   Current  / Total 17 24   Time   In / Out 1008 1103   Total Treatment Time 55   Total Timed Codes 55   1:1 Treatment Time       [facility] Totals Reminder:  bill using total billable   min of TIMED therapeutic procedures and modalities.   8-22 min = 1 unit; 23-37 min = 2 units; 38-52 min = 3 units;  53-67 min = 4 units; 68-82 min = 5 units         SUBJECTIVE  If an interpreting service was utilized for treatment of this patient, the contents of this document represent the material reviewed with the patient via the .     Pain Level (0-10 scale): 0    Any medication changes, allergies to medications, adverse drug reactions, diagnosis change, or new procedure performed?: [x] No    [] Yes (see summary sheet for update)  Medications: Verified on Patient Summary List    Subjective functional status/changes:     Pt arrives with bandages intact. Pt continues to use trunk portion of pump, shorter leg pieces arrived yesterday, Tactile rep to come back out and setup.   OBJECTIVE      Therapeutic Procedures:  Tx Min Billable or 1:1 Min (if diff from Tx Min) Procedure, Rationale, Specifics   55  43642 Manual Therapy (timed):  increase tissue extensibility and decrease edema to improve patient's ability to progress to PLOF and address remaining functional goals.  The manual therapy interventions were performed at a separate and distinct time from the therapeutic activities interventions . (see flow sheet as applicable)    Details if

## 2025-07-11 ENCOUNTER — HOSPITAL ENCOUNTER (OUTPATIENT)
Facility: HOSPITAL | Age: 80
Setting detail: RECURRING SERIES
Discharge: HOME | End: 2025-07-14
Payer: MEDICARE

## 2025-07-11 PROCEDURE — 97140 MANUAL THERAPY 1/> REGIONS: CPT

## 2025-07-11 NOTE — PROGRESS NOTES
PHYSICAL THERAPY/OCCUPATIONAL THERAPY - MEDICARE DAILY TREATMENT NOTE (updated 3/23)      Date: 2025          Patient Name:  Harriet Mcmillan :  1945   Medical   Diagnosis:  Lymphedema, not elsewhere classified [I89.0] Treatment Diagnosis:  I89.0     Lymphedema, not elsewhere classified    Referral Source:  Martha Whiting MD Insurance:   Payor: [facility] MEDICARE / Plan: AET MEDICARE-ADVANTAGE PPO / Product Type: Medicare /                     Patient  verified yes     Visit #   Current  / Total 18 24   Time   In / Out 1020 1122   Total Treatment Time 62   Total Timed Codes 62   1:1 Treatment Time       [facility] Totals Reminder:  bill using total billable   min of TIMED therapeutic procedures and modalities.   8-22 min = 1 unit; 23-37 min = 2 units; 38-52 min = 3 units;  53-67 min = 4 units; 68-82 min = 5 units         SUBJECTIVE  If an interpreting service was utilized for treatment of this patient, the contents of this document represent the material reviewed with the patient via the .     Pain Level (0-10 scale): 0    Any medication changes, allergies to medications, adverse drug reactions, diagnosis change, or new procedure performed?: [x] No    [] Yes (see summary sheet for update)  Medications: Verified on Patient Summary List    Subjective functional status/changes:     Pt arrives with bandages intact. Pt continues to use trunk portion of pump, shorter leg pieces arrived and she was able to independently used, but struggled with getting zipper over R calf.   OBJECTIVE      Therapeutic Procedures:  Tx Min Billable or 1:1 Min (if diff from Tx Min) Procedure, Rationale, Specifics   62  64724 Manual Therapy (timed):  increase tissue extensibility and decrease edema to improve patient's ability to progress to PLOF and address remaining functional goals.  The manual therapy interventions were performed at a separate and distinct time from the therapeutic activities interventions . (see

## 2025-07-15 ENCOUNTER — HOSPITAL ENCOUNTER (OUTPATIENT)
Facility: HOSPITAL | Age: 80
Setting detail: RECURRING SERIES
Discharge: HOME | End: 2025-07-18
Payer: MEDICARE

## 2025-07-15 PROCEDURE — 97140 MANUAL THERAPY 1/> REGIONS: CPT

## 2025-07-15 NOTE — PROGRESS NOTES
39.7 28.2   Calf     (25 cm from floor) 60 46.4 58 47   Calf     (33 cm from floor) 61.5 53 59 50.6   Knee   (patella) 68.5 64 65.5 63.4   Thigh  (53 cm from floor) 76 70 69.5 69       Right Leg: Eval 4/23 5/22/25:   7/3/25    Left leg: /    Pain Level at end of session (0-10 scale): 0      Assessment   Mrs. Mcmillan is tolerating MLD and MLB, compliant with keeping bandaging on, skin remains intact, redness improving, areas of hyperkeratosis and dry/flaky skin continue to improve.  Measured for custom garments with Radha from [facility] 6/13, Patient has received flexitouch pump, using trunk piece, new leg pieces arrived and working on finding technique to independently don and doff. Fit for garments upon arrival- reached out to [facility] for update.     Patient will continue to benefit from skilled PT / OT services to address functional mobility deficits, address swelling, analyze compression product fit and use, instruct in home lymphedema management program, measure for compression products, and modify and progress therapeutic interventions to address functional deficits and attain remaining goals.    Progress toward goals / Updated goals:  []  See Progress Note/Recertification    Short Term Goals: To be accomplished in 12 treatments.  Patient will demonstrate decreased volumetric measurement of right lower extremity to <49667 ml, in order to reduce risk for infection, decrease feeling of limb heaviness, and increase independence/tolerance for car transfers, bed mobility, walking activity completion 6 weeks.  MET   Patient will demonstrate decreased volumetric measurements of left lower extremity to <51017 ml, in order to reduce risk for infection, decrease feeling of limb heaviness, and increase independence/tolerance for car transfers, bed mobility, walking activity completion 6 weeks.  Met   Patient to perform  lymphedema remedial exercises home program in session with modified independence utilizing

## 2025-07-16 ENCOUNTER — PHARMACY VISIT (OUTPATIENT)
Facility: CLINIC | Age: 80
End: 2025-07-16

## 2025-07-16 DIAGNOSIS — E11.9 TYPE 2 DIABETES MELLITUS WITHOUT COMPLICATION, WITHOUT LONG-TERM CURRENT USE OF INSULIN (HCC): Primary | ICD-10-CM

## 2025-07-16 NOTE — PROGRESS NOTES
Pharmacy Progress Note - Diabetes Management    Assessment / Plan:   Diabetes Management:  - Per ADA guidelines, Pt's A1c is at goal of < 7%.   - Current SMBG(s)/CGM trend not evaluated as patient is not checking  - continue current regimen - refill sent in for Ozempic 1mg to pharmacy    Follow up: 8/20/2025       S/O: Harriet Mcmillan is a 79 y.o. female referred by Martha Roberson MD for diabetes management.     Wt Readings from Last 10 Encounters:   05/27/25 125.6 kg (277 lb)   03/13/25 (!) 137 kg (302 lb)   03/06/25 (!) 137 kg (302 lb)   11/25/24 131.8 kg (290 lb 9.6 oz)   05/28/24 129 kg (284 lb 8 oz)   03/01/24 (!) 140.3 kg (309 lb 3.2 oz)   11/28/23 (!) 140.3 kg (309 lb 6.4 oz)   06/08/22 (!) 138.9 kg (306 lb 3.2 oz)   06/01/22 (!) 137.8 kg (303 lb 12.8 oz)   12/01/21 (!) 137.8 kg (303 lb 12.8 oz)     Current diabetes regimen include(s):    - Ozempic 1mg once weekly    ROS:  Today, Pt endorses:  - Symptoms of Hyperglycemia: none  - Symptoms of Hypoglycemia: none    Blood Glucose Monitoring (BGM) or CGM:  Not checking    Nutrition:  First 2 months noticed a big difference with appetitie  Does miss having some carbs- has been trying hard to minimize      Physical Activity:   Limited by lymphedema    Last eye exam:  needs to schedule    The 10-year ASCVD risk score (Marie BECKMAN, et al., 2019) is: 43%    Values used to calculate the score:      Age: 79 years      Sex: Female      Is Non- : No      Diabetic: Yes      Tobacco smoker: No      Systolic Blood Pressure: 118 mmHg      Is BP treated: Yes      HDL Cholesterol: 67 MG/DL      Total Cholesterol: 186 MG/DL     Vitals:  Wt Readings from Last 3 Encounters:   05/27/25 125.6 kg (277 lb)   03/13/25 (!) 137 kg (302 lb)   03/06/25 (!) 137 kg (302 lb)     BP Readings from Last 3 Encounters:   05/27/25 118/72   05/05/25 (!) 116/57   04/28/25 (!) 142/60     Pulse Readings from Last 3 Encounters:   05/27/25 72   05/05/25 63   04/28/25 73

## 2025-07-18 ENCOUNTER — HOSPITAL ENCOUNTER (OUTPATIENT)
Facility: HOSPITAL | Age: 80
Setting detail: RECURRING SERIES
Discharge: HOME | End: 2025-07-21
Payer: MEDICARE

## 2025-07-18 PROCEDURE — 97140 MANUAL THERAPY 1/> REGIONS: CPT

## 2025-07-18 NOTE — PROGRESS NOTES
PHYSICAL THERAPY/OCCUPATIONAL THERAPY - MEDICARE DAILY TREATMENT NOTE (updated 3/23)      Date: 2025          Patient Name:  Harriet Mcmillan :  1945   Medical   Diagnosis:  Lymphedema, not elsewhere classified [I89.0] Treatment Diagnosis:  I89.0     Lymphedema, not elsewhere classified    Referral Source:  Martha Whiting MD Insurance:   Payor: [facility] MEDICARE / Plan: AET MEDICARE-ADVANTAGE PPO / Product Type: Medicare /                     Patient  verified yes     Visit #   Current  / Total 20 24   Time   In / Out 0957 1100   Total Treatment Time 63   Total Timed Codes 63   1:1 Treatment Time       [facility] Totals Reminder:  bill using total billable   min of TIMED therapeutic procedures and modalities.   8-22 min = 1 unit; 23-37 min = 2 units; 38-52 min = 3 units;  53-67 min = 4 units; 68-82 min = 5 units         SUBJECTIVE  If an interpreting service was utilized for treatment of this patient, the contents of this document represent the material reviewed with the patient via the .     Pain Level (0-10 scale): 0    Any medication changes, allergies to medications, adverse drug reactions, diagnosis change, or new procedure performed?: [x] No    [] Yes (see summary sheet for update)  Medications: Verified on Patient Summary List    Subjective functional status/changes:     Pt arrives with bandages intact. Pt continues to use trunk portion of pump, shorter leg pieces arrived and she was able to independently used, but struggled with getting zipper over R calf, stating she is making progress with donning. Patient is agreeable to proceed with treatment.  OBJECTIVE      Therapeutic Procedures:  Tx Min Billable or 1:1 Min (if diff from Tx Min) Procedure, Rationale, Specifics   63  84115 Manual Therapy (timed):  increase tissue extensibility and decrease edema to improve patient's ability to progress to PLOF and address remaining functional goals.  The manual therapy interventions were

## 2025-07-22 ENCOUNTER — HOSPITAL ENCOUNTER (OUTPATIENT)
Facility: HOSPITAL | Age: 80
Setting detail: RECURRING SERIES
Discharge: HOME | End: 2025-07-25
Payer: MEDICARE

## 2025-07-22 PROCEDURE — 97140 MANUAL THERAPY 1/> REGIONS: CPT

## 2025-07-22 NOTE — PROGRESS NOTES
(Note Decreased circumferential measurements at each point)     Rationale: To improve lymphatic fluid movement and decrease swelling to improve the patient’s ability to perform ADL and IADL skills and prevent worsening of swelling over time.    Skin assessment post-treatment (if applicable):    [x]  intact    []  redness- no adverse reaction                 []redness - adverse reaction:          Patient Education: [x] Review HEP    [x]  Patient Education billed concurrently with other procedures   [] MLD Patient Education Reviewed with patient, as well as demonstration and instruction during MLD portion of session and Continued education in self MLD technique with bathing and skin care  [] Progressed/Changed HEP based on:   [x] Positioning- discussed elevating legs and walking vs static standing/sitting   [] Kinesiotape   [x] Skin care   [x] wound care   [x] other: Discussed options for modifying donning/doffing pump to be more efficient. Also discussed potentially having home based outpatient OT come and help with techniques.   [] Patient/caregiver in multi-layer bandaging donning principles. and Precautions.  Patient / caregiver re-demonstrated bandaging. [] Yes  [x] No  Compression bandaging/garment precautions reviewed: [x] Yes  [] No      Other Objective/Functional Measures  Pt uses Rollator to access clinic, assist managing legs on/off treatment table.     Height: 5'0\"  Weight: 290#  BMI: 56.6  (36 or greater: adversely affecting lymphedema)    Volumes:  Date Right (mL) Left (mL) Volume difference %   Difference   7/22/25 9802 36845 288 2.94   7/3/25 51968 47882 388 3.81   6/18/25 44609 90572 -663 -6.08   6/6/25 18129 9901 835 18.53   5/22/25 14204 25274 526 4.28   5/6/25 48662 32795 801 6.72   4/23/25 54598 98567 1034 7.98       Circumferences:         Lower Extremity Girth (cm):  Right    4/23 Right   7/3 Right  7/22 Left    4/23 Left  7/3 Left  7/22   MTP: 22.5 21.5 20.7 22.5 20.5 21.3

## 2025-07-22 NOTE — THERAPY RECERTIFICATION
patient be discharged from therapy.     Physician's Signature:_________________________   DATE:_________   TIME:________                           Martha Whiting MD    ** Signature, Date and Time must be completed for valid certification **  Please sign and fax to [phone].  Thank you

## 2025-07-25 ENCOUNTER — HOSPITAL ENCOUNTER (OUTPATIENT)
Facility: HOSPITAL | Age: 80
Setting detail: RECURRING SERIES
Discharge: HOME | End: 2025-07-28
Payer: MEDICARE

## 2025-07-25 PROCEDURE — 97140 MANUAL THERAPY 1/> REGIONS: CPT

## 2025-07-25 PROCEDURE — 97760 ORTHOTIC MGMT&TRAING 1ST ENC: CPT

## 2025-07-25 NOTE — PROGRESS NOTES
PHYSICAL THERAPY/OCCUPATIONAL THERAPY - MEDICARE DAILY TREATMENT NOTE (updated 3/23)      Date: 2025          Patient Name:  Harriet Mcmillan :  1945   Medical   Diagnosis:  Lymphedema, not elsewhere classified [I89.0] Treatment Diagnosis:  I89.0     Lymphedema, not elsewhere classified    Referral Source:  Martha Whiting MD Insurance:   Payor: [facility] MEDICARE / Plan: AET MEDICARE-ADVANTAGE PPO / Product Type: Medicare /                     Patient  verified yes     Visit #   Current  / Total 22 48 per recert   Time   In / Out 1010 1140   Total Treatment Time 90   Total Timed Codes 90   1:1 Treatment Time 90       [facility] Totals Reminder:  bill using total billable   min of TIMED therapeutic procedures and modalities.   8-22 min = 1 unit; 23-37 min = 2 units; 38-52 min = 3 units;  53-67 min = 4 units; 68-82 min = 5 units         SUBJECTIVE  If an interpreting service was utilized for treatment of this patient, the contents of this document represent the material reviewed with the patient via the .     Pain Level (0-10 scale): 0    Any medication changes, allergies to medications, adverse drug reactions, diagnosis change, or new procedure performed?: [x] No    [] Yes (see summary sheet for update)  Medications: Verified on Patient Summary List    Subjective functional status/changes:     Pt arrives with bandages intact. Is now using all components of pump, but continues to struggle with donning leg pieces. Notes that abdomen feels much softer.  Velcro garments arrived yesterday, pt brought to clinic, worried about donning garments    OBJECTIVE      Therapeutic Procedures:  Tx Min Billable or 1:1 Min (if diff from Tx Min) Procedure, Rationale, Specifics   10  27181 Manual Therapy (timed):  increase tissue extensibility and decrease edema to improve patient's ability to progress to PLOF and address remaining functional goals.  The manual therapy interventions were performed at a separate

## 2025-07-29 ENCOUNTER — HOSPITAL ENCOUNTER (OUTPATIENT)
Facility: HOSPITAL | Age: 80
Setting detail: RECURRING SERIES
Discharge: HOME | End: 2025-08-01
Payer: MEDICARE

## 2025-07-29 PROCEDURE — 97140 MANUAL THERAPY 1/> REGIONS: CPT

## 2025-07-29 PROCEDURE — 97763 ORTHC/PROSTC MGMT SBSQ ENC: CPT

## 2025-07-29 NOTE — PROGRESS NOTES
PHYSICAL THERAPY/OCCUPATIONAL THERAPY - MEDICARE DAILY TREATMENT NOTE (updated 3/23)      Date: 2025          Patient Name:  Harriet Mcmillan :  1945   Medical   Diagnosis:  Lymphedema, not elsewhere classified [I89.0] Treatment Diagnosis:  I89.0     Lymphedema, not elsewhere classified    Referral Source:  Martha Whiting MD Insurance:   Payor: [facility] MEDICARE / Plan: AET MEDICARE-ADVANTAGE PPO / Product Type: Medicare /                     Patient  verified yes     Visit #   Current  / Total 23 48 per recert   Time   In / Out 1008 1105   Total Treatment Time 57   Total Timed Codes 57   1:1 Treatment Time 57      [facility] Totals Reminder:  bill using total billable   min of TIMED therapeutic procedures and modalities.   8-22 min = 1 unit; 23-37 min = 2 units; 38-52 min = 3 units;  53-67 min = 4 units; 68-82 min = 5 units         SUBJECTIVE  If an interpreting service was utilized for treatment of this patient, the contents of this document represent the material reviewed with the patient via the .     Pain Level (0-10 scale): 0    Any medication changes, allergies to medications, adverse drug reactions, diagnosis change, or new procedure performed?: [x] No    [] Yes (see summary sheet for update)  Medications: Verified on Patient Summary List    Subjective functional status/changes:     Pt arrives with velcro intact, noting that she had son help her with donning socks, but was able to independently manage velcro. Expresses concern about stocking management.     OBJECTIVE      Therapeutic Procedures:  Tx Min Billable or 1:1 Min (if diff from Tx Min) Procedure, Rationale, Specifics   10  [id number] Manual Therapy (timed):  increase tissue extensibility and decrease edema to improve patient's ability to progress to PLOF and address remaining functional goals.  The manual therapy interventions were performed at a separate and distinct time from the therapeutic activities interventions . (see

## 2025-08-01 ENCOUNTER — HOSPITAL ENCOUNTER (OUTPATIENT)
Facility: HOSPITAL | Age: 80
Setting detail: RECURRING SERIES
Discharge: HOME | End: 2025-08-04
Payer: MEDICARE

## 2025-08-01 PROCEDURE — 97763 ORTHC/PROSTC MGMT SBSQ ENC: CPT

## 2025-08-08 ENCOUNTER — HOSPITAL ENCOUNTER (OUTPATIENT)
Facility: HOSPITAL | Age: 80
Setting detail: RECURRING SERIES
Discharge: HOME | End: 2025-08-11
Payer: MEDICARE

## 2025-08-08 PROCEDURE — 97763 ORTHC/PROSTC MGMT SBSQ ENC: CPT

## 2025-08-08 PROCEDURE — 97140 MANUAL THERAPY 1/> REGIONS: CPT

## 2025-08-13 ENCOUNTER — HOSPITAL ENCOUNTER (OUTPATIENT)
Facility: HOSPITAL | Age: 80
Setting detail: RECURRING SERIES
Discharge: HOME | End: 2025-08-16
Payer: MEDICARE

## 2025-08-13 PROCEDURE — 97763 ORTHC/PROSTC MGMT SBSQ ENC: CPT

## 2025-08-20 ENCOUNTER — PHARMACY VISIT (OUTPATIENT)
Facility: CLINIC | Age: 80
End: 2025-08-20

## 2025-08-20 DIAGNOSIS — E11.9 TYPE 2 DIABETES MELLITUS WITHOUT COMPLICATION, WITHOUT LONG-TERM CURRENT USE OF INSULIN (HCC): Primary | ICD-10-CM

## 2025-08-22 ENCOUNTER — HOSPITAL ENCOUNTER (OUTPATIENT)
Facility: HOSPITAL | Age: 80
Setting detail: RECURRING SERIES
Discharge: HOME | End: 2025-08-25
Payer: MEDICARE

## 2025-08-22 PROCEDURE — 97763 ORTHC/PROSTC MGMT SBSQ ENC: CPT

## 2025-08-22 PROCEDURE — 97140 MANUAL THERAPY 1/> REGIONS: CPT

## 2025-08-26 ENCOUNTER — HOSPITAL ENCOUNTER (OUTPATIENT)
Facility: HOSPITAL | Age: 80
Setting detail: RECURRING SERIES
Discharge: HOME | End: 2025-08-29
Payer: MEDICARE

## 2025-08-26 PROCEDURE — 97763 ORTHC/PROSTC MGMT SBSQ ENC: CPT

## 2025-08-26 PROCEDURE — 97140 MANUAL THERAPY 1/> REGIONS: CPT

## 2025-09-03 ENCOUNTER — HOSPITAL ENCOUNTER (OUTPATIENT)
Facility: HOSPITAL | Age: 80
Setting detail: RECURRING SERIES
Discharge: HOME | End: 2025-09-06
Payer: MEDICARE

## 2025-09-03 PROCEDURE — 97140 MANUAL THERAPY 1/> REGIONS: CPT

## 2025-09-03 PROCEDURE — 97763 ORTHC/PROSTC MGMT SBSQ ENC: CPT

## 2025-09-03 PROCEDURE — 97535 SELF CARE MNGMENT TRAINING: CPT
